# Patient Record
Sex: FEMALE | Race: BLACK OR AFRICAN AMERICAN | URBAN - METROPOLITAN AREA
[De-identification: names, ages, dates, MRNs, and addresses within clinical notes are randomized per-mention and may not be internally consistent; named-entity substitution may affect disease eponyms.]

---

## 2019-02-28 ENCOUNTER — OUTPATIENT (OUTPATIENT)
Dept: OUTPATIENT SERVICES | Facility: HOSPITAL | Age: 79
LOS: 1 days | Discharge: HOME | End: 2019-02-28

## 2019-02-28 DIAGNOSIS — R00.2 PALPITATIONS: ICD-10-CM

## 2019-02-28 DIAGNOSIS — E78.5 HYPERLIPIDEMIA, UNSPECIFIED: ICD-10-CM

## 2019-10-02 ENCOUNTER — OUTPATIENT (OUTPATIENT)
Dept: OUTPATIENT SERVICES | Facility: HOSPITAL | Age: 79
LOS: 1 days | Discharge: HOME | End: 2019-10-02

## 2019-10-02 DIAGNOSIS — E66.3 OVERWEIGHT: ICD-10-CM

## 2019-10-02 DIAGNOSIS — R94.31 ABNORMAL ELECTROCARDIOGRAM [ECG] [EKG]: ICD-10-CM

## 2019-10-02 DIAGNOSIS — E78.5 HYPERLIPIDEMIA, UNSPECIFIED: ICD-10-CM

## 2019-10-02 DIAGNOSIS — I05.9 RHEUMATIC MITRAL VALVE DISEASE, UNSPECIFIED: ICD-10-CM

## 2019-10-02 DIAGNOSIS — R00.2 PALPITATIONS: ICD-10-CM

## 2019-10-07 ENCOUNTER — OUTPATIENT (OUTPATIENT)
Dept: OUTPATIENT SERVICES | Facility: HOSPITAL | Age: 79
LOS: 1 days | Discharge: HOME | End: 2019-10-07

## 2021-05-12 ENCOUNTER — INPATIENT (INPATIENT)
Facility: HOSPITAL | Age: 81
LOS: 5 days | Discharge: NURSING, REHAB OR LONG-TERM CARE | End: 2021-05-18
Attending: INTERNAL MEDICINE | Admitting: INTERNAL MEDICINE
Payer: MEDICARE

## 2021-05-12 VITALS
SYSTOLIC BLOOD PRESSURE: 166 MMHG | RESPIRATION RATE: 18 BRPM | TEMPERATURE: 98 F | DIASTOLIC BLOOD PRESSURE: 83 MMHG | OXYGEN SATURATION: 98 % | HEART RATE: 69 BPM

## 2021-05-12 DIAGNOSIS — M25.552 PAIN IN LEFT HIP: ICD-10-CM

## 2021-05-12 DIAGNOSIS — H54.8 LEGAL BLINDNESS, AS DEFINED IN USA: ICD-10-CM

## 2021-05-12 DIAGNOSIS — S72.142A DISPLACED INTERTROCHANTERIC FRACTURE OF LEFT FEMUR, INITIAL ENCOUNTER FOR CLOSED FRACTURE: ICD-10-CM

## 2021-05-12 DIAGNOSIS — Z87.891 PERSONAL HISTORY OF NICOTINE DEPENDENCE: ICD-10-CM

## 2021-05-12 DIAGNOSIS — H40.9 UNSPECIFIED GLAUCOMA: ICD-10-CM

## 2021-05-12 DIAGNOSIS — R76.0 RAISED ANTIBODY TITER: ICD-10-CM

## 2021-05-12 DIAGNOSIS — Z91.040 LATEX ALLERGY STATUS: ICD-10-CM

## 2021-05-12 DIAGNOSIS — I35.0 NONRHEUMATIC AORTIC (VALVE) STENOSIS: ICD-10-CM
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
## 2021-05-12 DIAGNOSIS — R03.0 ELEVATED BLOOD-PRESSURE READING, WITHOUT DIAGNOSIS OF HYPERTENSION: ICD-10-CM
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
## 2021-05-12 LAB
ALBUMIN SERPL ELPH-MCNC: 4 G/DL — SIGNIFICANT CHANGE UP (ref 3.5–5.2)
ALP SERPL-CCNC: 89 U/L — SIGNIFICANT CHANGE UP (ref 30–115)
ALT FLD-CCNC: 18 U/L — SIGNIFICANT CHANGE UP (ref 0–41)
ANION GAP SERPL CALC-SCNC: 11 MMOL/L — SIGNIFICANT CHANGE UP (ref 7–14)
APTT BLD: 34.7 SEC — SIGNIFICANT CHANGE UP (ref 27–39.2)
AST SERPL-CCNC: 72 U/L — HIGH (ref 0–41)
BASE EXCESS BLDV CALC-SCNC: 3.2 MMOL/L — HIGH (ref -2–2)
BASOPHILS # BLD AUTO: 0.03 K/UL — SIGNIFICANT CHANGE UP (ref 0–0.2)
BASOPHILS NFR BLD AUTO: 0.4 % — SIGNIFICANT CHANGE UP (ref 0–1)
BILIRUB SERPL-MCNC: 0.7 MG/DL — SIGNIFICANT CHANGE UP (ref 0.2–1.2)
BLD GP AB SCN SERPL QL: SIGNIFICANT CHANGE UP
BUN SERPL-MCNC: 17 MG/DL — SIGNIFICANT CHANGE UP (ref 10–20)
CA-I SERPL-SCNC: 1.21 MMOL/L — SIGNIFICANT CHANGE UP (ref 1.12–1.3)
CALCIUM SERPL-MCNC: 9.1 MG/DL — SIGNIFICANT CHANGE UP (ref 8.5–10.1)
CHLORIDE SERPL-SCNC: 106 MMOL/L — SIGNIFICANT CHANGE UP (ref 98–110)
CO2 SERPL-SCNC: 22 MMOL/L — SIGNIFICANT CHANGE UP (ref 17–32)
CREAT SERPL-MCNC: 0.6 MG/DL — LOW (ref 0.7–1.5)
EOSINOPHIL # BLD AUTO: 0.09 K/UL — SIGNIFICANT CHANGE UP (ref 0–0.7)
EOSINOPHIL NFR BLD AUTO: 1.1 % — SIGNIFICANT CHANGE UP (ref 0–8)
GAS PNL BLDV: 141 MMOL/L — SIGNIFICANT CHANGE UP (ref 136–145)
GAS PNL BLDV: SIGNIFICANT CHANGE UP
GLUCOSE SERPL-MCNC: 110 MG/DL — HIGH (ref 70–99)
HCO3 BLDV-SCNC: 30 MMOL/L — HIGH (ref 22–29)
HCT VFR BLD CALC: 41 % — SIGNIFICANT CHANGE UP (ref 37–47)
HCT VFR BLDA CALC: 40.5 % — SIGNIFICANT CHANGE UP (ref 34–44)
HGB BLD CALC-MCNC: 13.2 G/DL — LOW (ref 14–18)
HGB BLD-MCNC: 13.5 G/DL — SIGNIFICANT CHANGE UP (ref 12–16)
IMM GRANULOCYTES NFR BLD AUTO: 0.4 % — HIGH (ref 0.1–0.3)
INR BLD: 1.01 RATIO — SIGNIFICANT CHANGE UP (ref 0.65–1.3)
LACTATE BLDV-MCNC: 1.2 MMOL/L — SIGNIFICANT CHANGE UP (ref 0.5–1.6)
LACTATE SERPL-SCNC: 1.4 MMOL/L — SIGNIFICANT CHANGE UP (ref 0.7–2)
LIDOCAIN IGE QN: 18 U/L — SIGNIFICANT CHANGE UP (ref 7–60)
LYMPHOCYTES # BLD AUTO: 0.77 K/UL — LOW (ref 1.2–3.4)
LYMPHOCYTES # BLD AUTO: 9.1 % — LOW (ref 20.5–51.1)
MCHC RBC-ENTMCNC: 31.2 PG — HIGH (ref 27–31)
MCHC RBC-ENTMCNC: 32.9 G/DL — SIGNIFICANT CHANGE UP (ref 32–37)
MCV RBC AUTO: 94.7 FL — SIGNIFICANT CHANGE UP (ref 81–99)
MONOCYTES # BLD AUTO: 0.67 K/UL — HIGH (ref 0.1–0.6)
MONOCYTES NFR BLD AUTO: 7.9 % — SIGNIFICANT CHANGE UP (ref 1.7–9.3)
NEUTROPHILS # BLD AUTO: 6.9 K/UL — HIGH (ref 1.4–6.5)
NEUTROPHILS NFR BLD AUTO: 81.1 % — HIGH (ref 42.2–75.2)
NRBC # BLD: 0 /100 WBCS — SIGNIFICANT CHANGE UP (ref 0–0)
PCO2 BLDV: 55 MMHG — HIGH (ref 41–51)
PH BLDV: 7.35 — SIGNIFICANT CHANGE UP (ref 7.26–7.43)
PLATELET # BLD AUTO: 147 K/UL — SIGNIFICANT CHANGE UP (ref 130–400)
PO2 BLDV: 26 MMHG — SIGNIFICANT CHANGE UP (ref 20–40)
POTASSIUM BLDV-SCNC: 3.8 MMOL/L — SIGNIFICANT CHANGE UP (ref 3.3–5.6)
POTASSIUM SERPL-MCNC: 6.1 MMOL/L — CRITICAL HIGH (ref 3.5–5)
POTASSIUM SERPL-SCNC: 6.1 MMOL/L — CRITICAL HIGH (ref 3.5–5)
PROT SERPL-MCNC: 6.7 G/DL — SIGNIFICANT CHANGE UP (ref 6–8)
PROTHROM AB SERPL-ACNC: 11.6 SEC — SIGNIFICANT CHANGE UP (ref 9.95–12.87)
RBC # BLD: 4.33 M/UL — SIGNIFICANT CHANGE UP (ref 4.2–5.4)
RBC # FLD: 12.8 % — SIGNIFICANT CHANGE UP (ref 11.5–14.5)
SAO2 % BLDV: 45 % — SIGNIFICANT CHANGE UP
SARS-COV-2 RNA SPEC QL NAA+PROBE: SIGNIFICANT CHANGE UP
SODIUM SERPL-SCNC: 139 MMOL/L — SIGNIFICANT CHANGE UP (ref 135–146)
WBC # BLD: 8.49 K/UL — SIGNIFICANT CHANGE UP (ref 4.8–10.8)
WBC # FLD AUTO: 8.49 K/UL — SIGNIFICANT CHANGE UP (ref 4.8–10.8)

## 2021-05-12 PROCEDURE — 73562 X-RAY EXAM OF KNEE 3: CPT | Mod: 26,LT

## 2021-05-12 PROCEDURE — 93010 ELECTROCARDIOGRAM REPORT: CPT | Mod: 76

## 2021-05-12 PROCEDURE — 99285 EMERGENCY DEPT VISIT HI MDM: CPT | Mod: CS

## 2021-05-12 PROCEDURE — 73502 X-RAY EXAM HIP UNI 2-3 VIEWS: CPT | Mod: 26,LT

## 2021-05-12 PROCEDURE — 99233 SBSQ HOSP IP/OBS HIGH 50: CPT

## 2021-05-12 PROCEDURE — 73551 X-RAY EXAM OF FEMUR 1: CPT | Mod: 26,LT

## 2021-05-12 PROCEDURE — 71045 X-RAY EXAM CHEST 1 VIEW: CPT | Mod: 26

## 2021-05-12 RX ORDER — SODIUM CHLORIDE 9 MG/ML
1000 INJECTION INTRAMUSCULAR; INTRAVENOUS; SUBCUTANEOUS
Refills: 0 | Status: DISCONTINUED | OUTPATIENT
Start: 2021-05-13 | End: 2021-05-14

## 2021-05-12 RX ORDER — MORPHINE SULFATE 50 MG/1
2 CAPSULE, EXTENDED RELEASE ORAL EVERY 4 HOURS
Refills: 0 | Status: DISCONTINUED | OUTPATIENT
Start: 2021-05-12 | End: 2021-05-14

## 2021-05-12 RX ORDER — ACETAMINOPHEN 500 MG
650 TABLET ORAL EVERY 4 HOURS
Refills: 0 | Status: DISCONTINUED | OUTPATIENT
Start: 2021-05-12 | End: 2021-05-14

## 2021-05-12 RX ORDER — TRAMADOL HYDROCHLORIDE 50 MG/1
50 TABLET ORAL EVERY 6 HOURS
Refills: 0 | Status: DISCONTINUED | OUTPATIENT
Start: 2021-05-12 | End: 2021-05-14

## 2021-05-12 RX ORDER — MORPHINE SULFATE 50 MG/1
4 CAPSULE, EXTENDED RELEASE ORAL ONCE
Refills: 0 | Status: DISCONTINUED | OUTPATIENT
Start: 2021-05-12 | End: 2021-05-12

## 2021-05-12 RX ORDER — ENOXAPARIN SODIUM 100 MG/ML
40 INJECTION SUBCUTANEOUS AT BEDTIME
Refills: 0 | Status: DISCONTINUED | OUTPATIENT
Start: 2021-05-12 | End: 2021-05-14

## 2021-05-12 RX ADMIN — MORPHINE SULFATE 4 MILLIGRAM(S): 50 CAPSULE, EXTENDED RELEASE ORAL at 08:43

## 2021-05-12 RX ADMIN — MORPHINE SULFATE 2 MILLIGRAM(S): 50 CAPSULE, EXTENDED RELEASE ORAL at 16:07

## 2021-05-12 RX ADMIN — ENOXAPARIN SODIUM 40 MILLIGRAM(S): 100 INJECTION SUBCUTANEOUS at 21:20

## 2021-05-12 RX ADMIN — MORPHINE SULFATE 4 MILLIGRAM(S): 50 CAPSULE, EXTENDED RELEASE ORAL at 09:44

## 2021-05-12 RX ADMIN — MORPHINE SULFATE 2 MILLIGRAM(S): 50 CAPSULE, EXTENDED RELEASE ORAL at 15:52

## 2021-05-12 NOTE — ED PROVIDER NOTE - OBJECTIVE STATEMENT
81 y/o female with a PMH of glaucoma b/l, and legally blind presents to the ED for evaluation of mechanical fall out of her bed around 6:30AM this morning. pt reports she was trying to reach for something on her dresser and rolled out of bed onto her left side. pt reports she was unable to get up on her own due to left hip pain. pt denies loc, use of blood thinners, headaches, dizziness, neck pain, back pain, abdominal pain, n/v/d/c, weakness, numbness, tingling, urinary symptoms.

## 2021-05-12 NOTE — H&P ADULT - ATTENDING COMMENTS
81 yo F with PMH of glaucoma b/l (legally blind) presents to the ED for after a mechanical fall out of her bed around 6:30AM this morning. Pt reports she was trying to reach for something on her dresser and rolled out of bed onto her left side. Subsequently had L hip pain and was unable to stand up on her own. Prior to this patient was able to ambulate comfortably w/o assistance. In ED, VS are afebrile, normocardic, BP elevated at 166/83, saturating well on RA. Labs unremarkable, covid negative. Imaging notable for L intertrochanteric fracture. Seen by Ortho, plan for ORIF tomorrow.     Physical Examination:       Assessment and Plan:    #Lt intertrochanteric fx   #Mechanical Fall   - Ortho following   - NWB LLE  - added to OR schedule for Left Hip ORIF for tomorrow  - NPO at midnight and IV fluids  - 2u pRBC on hold for OR, pre-op labs ordered   - pain control     #Perioperative Risk Stratification   - Risk factors include age  - pt legally blind, difficult to assess METS, however was able to ambulate comfortably prior to fall   - RCRI 0 , 3.9% risk   - EKG w/ NSR, no prior echo   - pt low risk for moderate risk procedure   - no further cardiac intervention required    # elevated BP - likely 2/2 pain, will monitor       Diet: Regular, NPO at midnight   DVT ppx: Lovenox 40 mg    #Progress Note Handoff:  Pending (specify):  ORIF tomorrow, then PT, maybe rehab   Family discussion: d/w patient at bedside   Disposition: Home___/SNF___/Other________/Unknown at this time__x______      Cassi Pinto, DO 81 yo F with PMH of glaucoma b/l , and L central retinal artery/vein occlusion ?(legally blind) presents to the ED for after a mechanical fall out of her bed around 6:30AM this morning. Pt reports she was trying to reach for something on her dresser and rolled out of bed onto her left side. Subsequently had L hip pain and was unable to stand up on her own. Prior to this patient was able to ambulate comfortably w/o assistance. In ED, VS are afebrile, normocardic, BP elevated at 166/83, saturating well on RA. Labs unremarkable, covid negative. Imaging notable for L intertrochanteric fracture. Seen by Ortho, plan for ORIF tomorrow.     Physical Examination:   GENERAL: NAD, AAO x 4, 80y F  HEAD:  Atraumatic, Normocephalic  EYES: EOMI, conjunctiva clear , blind bilateral eyes   NECK: Supple, No JVD  CHEST/LUNG: Clear to auscultation bilaterally; No wheeze; No crackles; No accessory muscles used  HEART: Regular rate and rhythm; grade 3 BRONSON throughout precordium   ABDOMEN: Soft, Nontender, Nondistended; Bowel sounds present; No guarding  EXTREMITIES:  2+ Peripheral Pulses, No cyanosis or edema, L hip ttp   NEUROLOGY: non-focal      Assessment and Plan:    #Lt intertrochanteric fx   #Mechanical Fall   - Ortho following   - NWB LLE  - added to OR schedule for Left Hip ORIF for tomorrow  - NPO at midnight and IV fluids  - 2u pRBC on hold for OR, pre-op labs ordered   - pain control     #Perioperative Risk Stratification   - Risk factors include age hx of stroke ( pt states she lost vision after retinal artery occlusion, Cardiologist documented retinal vein occlusion in chart )   - pt legally blind, however does about 40m on the rowing machine daily, and prior to this did 1hr on the exercise bike daily   - denies any chest pain/ dyspnea at rest or with exertion   - Grade 3 BRONSON noted, spoke w/ Cardiologist Dr. Luke - most recent Echocardiogram 11/2020 w moderate Aortic Stenosis ( however mean gradient on lower end 27 ) no documented chest pain, syncope etc in office   - EKG w/ NSR  - RCRI 1 , 6% risk of MACE   - pt low- moderate risk for moderate risk procedure   - recommend repeat Echocardiogram to ensure no significant progression of AS prior to Surgery    # elevated BP - likely 2/2 pain, will monitor       Diet: Regular, NPO at midnight   DVT ppx: Lovenox 40 mg    #Progress Note Handoff:  Pending (specify): Echocardiogram, ORIF tomorrow, then PT, maybe rehab   Family discussion: d/w patient and daughter at bedside   Disposition: Home___/SNF___/Other________/Unknown at this time__x______      Cassi Pinto, DO

## 2021-05-12 NOTE — H&P ADULT - NSHPPHYSICALEXAM_GEN_ALL_CORE
PHYSICAL EXAM:  GENERAL: NAD, AAO x 4, 80y F  HEAD:  Atraumatic, Normocephalic  EYES: EOMI, conjunctiva clear and sclera white  NECK: Supple, No JVD  CHEST/LUNG: Clear to auscultation bilaterally; No wheeze; No crackles; No accessory muscles used  HEART: Regular rate and rhythm; No murmurs;   ABDOMEN: Soft, Nontender, Nondistended; Bowel sounds present; No guarding  EXTREMITIES:  2+ Peripheral Pulses, No cyanosis or edema  NEUROLOGY: non-focal PHYSICAL EXAM:  GENERAL: NAD, AAO x 4, 80y F  HEAD:  Atraumatic, Normocephalic  EYES: EOMI, conjunctiva clear   NECK: Supple, No JVD  CHEST/LUNG: Clear to auscultation bilaterally; No wheeze; No crackles; No accessory muscles used  HEART: Regular rate and rhythm; No murmurs;   ABDOMEN: Soft, Nontender, Nondistended; Bowel sounds present; No guarding  EXTREMITIES:  2+ Peripheral Pulses, No cyanosis or edema  NEUROLOGY: non-focal

## 2021-05-12 NOTE — H&P ADULT - NSHPLABSRESULTS_GEN_ALL_CORE
LABS:                          13.5   8.49  )-----------( 147      ( 12 May 2021 08:40 )             41.0     05-12    139  |  106  |  17  ----------------------------<  110<H>  6.1<HH>   |  22  |  0.6<L>    Ca    9.1      12 May 2021 08:40    TPro  6.7  /  Alb  4.0  /  TBili  0.7  /  DBili  x   /  AST  72<H>  /  ALT  18  /  AlkPhos  89  05-12            PT/INR - ( 12 May 2021 08:40 )   PT: 11.60 sec;   INR: 1.01 ratio         PTT - ( 12 May 2021 08:40 )  PTT:34.7 sec  Lactate Trend  05-12 @ 08:40 Lactate:1.4     CARDIAC MARKERS ( 12 May 2021 08:40 )  x     / x     / 133 U/L / x     / x          CAPILLARY BLOOD GLUCOSE      POCT Blood Glucose.: 106 mg/dL (12 May 2021 08:33)        RADIOLOGY:    < from: Xray Hip 2-3 Views, Left (05.12.21 @ 09:45) >  Impression:  Left intertrochanteric fracture.  < end of copied text >          EKGS:    < from: 12 Lead ECG (05.12.21 @ 08:35) >    Diagnosis Line Normal sinus rhythm  Normal ECG    < end of copied text >

## 2021-05-12 NOTE — ED ADULT NURSE NOTE - NSFALLRSKOUTCOME_ED_ALL_ED
Was the patient seen in the last year in this department? Yes     Does patient have an active prescription for medications requested? No     Received Request Via: Pharmacy   Fall Risk

## 2021-05-12 NOTE — ED PROVIDER NOTE - PHYSICAL EXAMINATION
Physical Exam    Vital Signs: I have reviewed the initial vital signs.  Constitutional: well-nourished, appears stated age, no acute distress  Eyes: Conjunctiva pink, Sclera clear  Cardiovascular: S1 and S2, regular rate, regular rhythm, well-perfused extremities, radial and pedal pulses equal and 2+ b/l.   Respiratory: unlabored respiratory effort, clear to auscultation bilaterally no wheezing, rales and rhonchi. pt is speaking full sentences. no accessory muscle use.   Gastrointestinal: soft, non-tender, nondistended abdomen, no pulsatile mass, normal bowl sounds, no rebound, no guarding, no organomegaly.   Musculoskeletal: supple neck, no lower extremity edema, no calf tenderness, no midline tenderness, no palpable spinal step offs. (+) tenderness to the left hip, and left lower extremity externally rotated and shortened. pt has pain with flexion of the left knee, and active rom of left lower extremity.   Integumentary: warm, dry, no rash. no ecchymosis, abrasions, or lacerations. scar on the lower abdomen due to previous myomectomy in the past.   Neurologic: awake, alert, cranial nerves II-XII grossly intact, extremities’ motor and sensory functions grossly intact. finger to nose intact. negative pronator drift.   Psychiatric: appropriate mood, appropriate affect

## 2021-05-12 NOTE — ED PROVIDER NOTE - ATTENDING CONTRIBUTION TO CARE
80 yr old f w/ a pmh significant for glaucoma/legally blind, who presents s/p fall. Pt states that she was attempting to get out of the bed when she fell on her L side. Pt denies hitting her head and only hit the L hip. Pt denies any LOC, headaches, nausea, vomiting, sob, chest pain, or any other medical complaints.     VITAL SIGNS: I have reviewed nursing notes and confirm.  CONSTITUTIONAL: non-toxic, well appearing  SKIN: no rash, no petechiae.  EYES: EOMI, pink conjunctiva, anicteric  ENT: tongue midline, no exudates, MMM  NECK: Supple; no meningismus, no JVD  CARD: RRR, no murmurs, equal radial pulses bilaterally 2+  RESP: CTAB, no respiratory distress  ABD: Soft, non-tender, non-distended, no peritoneal signs, no HSM,  EXT: Normal ROM x4. No edema. No calves tenderness. ttp of the L hip  NEURO: Alert, oriented. CN2-12 intact, equal strength bilaterally.  PSYCH: Cooperative, appropriate.    a/p  80 yr old f that presents with L hip pain s/p fall   -labs  -imaging  -ekg  -ortho aware  -admit

## 2021-05-12 NOTE — CONSULT NOTE ADULT - ATTENDING COMMENTS
Pt seen and examined.  Agree with resident exam and plan.  NVI  xrays demonstrate L IT fracture  plan for ORIF L hip  NPO

## 2021-05-12 NOTE — H&P ADULT - NSHPOUTPATIENTPROVIDERS_GEN_ALL_CORE
Cardiologist - Dr. Murphy (pt has no known cardiovascular problems) Cardiologist - Dr. Arredondo (pt has no known cardiovascular problems)

## 2021-05-12 NOTE — ED PROVIDER NOTE - NS ED ROS FT
CONST: No fever, chills or bodyaches  EYES: No pain, redness, drainage or visual changes.  ENT: No ear pain or discharge, nasal discharge or congestion. No sore throat  CARD: No chest pain, palpitations  RESP: No SOB, cough, hemoptysis. No hx of asthma or COPD  GI: No abdominal pain, N/V/D  : No urinary symptoms  MS: No joint pain, back pain. (+) left hip pain  SKIN: No rashes  NEURO: No headache, dizziness, paresthesias or LOC

## 2021-05-12 NOTE — H&P ADULT - ASSESSMENT
81 yo F with PMH of glaucoma b/l (legally blind) presents to the ED for after a mechanical fall out of her bed around 6:30AM this morning. Found to have a left intertrochanteric fx.     # Lt intertrochanteric fx s/p mechanical fall  - per ortho recommendation:  - nonweightbearing to LLE  - added to OR schedule for Left Hip ORIF for tomorrow  - NPO at midnight and IV fluids  - order all pre-op labs/studies (CBC, CMP, Type and Screen x 2, PT/PTT/INR, EKG, CXR)  - 2u pRBC on hold for OR  - DVT ppx: give stat does of chem DVT ppx tonight, hold in AM before surgery), SCDs  - pain control PRN  - clearance/risk stratification by medicine attending    # Pseudohyperkalemia   - repeat BMP     # elevated BP  - likely secondary to pain  - if BP persistently elevated after procedure, will start amlodipine 5 mg qD     Diet: Regular  GI ppx: N/A  DVT ppx: Lovenox 40 mg qHS (hold in AM)  Activity: NWB of LLE  Code status: Full Code ( X ) / DNR (  ) / DNI (  )  Disposition: from home, requires ortho sx

## 2021-05-12 NOTE — ED ADULT TRIAGE NOTE - CHIEF COMPLAINT QUOTE
Patient arrives to ED after falling out of bed this morning, c/o left hip pain and is unable to adulate. Left hip shortened and externally rotated.  Denies LOC Head trauma and blood thinners

## 2021-05-12 NOTE — H&P ADULT - HISTORY OF PRESENT ILLNESS
79 yo F with PMH of glaucoma b/l (legally blind) presents to the ED for after a mechanical fall out of her bed around 6:30AM this morning. Pt reports she was trying to reach for something on her dresser and rolled out of bed onto her left side. P reports she was unable to get up on her own due to left hip pain. Prior to this event, pt is fairly healthy, can walk without assistant Pt denies trauma to head, LOC, use of blood thinners, headaches, dizziness, neck pain, back pain, abdominal pain, n/v/d/c, weakness, numbness, tingling, urinary symptoms.    ICU Vital Signs Last 24 Hrs  T(C): 36.8 (12 May 2021 08:28), Max: 36.8 (12 May 2021 08:28)  T(F): 98.2 (12 May 2021 08:28), Max: 98.2 (12 May 2021 08:28)  HR: 69 (12 May 2021 08:28) (69 - 69)  BP: 166/83 (12 May 2021 08:28) (166/83 - 166/83)  BP(mean): --  ABP: --  ABP(mean): --  RR: 18 (12 May 2021 08:28) (18 - 18)  SpO2: 98% (12 May 2021 08:28) (98% - 98%)    In ED, pt had Xray of hips showing Lt intertrochanter fx.  Seen by ortho and planned for sx tomorrow.

## 2021-05-12 NOTE — ED PROVIDER NOTE - PROGRESS NOTE DETAILS
FF: spoke with ortho will consult FF: spoke with trauma surgery; however ed no longer believes pt is candidate for trauma surgery evaluation due to isolated hip fx will admit to medicine.

## 2021-05-12 NOTE — H&P ADULT - NSHPREVIEWOFSYSTEMS_GEN_ALL_CORE

## 2021-05-12 NOTE — ED PROVIDER NOTE - CLINICAL SUMMARY MEDICAL DECISION MAKING FREE TEXT BOX
80 yr old f that presents s/p fall. labs, ekg, imaging obtained. pt noted to have hip fracture. evaluated by orthopedics. pt admitted to medicine for further evaluation.

## 2021-05-12 NOTE — ED ADULT NURSE NOTE - OBJECTIVE STATEMENT
patient c/o of fall s/p attempting to get out of bed landing on left side. no head injury no LOC no AC . + shortening to left leg with external rotation- + pedal pulses and cap refill. patient has no other complaints at this time

## 2021-05-12 NOTE — PROGRESS NOTE ADULT - SUBJECTIVE AND OBJECTIVE BOX
ORTHOPEDIC SURGERY PRE OP NOTE      Diagnosis: Left intertrochanteric femur fracture    Planned Procedure: Left hip open reduction internal fixation    Consent: TO BE OBTAINED BY ATTENDING                   Risks/benefits/alternatives were discussed with the patient/family and they wish to proceed with surgery.       ANTICIPATED DATE OF PROCEDURE :  5/13/21  SCHEDULED CASE OR ADD-ON CASE: Scheduled      Consent: To be obtained by attending    Clearances:   [  ] Medicine: Pending  [  ] Other: Pending    T(C): 36.8 (05-12-21 @ 08:28), Max: 36.8 (05-12-21 @ 08:28)  HR: 69 (05-12-21 @ 08:28) (69 - 69)  BP: 166/83 (05-12-21 @ 08:28) (166/83 - 166/83)  RR: 18 (05-12-21 @ 08:28) (18 - 18)  SpO2: 98% (05-12-21 @ 08:28) (98% - 98%)    Labs:                        13.5   8.49  )-----------( 147      ( 12 May 2021 08:40 )             41.0     05-12    139  |  106  |  17  ----------------------------<  110<H>  6.1<HH>   |  22  |  0.6<L>    Ca    9.1      12 May 2021 08:40    TPro  6.7  /  Alb  4.0  /  TBili  0.7  /  DBili  x   /  AST  72<H>  /  ALT  18  /  AlkPhos  89  05-12    PT/INR - ( 12 May 2021 08:40 )   PT: 11.60 sec;   INR: 1.01 ratio         PTT - ( 12 May 2021 08:40 )  PTT:34.7 sec  Type and Screen X 2:    COVID-19 PCR: NotDetec (12 May 2021 08:55)      [X]EKG:   [X]CXR:       DIET: NPO after midnight  IVF: per primary team      ANTICOAGULATION STATUS ( include name of anticoagulant) :  [ ] Continue PM dosing of anticoagulation                                     A/P: Patient is a 80y y/o Female Pending left femur open reduction internal fixation tomorrow    [ ]NPO and IVF @ midnight  [ ]pain control/analgesia prn per primary team   [ ]Incentive Spirometry   [ ]F/U Clearance  [ ]F/U Pending Labs  [ ]Notify Ortho with any questions- spectra [id number]    [ ]DISCUSSED WITH PRIMARY TEAM MEMBER (name of team member):   [ ]Date and Time DISCUSSED WITH PRIMARY TEAM MEMBER:  ORTHOPEDIC SURGERY PRE OP NOTE      Diagnosis: Left intertrochanteric femur fracture    Planned Procedure: Left hip open reduction internal fixation    Consent: TO BE OBTAINED BY ATTENDING                   Risks/benefits/alternatives were discussed with the patient/family and they wish to proceed with surgery.       ANTICIPATED DATE OF PROCEDURE :  5/13/21  SCHEDULED CASE OR ADD-ON CASE: Scheduled      Consent: To be obtained by attending    Clearances:   [  ] Medicine: Cleared if ECHO demonstrates no advancement of AS  [  ] Other: Pending    T(C): 36.8 (05-12-21 @ 08:28), Max: 36.8 (05-12-21 @ 08:28)  HR: 69 (05-12-21 @ 08:28) (69 - 69)  BP: 166/83 (05-12-21 @ 08:28) (166/83 - 166/83)  RR: 18 (05-12-21 @ 08:28) (18 - 18)  SpO2: 98% (05-12-21 @ 08:28) (98% - 98%)    Labs:                        13.5   8.49  )-----------( 147      ( 12 May 2021 08:40 )             41.0     05-12    139  |  106  |  17  ----------------------------<  110<H>  6.1<HH>   |  22  |  0.6<L>    Ca    9.1      12 May 2021 08:40    TPro  6.7  /  Alb  4.0  /  TBili  0.7  /  DBili  x   /  AST  72<H>  /  ALT  18  /  AlkPhos  89  05-12    PT/INR - ( 12 May 2021 08:40 )   PT: 11.60 sec;   INR: 1.01 ratio         PTT - ( 12 May 2021 08:40 )  PTT:34.7 sec  Type and Screen X 2:    COVID-19 PCR: NotDetec (12 May 2021 08:55)      [X] EKG:   [X] CXR:       DIET: NPO after midnight  IVF: per primary team      ANTICOAGULATION STATUS ( include name of anticoagulant) :  [ ] Continue PM dosing of anticoagulation                                     A/P: Patient is a 80y y/o Female Pending left femur open reduction internal fixation tomorrow    [ ]NPO and IVF @ midnight  [ ]pain control/analgesia prn per primary team   [ ]Incentive Spirometry   [ ]F/U Clearance  [ ]F/U Pending Labs  [ ]Notify Ortho with any questions- spectra [id number]    [ ]DISCUSSED WITH PRIMARY TEAM MEMBER (name of team member): Dr. Pinto  [ ]Date and Time DISCUSSED WITH PRIMARY TEAM MEMBER: 5/12/21, 3 pm ORTHOPEDIC SURGERY PRE OP NOTE      Diagnosis: Left intertrochanteric femur fracture    Planned Procedure: Left hip open reduction internal fixation    Consent: TO BE OBTAINED BY ATTENDING                   Risks/benefits/alternatives were discussed with the patient/family and they wish to proceed with surgery.       ANTICIPATED DATE OF PROCEDURE :  5/14  SCHEDULED CASE OR ADD-ON CASE: Scheduled for  @ 1500      Consent: To be obtained by attending    Clearances:   [X] Medicine: Cleared  [X] Other: Cardio: cleared     T(C): 36.8 (05-12-21 @ 08:28), Max: 36.8 (05-12-21 @ 08:28)  HR: 69 (05-12-21 @ 08:28) (69 - 69)  BP: 166/83 (05-12-21 @ 08:28) (166/83 - 166/83)  RR: 18 (05-12-21 @ 08:28) (18 - 18)  SpO2: 98% (05-12-21 @ 08:28) (98% - 98%)    Labs:                        13.5   8.49  )-----------( 147      ( 12 May 2021 08:40 )             41.0     05-12    139  |  106  |  17  ----------------------------<  110<H>  6.1<HH>   |  22  |  0.6<L>    Ca    9.1      12 May 2021 08:40    TPro  6.7  /  Alb  4.0  /  TBili  0.7  /  DBili  x   /  AST  72<H>  /  ALT  18  /  AlkPhos  89  05-12    PT/INR - ( 12 May 2021 08:40 )   PT: 11.60 sec;   INR: 1.01 ratio         PTT - ( 12 May 2021 08:40 )  PTT:34.7 sec  Type and Screen X 2:    COVID-19 PCR: NotDetec (12 May 2021 08:55)      [X] EKG  [X] CXR      DIET: NPO after midnight  IVF: per primary team      ANTICOAGULATION STATUS ( include name of anticoagulant) :  [ ] Continue PM dosing of anticoagulation    A/P: Patient is a 80y y/o Female Pending left hip open reduction internal fixation tomorrow    [ ]NPO and IVF @ midnight  [ ]pain control/analgesia prn per primary team   [ ]Incentive Spirometry   [ ]F/U Clearance  [ ]F/U Pending Labs  [ ]Notify Ortho with any questions- spectra [id number]    [ ]DISCUSSED WITH PRIMARY TEAM MEMBER (name of team member):   [ ]Date and Time DISCUSSED WITH PRIMARY TEAM MEMBER: 5/13/21 @ 6702 ORTHOPEDIC SURGERY PRE OP NOTE      Diagnosis: Left intertrochanteric femur fracture    Planned Procedure: Left hip open reduction internal fixation    Consent: TO BE OBTAINED BY ATTENDING                   Risks/benefits/alternatives were discussed with the patient/family and they wish to proceed with surgery.       ANTICIPATED DATE OF PROCEDURE :  5/14  SCHEDULED CASE OR ADD-ON CASE: Scheduled for  @ 1500      Consent: To be obtained by attending    Clearances:   [X] Medicine: Cleared  [X] Other: Cardio: cleared     T(C): 36.8 (05-12-21 @ 08:28), Max: 36.8 (05-12-21 @ 08:28)  HR: 69 (05-12-21 @ 08:28) (69 - 69)  BP: 166/83 (05-12-21 @ 08:28) (166/83 - 166/83)  RR: 18 (05-12-21 @ 08:28) (18 - 18)  SpO2: 98% (05-12-21 @ 08:28) (98% - 98%)    Labs:                        13.5   8.49  )-----------( 147      ( 12 May 2021 08:40 )             41.0     05-12    139  |  106  |  17  ----------------------------<  110<H>  6.1<HH>   |  22  |  0.6<L>    Ca    9.1      12 May 2021 08:40    TPro  6.7  /  Alb  4.0  /  TBili  0.7  /  DBili  x   /  AST  72<H>  /  ALT  18  /  AlkPhos  89  05-12    PT/INR - ( 12 May 2021 08:40 )   PT: 11.60 sec;   INR: 1.01 ratio         PTT - ( 12 May 2021 08:40 )  PTT:34.7 sec  Type and Screen X 2:    COVID-19 PCR: NotDetec (12 May 2021 08:55)      [X] EKG  [X] CXR      DIET: NPO after midnight  IVF: per primary team      ANTICOAGULATION STATUS ( include name of anticoagulant) :  [ ] Continue PM dosing of anticoagulation    A/P: Patient is a 80y y/o Female Pending left hip open reduction internal fixation tomorrow    [ ]NPO and IVF @ midnight  [ ]pain control/analgesia prn per primary team   [ ]Incentive Spirometry   [ ]F/U Clearance  [ ]F/U Pending Labs  [ ]Notify Ortho with any questions- spectra [id number]    [ ]DISCUSSED WITH PRIMARY TEAM MEMBER (name of team member):    [ ]Date and Time DISCUSSED WITH PRIMARY TEAM MEMBER: 5/13/21 @ 6070

## 2021-05-13 DIAGNOSIS — S72.002A FRACTURE OF UNSPECIFIED PART OF NECK OF LEFT FEMUR, INITIAL ENCOUNTER FOR CLOSED FRACTURE: ICD-10-CM

## 2021-05-13 LAB
ALBUMIN SERPL ELPH-MCNC: 3.8 G/DL — SIGNIFICANT CHANGE UP (ref 3.5–5.2)
ALP SERPL-CCNC: 84 U/L — SIGNIFICANT CHANGE UP (ref 30–115)
ALT FLD-CCNC: 11 U/L — SIGNIFICANT CHANGE UP (ref 0–41)
ANION GAP SERPL CALC-SCNC: 10 MMOL/L — SIGNIFICANT CHANGE UP (ref 7–14)
APTT BLD: 37.8 SEC — SIGNIFICANT CHANGE UP (ref 27–39.2)
AST SERPL-CCNC: 25 U/L — SIGNIFICANT CHANGE UP (ref 0–41)
BASOPHILS # BLD AUTO: 0.02 K/UL — SIGNIFICANT CHANGE UP (ref 0–0.2)
BASOPHILS NFR BLD AUTO: 0.2 % — SIGNIFICANT CHANGE UP (ref 0–1)
BILIRUB SERPL-MCNC: 1.1 MG/DL — SIGNIFICANT CHANGE UP (ref 0.2–1.2)
BLD GP AB SCN SERPL QL: SIGNIFICANT CHANGE UP
BUN SERPL-MCNC: 13 MG/DL — SIGNIFICANT CHANGE UP (ref 10–20)
CALCIUM SERPL-MCNC: 9 MG/DL — SIGNIFICANT CHANGE UP (ref 8.5–10.1)
CHLORIDE SERPL-SCNC: 108 MMOL/L — SIGNIFICANT CHANGE UP (ref 98–110)
CO2 SERPL-SCNC: 23 MMOL/L — SIGNIFICANT CHANGE UP (ref 17–32)
COVID-19 SPIKE DOMAIN AB INTERP: POSITIVE
COVID-19 SPIKE DOMAIN ANTIBODY RESULT: >250 U/ML — HIGH
CREAT SERPL-MCNC: 0.5 MG/DL — LOW (ref 0.7–1.5)
EOSINOPHIL # BLD AUTO: 0.02 K/UL — SIGNIFICANT CHANGE UP (ref 0–0.7)
EOSINOPHIL NFR BLD AUTO: 0.2 % — SIGNIFICANT CHANGE UP (ref 0–8)
GLUCOSE SERPL-MCNC: 95 MG/DL — SIGNIFICANT CHANGE UP (ref 70–99)
HCT VFR BLD CALC: 37.4 % — SIGNIFICANT CHANGE UP (ref 37–47)
HGB BLD-MCNC: 12.2 G/DL — SIGNIFICANT CHANGE UP (ref 12–16)
IMM GRANULOCYTES NFR BLD AUTO: 0.3 % — SIGNIFICANT CHANGE UP (ref 0.1–0.3)
INR BLD: 1.11 RATIO — SIGNIFICANT CHANGE UP (ref 0.65–1.3)
LACTATE SERPL-SCNC: 1.3 MMOL/L — SIGNIFICANT CHANGE UP (ref 0.7–2)
LYMPHOCYTES # BLD AUTO: 2.28 K/UL — SIGNIFICANT CHANGE UP (ref 1.2–3.4)
LYMPHOCYTES # BLD AUTO: 25.4 % — SIGNIFICANT CHANGE UP (ref 20.5–51.1)
MAGNESIUM SERPL-MCNC: 2.1 MG/DL — SIGNIFICANT CHANGE UP (ref 1.8–2.4)
MCHC RBC-ENTMCNC: 30.3 PG — SIGNIFICANT CHANGE UP (ref 27–31)
MCHC RBC-ENTMCNC: 32.6 G/DL — SIGNIFICANT CHANGE UP (ref 32–37)
MCV RBC AUTO: 92.8 FL — SIGNIFICANT CHANGE UP (ref 81–99)
MONOCYTES # BLD AUTO: 1.28 K/UL — HIGH (ref 0.1–0.6)
MONOCYTES NFR BLD AUTO: 14.3 % — HIGH (ref 1.7–9.3)
NEUTROPHILS # BLD AUTO: 5.35 K/UL — SIGNIFICANT CHANGE UP (ref 1.4–6.5)
NEUTROPHILS NFR BLD AUTO: 59.6 % — SIGNIFICANT CHANGE UP (ref 42.2–75.2)
NRBC # BLD: 0 /100 WBCS — SIGNIFICANT CHANGE UP (ref 0–0)
PHOSPHATE SERPL-MCNC: 2.9 MG/DL — SIGNIFICANT CHANGE UP (ref 2.1–4.9)
PLATELET # BLD AUTO: 122 K/UL — LOW (ref 130–400)
POTASSIUM SERPL-MCNC: 3.9 MMOL/L — SIGNIFICANT CHANGE UP (ref 3.5–5)
POTASSIUM SERPL-SCNC: 3.9 MMOL/L — SIGNIFICANT CHANGE UP (ref 3.5–5)
PROT SERPL-MCNC: 5.9 G/DL — LOW (ref 6–8)
PROTHROM AB SERPL-ACNC: 12.8 SEC — SIGNIFICANT CHANGE UP (ref 9.95–12.87)
RBC # BLD: 4.03 M/UL — LOW (ref 4.2–5.4)
RBC # FLD: 12.9 % — SIGNIFICANT CHANGE UP (ref 11.5–14.5)
SARS-COV-2 IGG+IGM SERPL QL IA: >250 U/ML — HIGH
SARS-COV-2 IGG+IGM SERPL QL IA: POSITIVE
SODIUM SERPL-SCNC: 141 MMOL/L — SIGNIFICANT CHANGE UP (ref 135–146)
WBC # BLD: 8.98 K/UL — SIGNIFICANT CHANGE UP (ref 4.8–10.8)
WBC # FLD AUTO: 8.98 K/UL — SIGNIFICANT CHANGE UP (ref 4.8–10.8)

## 2021-05-13 PROCEDURE — 93306 TTE W/DOPPLER COMPLETE: CPT | Mod: 26

## 2021-05-13 PROCEDURE — 99233 SBSQ HOSP IP/OBS HIGH 50: CPT

## 2021-05-13 PROCEDURE — 71045 X-RAY EXAM CHEST 1 VIEW: CPT | Mod: 26

## 2021-05-13 RX ADMIN — MORPHINE SULFATE 2 MILLIGRAM(S): 50 CAPSULE, EXTENDED RELEASE ORAL at 16:00

## 2021-05-13 RX ADMIN — MORPHINE SULFATE 2 MILLIGRAM(S): 50 CAPSULE, EXTENDED RELEASE ORAL at 19:58

## 2021-05-13 RX ADMIN — MORPHINE SULFATE 2 MILLIGRAM(S): 50 CAPSULE, EXTENDED RELEASE ORAL at 11:37

## 2021-05-13 RX ADMIN — MORPHINE SULFATE 2 MILLIGRAM(S): 50 CAPSULE, EXTENDED RELEASE ORAL at 11:04

## 2021-05-13 RX ADMIN — SODIUM CHLORIDE 50 MILLILITER(S): 9 INJECTION INTRAMUSCULAR; INTRAVENOUS; SUBCUTANEOUS at 00:15

## 2021-05-13 RX ADMIN — MORPHINE SULFATE 2 MILLIGRAM(S): 50 CAPSULE, EXTENDED RELEASE ORAL at 06:55

## 2021-05-13 RX ADMIN — ENOXAPARIN SODIUM 40 MILLIGRAM(S): 100 INJECTION SUBCUTANEOUS at 21:10

## 2021-05-13 NOTE — PROGRESS NOTE ADULT - SUBJECTIVE AND OBJECTIVE BOX
LENGTH OF HOSPITAL STAY: 1d      CHIEF COMPLAINT:   Patient is a 80y old  Female who presents with a chief complaint of s/p Mechanical fall (12 May 2021 12:42)      OVER Past 24hrs:  The patient was seen and examined at bedside there were no events.      HISTORY OF PRESENTING ILLNESS:    HPI:  81 yo F with PMH of glaucoma b/l (legally blind) presents to the ED for after a mechanical fall out of her bed around 6:30AM this morning. Pt reports she was trying to reach for something on her dresser and rolled out of bed onto her left side. P reports she was unable to get up on her own due to left hip pain. Prior to this event, pt is fairly healthy, can walk without assistant Pt denies trauma to head, LOC, use of blood thinners, headaches, dizziness, neck pain, back pain, abdominal pain, n/v/d/c, weakness, numbness, tingling, urinary symptoms.    ICU Vital Signs Last 24 Hrs  T(C): 36.8 (12 May 2021 08:28), Max: 36.8 (12 May 2021 08:28)  T(F): 98.2 (12 May 2021 08:28), Max: 98.2 (12 May 2021 08:28)  HR: 69 (12 May 2021 08:28) (69 - 69)  BP: 166/83 (12 May 2021 08:28) (166/83 - 166/83)  BP(mean): --  ABP: --  ABP(mean): --  RR: 18 (12 May 2021 08:28) (18 - 18)  SpO2: 98% (12 May 2021 08:28) (98% - 98%)    In ED, pt had Xray of hips showing Lt intertrochanter fx.  Seen by ortho and planned for sx tomorrow.  (12 May 2021 10:31)    PAST MEDICAL & SURGICAL HISTORY  PAST MEDICAL & SURGICAL HISTORY:        REVIEW OF SYSTEMS  CONSTITUTIONAL: No weakness, fevers or chills, no weight loss   EYES/ENT: No visual changes;  No vertigo or throat pain   NECK: No pain or stiffness  RESPIRATORY: No cough, wheezing, hemoptysis; No shortness of breath  CARDIOVASCULAR: No chest pain or palpitations  GASTROINTESTINAL: No abdominal or epigastric pain. No nausea, vomiting, or hematemesis; No diarrhea or constipation. No melena or hematochezia.  GENITOURINARY: No dysuria, frequency or hematuria  NEUROLOGICAL: No numbness or weakness  All other review of systems is negative unless indicated above.    ALLERGIES:  No Known Allergies    MEDICATIONS:  STANDING MEDICATIONS  enoxaparin Injectable 40 milliGRAM(s) SubCutaneous at bedtime  sodium chloride 0.9%. 1000 milliLiter(s) IV Continuous <Continuous>    PRN MEDICATIONS  acetaminophen   Tablet .. 650 milliGRAM(s) Oral every 4 hours PRN  morphine  - Injectable 2 milliGRAM(s) IV Push every 4 hours PRN  traMADol 50 milliGRAM(s) Oral every 6 hours PRN    VITALS:   T(F): 97.3  HR: 76  BP: 160/83  RR: 18  SpO2: --    PHYSICAL EXAM:  General: No acute distress.  Alert, oriented, interactive, nonfocal.    HEENT: Pupils equal, reactive to light symmetrically.    PULM: Clear to auscultation bilaterally, no significant sputum production.    CVS: Regular rate and rhythm, no murmurs, rubs, or gallops.    GI: Soft, nondistended, nontender, no masses.    MSK: LLE dec ROM    SKIN: Warm and well perfused, no rashes noted.      LABS:                        12.2   8.98  )-----------( 122      ( 13 May 2021 06:15 )             37.4     05-13    141  |  108  |  13  ----------------------------<  95  3.9   |  23  |  0.5<L>    Ca    9.0      13 May 2021 06:15  Phos  2.9     05-13  Mg     2.1     05-13    TPro  5.9<L>  /  Alb  3.8  /  TBili  1.1  /  DBili  x   /  AST  25  /  ALT  11  /  AlkPhos  84  05-13    PT/INR - ( 13 May 2021 06:15 )   PT: 12.80 sec;   INR: 1.11 ratio         PTT - ( 13 May 2021 06:15 )  PTT:37.8 sec      Lactate, Blood: 1.3 mmol/L (05-13-21 @ 06:15)      CARDIAC MARKERS ( 12 May 2021 08:40 )  x     / x     / 133 U/L / x     / x          RADIOLOGY:

## 2021-05-13 NOTE — PRE-ANESTHESIA EVALUATION ADULT - NSANTHPMHFT_GEN_ALL_CORE
80y old  F with a chief complaint of s/p Mechanical fall  PMHx: b/l glaucoma (legally blind)  denies any other medical problems, METS>4, no recent chest pain or palpitations, no SOB
S/P mechanical fall - left hip Fx  Severe Aortic stenosis  Glaucoma- Legally blind  Internal medicine - cleared for Sx; 2 units of PRBCs on hold for OR  Cardiology Consult - optimized from a cardiac standpoint; pt requires 24 hrs cardiac monitoring post-op

## 2021-05-13 NOTE — PROGRESS NOTE ADULT - ATTENDING COMMENTS
Pt c/o pain in left hip, no other complaints. Pt states prior to fall she was very functional, she exercises about 40 min a day, no chest pain, no SOB, no Hx of syncope.      ROS:  CONSTITUTIONAL: No weakness, fevers or chills  EYES/ENT: Legally blind, No vertigo or throat pain   NECK: No pain or stiffness  RESPIRATORY: No cough, wheezing, hemoptysis; No shortness of breath  CARDIOVASCULAR: No chest pain or palpitations  GASTROINTESTINAL: No abdominal or epigastric pain. No nausea, vomiting, or hematemesis; No diarrhea or constipation. No melena or hematochezia.  GENITOURINARY: No dysuria, frequency or hematuria  NEUROLOGICAL: No numbness or weakness  SKIN: No itching, rashes       Physical exam:  in mild distress due to pain  HEENT: PERRL, moist mucous membranes   NECK: supple, no JVD  RESP: CTA b/l, no crackles, rhonchi  CVS: S1S2, RRR, loud systolic murmur   GI: abdomen soft NT, ND  Extremities: Left leg externally rotated, no legs edema, no calf tenderness  NEURO: AOx3, no focal deficit  H/L: no enlarged LN noted     labs and images reviewed.  ECHO results noted: EF 70%. Peak transaortic gradient equals 73.1 mmHg, mean transaortic gradient equals 41.4 mmHg, the calculated aortic valve area equals 0.94 cm² by the continuity equation consistent with severe aortic stenosis.    A/P: # Mechanical fall with left hip fracture.  - pt is moderate to high risk for moderate risk procedure. Given pt is not symptomatic and hp fracture repair is time sensitive procedure will clear pt for surgery. Avoid fluid overload perioperatively.  - pain control with Morphine 2 mg Q 4 hrs PRN for now.    # BP on high side, most likely due to uncontrolled BP. Not on meds at home. Monitor for now.     DVT ppx Lovenox  Discussed with ortho team.

## 2021-05-13 NOTE — CONSULT NOTE ADULT - PROBLEM SELECTOR RECOMMENDATION 9
pt Is preop for hip fx surgery.  pt is able to do > 4 mets and has no active angina nor chf sxs.  pt's revised cardiac risk index is 1 point and class2 with a 6%30 day risk of death, mi or cardiac arrest.  pt with severe aortic stenosis which increases the pt's preop risk to severe.  pt is euvolemic and optimized from a cardiac standpoint.  pt requires 24 hour cardiac monitoring post op. pt to receive lasix for post op hypoxia with close post op fluid management.

## 2021-05-13 NOTE — ASU PREOP CHECKLIST - AS TEMP SITE
If you have any questions regarding your visit, Please contact your care team.    [employer]: [phone]      [facility] CLINIC HOURS TELEPHONE NUMBER   Elle Obregon DO.    CARL Perez -    CARLOS Lockhart       Monday, Wednesday, Thursday and Friday, [place]  8:30a.m-5:00 p.m   [facility]  [address] [place] [address]  [phone] ask for [facility]    Imaging [phone]       Urgent Care locations:    [facility] Saturday and Sunday   9 am - 5 pm    Monday-Friday   12 pm - 8 pm  Saturday and Sunday   9 am - 5 pm   [phone]     [facility] Labor and Delivery:  [phone]    If you need a medication refill, please contact your pharmacy. Please allow 3 business days for your refill to be completed.  As always, Thank you for trusting us with your healthcare needs!         oral

## 2021-05-14 DIAGNOSIS — S72.009A FRACTURE OF UNSPECIFIED PART OF NECK OF UNSPECIFIED FEMUR, INITIAL ENCOUNTER FOR CLOSED FRACTURE: ICD-10-CM

## 2021-05-14 LAB
ALBUMIN SERPL ELPH-MCNC: 3.5 G/DL — SIGNIFICANT CHANGE UP (ref 3.5–5.2)
ALP SERPL-CCNC: 72 U/L — SIGNIFICANT CHANGE UP (ref 30–115)
ALT FLD-CCNC: 9 U/L — SIGNIFICANT CHANGE UP (ref 0–41)
ANION GAP SERPL CALC-SCNC: 9 MMOL/L — SIGNIFICANT CHANGE UP (ref 7–14)
AST SERPL-CCNC: 24 U/L — SIGNIFICANT CHANGE UP (ref 0–41)
BASOPHILS # BLD AUTO: 0.04 K/UL — SIGNIFICANT CHANGE UP (ref 0–0.2)
BASOPHILS NFR BLD AUTO: 0.4 % — SIGNIFICANT CHANGE UP (ref 0–1)
BILIRUB SERPL-MCNC: 1 MG/DL — SIGNIFICANT CHANGE UP (ref 0.2–1.2)
BUN SERPL-MCNC: 10 MG/DL — SIGNIFICANT CHANGE UP (ref 10–20)
CALCIUM SERPL-MCNC: 8.7 MG/DL — SIGNIFICANT CHANGE UP (ref 8.5–10.1)
CHLORIDE SERPL-SCNC: 108 MMOL/L — SIGNIFICANT CHANGE UP (ref 98–110)
CO2 SERPL-SCNC: 25 MMOL/L — SIGNIFICANT CHANGE UP (ref 17–32)
CREAT SERPL-MCNC: 0.5 MG/DL — LOW (ref 0.7–1.5)
EOSINOPHIL # BLD AUTO: 0.07 K/UL — SIGNIFICANT CHANGE UP (ref 0–0.7)
EOSINOPHIL NFR BLD AUTO: 0.8 % — SIGNIFICANT CHANGE UP (ref 0–8)
GLUCOSE SERPL-MCNC: 94 MG/DL — SIGNIFICANT CHANGE UP (ref 70–99)
HCT VFR BLD CALC: 34.8 % — LOW (ref 37–47)
HGB BLD-MCNC: 11.4 G/DL — LOW (ref 12–16)
IMM GRANULOCYTES NFR BLD AUTO: 0.4 % — HIGH (ref 0.1–0.3)
LYMPHOCYTES # BLD AUTO: 2.13 K/UL — SIGNIFICANT CHANGE UP (ref 1.2–3.4)
LYMPHOCYTES # BLD AUTO: 23 % — SIGNIFICANT CHANGE UP (ref 20.5–51.1)
MAGNESIUM SERPL-MCNC: 1.9 MG/DL — SIGNIFICANT CHANGE UP (ref 1.8–2.4)
MCHC RBC-ENTMCNC: 30.9 PG — SIGNIFICANT CHANGE UP (ref 27–31)
MCHC RBC-ENTMCNC: 32.8 G/DL — SIGNIFICANT CHANGE UP (ref 32–37)
MCV RBC AUTO: 94.3 FL — SIGNIFICANT CHANGE UP (ref 81–99)
MONOCYTES # BLD AUTO: 1.31 K/UL — HIGH (ref 0.1–0.6)
MONOCYTES NFR BLD AUTO: 14.1 % — HIGH (ref 1.7–9.3)
NEUTROPHILS # BLD AUTO: 5.69 K/UL — SIGNIFICANT CHANGE UP (ref 1.4–6.5)
NEUTROPHILS NFR BLD AUTO: 61.3 % — SIGNIFICANT CHANGE UP (ref 42.2–75.2)
NRBC # BLD: 0 /100 WBCS — SIGNIFICANT CHANGE UP (ref 0–0)
PHOSPHATE SERPL-MCNC: 2.7 MG/DL — SIGNIFICANT CHANGE UP (ref 2.1–4.9)
PLATELET # BLD AUTO: 106 K/UL — LOW (ref 130–400)
POTASSIUM SERPL-MCNC: 3.6 MMOL/L — SIGNIFICANT CHANGE UP (ref 3.5–5)
POTASSIUM SERPL-SCNC: 3.6 MMOL/L — SIGNIFICANT CHANGE UP (ref 3.5–5)
PROT SERPL-MCNC: 5.5 G/DL — LOW (ref 6–8)
RBC # BLD: 3.69 M/UL — LOW (ref 4.2–5.4)
RBC # FLD: 12.8 % — SIGNIFICANT CHANGE UP (ref 11.5–14.5)
SODIUM SERPL-SCNC: 142 MMOL/L — SIGNIFICANT CHANGE UP (ref 135–146)
WBC # BLD: 9.28 K/UL — SIGNIFICANT CHANGE UP (ref 4.8–10.8)
WBC # FLD AUTO: 9.28 K/UL — SIGNIFICANT CHANGE UP (ref 4.8–10.8)

## 2021-05-14 PROCEDURE — 99232 SBSQ HOSP IP/OBS MODERATE 35: CPT

## 2021-05-14 RX ORDER — OXYCODONE HYDROCHLORIDE 5 MG/1
10 TABLET ORAL EVERY 6 HOURS
Refills: 0 | Status: DISCONTINUED | OUTPATIENT
Start: 2021-05-14 | End: 2021-05-18

## 2021-05-14 RX ORDER — CEFAZOLIN SODIUM 1 G
2000 VIAL (EA) INJECTION EVERY 8 HOURS
Refills: 0 | Status: COMPLETED | OUTPATIENT
Start: 2021-05-14 | End: 2021-05-15

## 2021-05-14 RX ORDER — SENNA PLUS 8.6 MG/1
2 TABLET ORAL AT BEDTIME
Refills: 0 | Status: DISCONTINUED | OUTPATIENT
Start: 2021-05-14 | End: 2021-05-18

## 2021-05-14 RX ORDER — SODIUM CHLORIDE 9 MG/ML
1000 INJECTION, SOLUTION INTRAVENOUS
Refills: 0 | Status: DISCONTINUED | OUTPATIENT
Start: 2021-05-14 | End: 2021-05-14

## 2021-05-14 RX ORDER — OXYCODONE HYDROCHLORIDE 5 MG/1
5 TABLET ORAL EVERY 4 HOURS
Refills: 0 | Status: DISCONTINUED | OUTPATIENT
Start: 2021-05-14 | End: 2021-05-18

## 2021-05-14 RX ORDER — ONDANSETRON 8 MG/1
4 TABLET, FILM COATED ORAL ONCE
Refills: 0 | Status: DISCONTINUED | OUTPATIENT
Start: 2021-05-14 | End: 2021-05-14

## 2021-05-14 RX ORDER — HYDROMORPHONE HYDROCHLORIDE 2 MG/ML
0.5 INJECTION INTRAMUSCULAR; INTRAVENOUS; SUBCUTANEOUS
Refills: 0 | Status: DISCONTINUED | OUTPATIENT
Start: 2021-05-14 | End: 2021-05-14

## 2021-05-14 RX ORDER — HYDROMORPHONE HYDROCHLORIDE 2 MG/ML
1 INJECTION INTRAMUSCULAR; INTRAVENOUS; SUBCUTANEOUS
Refills: 0 | Status: DISCONTINUED | OUTPATIENT
Start: 2021-05-14 | End: 2021-05-14

## 2021-05-14 RX ORDER — ENOXAPARIN SODIUM 100 MG/ML
40 INJECTION SUBCUTANEOUS AT BEDTIME
Refills: 0 | Status: DISCONTINUED | OUTPATIENT
Start: 2021-05-14 | End: 2021-05-18

## 2021-05-14 RX ORDER — POLYETHYLENE GLYCOL 3350 17 G/17G
17 POWDER, FOR SOLUTION ORAL AT BEDTIME
Refills: 0 | Status: DISCONTINUED | OUTPATIENT
Start: 2021-05-14 | End: 2021-05-18

## 2021-05-14 RX ADMIN — SODIUM CHLORIDE 50 MILLILITER(S): 9 INJECTION INTRAMUSCULAR; INTRAVENOUS; SUBCUTANEOUS at 05:54

## 2021-05-14 RX ADMIN — ENOXAPARIN SODIUM 40 MILLIGRAM(S): 100 INJECTION SUBCUTANEOUS at 21:47

## 2021-05-14 RX ADMIN — HYDROMORPHONE HYDROCHLORIDE 1 MILLIGRAM(S): 2 INJECTION INTRAMUSCULAR; INTRAVENOUS; SUBCUTANEOUS at 17:20

## 2021-05-14 RX ADMIN — MORPHINE SULFATE 2 MILLIGRAM(S): 50 CAPSULE, EXTENDED RELEASE ORAL at 00:38

## 2021-05-14 RX ADMIN — SENNA PLUS 2 TABLET(S): 8.6 TABLET ORAL at 21:46

## 2021-05-14 RX ADMIN — OXYCODONE HYDROCHLORIDE 5 MILLIGRAM(S): 5 TABLET ORAL at 21:46

## 2021-05-14 RX ADMIN — POLYETHYLENE GLYCOL 3350 17 GRAM(S): 17 POWDER, FOR SOLUTION ORAL at 21:47

## 2021-05-14 RX ADMIN — MORPHINE SULFATE 2 MILLIGRAM(S): 50 CAPSULE, EXTENDED RELEASE ORAL at 05:53

## 2021-05-14 RX ADMIN — HYDROMORPHONE HYDROCHLORIDE 1 MILLIGRAM(S): 2 INJECTION INTRAMUSCULAR; INTRAVENOUS; SUBCUTANEOUS at 17:05

## 2021-05-14 RX ADMIN — Medication 100 MILLIGRAM(S): at 21:47

## 2021-05-14 NOTE — BRIEF OPERATIVE NOTE - NSICDXBRIEFPROCEDURE_GEN_ALL_CORE_FT
DOS: 4/30/2021.  Preprocedural phone interview and instructions completed.  Patient verbalizes correct arrival time and location, NPO status and medications ([place] thyroid, atenolol) with only sips of water as per anesthesia protocol.    Please assist patient, on DOS, with listing a \"patient representative\" in her EHR.     Covid-19 swab on 4/28/2021: negative.  BMP & CBC on DOS.     Patient was informed of current policy of only one adult allowed DOS for outpatient procedures due to coronavirus precautions. Patients and caregivers / drivers will be screened upon entry to hospital.  Patient verbalized understanding of the policy and will wear a mask. Patient also advised that  parking is not available at this time.   PROCEDURES:  Insertion, Gamma nail, hip 14-May-2021 16:15:12  Meng Hirsch

## 2021-05-14 NOTE — PRE-ANESTHESIA EVALUATION ADULT - LAST ECHOCARDIOGRAM
NSR, HR 72
NSR, HR 72; ECHO - EF 70%; Moderate LVH; Severe Aortic stenosis
NSR, HR 72; ECHO - EF 70%; Moderate LVH; Severe Aortic stenosis

## 2021-05-14 NOTE — PRE-ANESTHESIA EVALUATION ADULT - NSANTHOSAYNRD_GEN_A_CORE
No. KEANU screening performed.  STOP BANG Legend: 0-2 = LOW Risk; 3-4 = INTERMEDIATE Risk; 5-8 = HIGH Risk

## 2021-05-14 NOTE — PROGRESS NOTE ADULT - SUBJECTIVE AND OBJECTIVE BOX
Orthopedic Post-Operative Check    Procedure: Left femur cephallomedullary nailing     Patient seen and examined postoperatively.  No acute issues.  Pain well controlled, tolerating PO intake.  Will be transferred to telemetry floor once cleared by anesthesia      T(C): 36.4 (05-14-21 @ 16:47), Max: 37.6 (05-14-21 @ 00:48)  HR: 83 (05-14-21 @ 18:00) (75 - 91)  BP: 148/72 (05-14-21 @ 18:00) (133/84 - 159/83)  RR: 22 (05-14-21 @ 18:00) (14 - 26)  SpO2: 98% (05-14-21 @ 18:00) (94% - 99%)    No apparent distress  Nonlabored breathing    LLE  Dressing C/D/I  Motor intact EHL/FHL/TA/Gastroc  SILT s/s/sp/dp/t distribution  foot/toes wwp      05-14-21 @ 06:34  H/H: 11.4/34.8  WBC: 9.28      05-14-21 @ 06:34  Na 142   K 3.6    Cl 108  HCO3 25    BUN 10  Cr 0.5  Gluc 94      A/P: 80yFemale s/p procedure as above. Transfer to telemetry floor for 24 hour montioring    - Weightbearing as tolerated LLE  - Abx for 24 hours  - DVT prophylaxis: SCDs, LVX  - AM labs  - Monitor dressing  - PT/OT/Rehab  - Dispo planning

## 2021-05-14 NOTE — PROGRESS NOTE ADULT - SUBJECTIVE AND OBJECTIVE BOX
LENGTH OF HOSPITAL STAY: 2d      CHIEF COMPLAINT:   Patient is a 80y old  Female who presents with a chief complaint of s/p Mechanical fall (13 May 2021 12:45)      OVER Past 24hrs:  The patient was seen and examined at bedside there were no overnight events. Left ORIF today.    HISTORY OF PRESENTING ILLNESS:    HPI:  79 yo F with PMH of glaucoma b/l (legally blind) presents to the ED for after a mechanical fall out of her bed around 6:30AM this morning. Pt reports she was trying to reach for something on her dresser and rolled out of bed onto her left side. P reports she was unable to get up on her own due to left hip pain. Prior to this event, pt is fairly healthy, can walk without assistant Pt denies trauma to head, LOC, use of blood thinners, headaches, dizziness, neck pain, back pain, abdominal pain, n/v/d/c, weakness, numbness, tingling, urinary symptoms.    ICU Vital Signs Last 24 Hrs  T(C): 36.8 (12 May 2021 08:28), Max: 36.8 (12 May 2021 08:28)  T(F): 98.2 (12 May 2021 08:28), Max: 98.2 (12 May 2021 08:28)  HR: 69 (12 May 2021 08:28) (69 - 69)  BP: 166/83 (12 May 2021 08:28) (166/83 - 166/83)  BP(mean): --  ABP: --  ABP(mean): --  RR: 18 (12 May 2021 08:28) (18 - 18)  SpO2: 98% (12 May 2021 08:28) (98% - 98%)    In ED, pt had Xray of hips showing Lt intertrochanter fx.  Seen by ortho and planned for sx tomorrow.  (12 May 2021 10:31)    PAST MEDICAL & SURGICAL HISTORY  PAST MEDICAL & SURGICAL HISTORY:        REVIEW OF SYSTEMS  CONSTITUTIONAL: No weakness, fevers or chills, no weight loss   EYES/ENT: No visual changes;  No vertigo or throat pain   NECK: No pain or stiffness  RESPIRATORY: No cough, wheezing, hemoptysis; No shortness of breath  CARDIOVASCULAR: No chest pain or palpitations  GASTROINTESTINAL: No abdominal or epigastric pain. No nausea, vomiting, or hematemesis; No diarrhea or constipation. No melena or hematochezia.  GENITOURINARY: No dysuria, frequency or hematuria  NEUROLOGICAL: No numbness or weakness  All other review of systems is negative unless indicated above.    ALLERGIES:  latex (Pruritus)  Nickel based metals (Other)  No Known Drug Allergies    MEDICATIONS:  STANDING MEDICATIONS  enoxaparin Injectable 40 milliGRAM(s) SubCutaneous at bedtime  sodium chloride 0.9%. 1000 milliLiter(s) IV Continuous <Continuous>    PRN MEDICATIONS  acetaminophen   Tablet .. 650 milliGRAM(s) Oral every 4 hours PRN  morphine  - Injectable 2 milliGRAM(s) IV Push every 4 hours PRN  traMADol 50 milliGRAM(s) Oral every 6 hours PRN    VITALS:   T(F): 99  HR: 91  BP: 159/83  RR: 14  SpO2: 95%    PHYSICAL EXAM:  General: No acute distress.  Alert, oriented, interactive, nonfocal.    HEENT: Pupils equal, reactive to light symmetrically.    PULM: Clear to auscultation bilaterally, no significant sputum production.    CVS: Regular rate and rhythm, no murmurs, rubs, or gallops.    GI: Soft, nondistended, nontender, no masses.    MSK: No edema, nontender. Dec ROM LLE    SKIN: Warm and well perfused, no rashes noted.      LABS:                        11.4   9.28  )-----------( 106      ( 14 May 2021 06:34 )             34.8     05-14    142  |  108  |  10  ----------------------------<  94  3.6   |  25  |  0.5<L>    Ca    8.7      14 May 2021 06:34  Phos  2.7     05-14  Mg     1.9     05-14    TPro  5.5<L>  /  Alb  3.5  /  TBili  1.0  /  DBili  x   /  AST  24  /  ALT  9   /  AlkPhos  72  05-14    PT/INR - ( 13 May 2021 06:15 )   PT: 12.80 sec;   INR: 1.11 ratio         PTT - ( 13 May 2021 06:15 )  PTT:37.8 sec              RADIOLOGY:

## 2021-05-15 DIAGNOSIS — I35.0 NONRHEUMATIC AORTIC (VALVE) STENOSIS: ICD-10-CM

## 2021-05-15 LAB
ALBUMIN SERPL ELPH-MCNC: 3.9 G/DL — SIGNIFICANT CHANGE UP (ref 3.5–5.2)
ALP SERPL-CCNC: 75 U/L — SIGNIFICANT CHANGE UP (ref 30–115)
ALT FLD-CCNC: 12 U/L — SIGNIFICANT CHANGE UP (ref 0–41)
ANION GAP SERPL CALC-SCNC: 15 MMOL/L — HIGH (ref 7–14)
AST SERPL-CCNC: 30 U/L — SIGNIFICANT CHANGE UP (ref 0–41)
BASOPHILS # BLD AUTO: 0.02 K/UL — SIGNIFICANT CHANGE UP (ref 0–0.2)
BASOPHILS NFR BLD AUTO: 0.2 % — SIGNIFICANT CHANGE UP (ref 0–1)
BILIRUB SERPL-MCNC: 0.8 MG/DL — SIGNIFICANT CHANGE UP (ref 0.2–1.2)
BUN SERPL-MCNC: 10 MG/DL — SIGNIFICANT CHANGE UP (ref 10–20)
CALCIUM SERPL-MCNC: 9.3 MG/DL — SIGNIFICANT CHANGE UP (ref 8.5–10.1)
CHLORIDE SERPL-SCNC: 105 MMOL/L — SIGNIFICANT CHANGE UP (ref 98–110)
CO2 SERPL-SCNC: 22 MMOL/L — SIGNIFICANT CHANGE UP (ref 17–32)
CREAT SERPL-MCNC: 0.6 MG/DL — LOW (ref 0.7–1.5)
EOSINOPHIL # BLD AUTO: 0 K/UL — SIGNIFICANT CHANGE UP (ref 0–0.7)
EOSINOPHIL NFR BLD AUTO: 0 % — SIGNIFICANT CHANGE UP (ref 0–8)
GLUCOSE SERPL-MCNC: 111 MG/DL — HIGH (ref 70–99)
HCT VFR BLD CALC: 34.4 % — LOW (ref 37–47)
HGB BLD-MCNC: 11.4 G/DL — LOW (ref 12–16)
IMM GRANULOCYTES NFR BLD AUTO: 0.4 % — HIGH (ref 0.1–0.3)
LYMPHOCYTES # BLD AUTO: 1.21 K/UL — SIGNIFICANT CHANGE UP (ref 1.2–3.4)
LYMPHOCYTES # BLD AUTO: 9.6 % — LOW (ref 20.5–51.1)
MAGNESIUM SERPL-MCNC: 2 MG/DL — SIGNIFICANT CHANGE UP (ref 1.8–2.4)
MCHC RBC-ENTMCNC: 30.6 PG — SIGNIFICANT CHANGE UP (ref 27–31)
MCHC RBC-ENTMCNC: 33.1 G/DL — SIGNIFICANT CHANGE UP (ref 32–37)
MCV RBC AUTO: 92.2 FL — SIGNIFICANT CHANGE UP (ref 81–99)
MONOCYTES # BLD AUTO: 1.83 K/UL — HIGH (ref 0.1–0.6)
MONOCYTES NFR BLD AUTO: 14.5 % — HIGH (ref 1.7–9.3)
NEUTROPHILS # BLD AUTO: 9.47 K/UL — HIGH (ref 1.4–6.5)
NEUTROPHILS NFR BLD AUTO: 75.3 % — HIGH (ref 42.2–75.2)
NRBC # BLD: 0 /100 WBCS — SIGNIFICANT CHANGE UP (ref 0–0)
PHOSPHATE SERPL-MCNC: 3.3 MG/DL — SIGNIFICANT CHANGE UP (ref 2.1–4.9)
PLATELET # BLD AUTO: 124 K/UL — LOW (ref 130–400)
POTASSIUM SERPL-MCNC: 3.9 MMOL/L — SIGNIFICANT CHANGE UP (ref 3.5–5)
POTASSIUM SERPL-SCNC: 3.9 MMOL/L — SIGNIFICANT CHANGE UP (ref 3.5–5)
PROT SERPL-MCNC: 6.1 G/DL — SIGNIFICANT CHANGE UP (ref 6–8)
RBC # BLD: 3.73 M/UL — LOW (ref 4.2–5.4)
RBC # FLD: 12.5 % — SIGNIFICANT CHANGE UP (ref 11.5–14.5)
SODIUM SERPL-SCNC: 142 MMOL/L — SIGNIFICANT CHANGE UP (ref 135–146)
WBC # BLD: 12.58 K/UL — HIGH (ref 4.8–10.8)
WBC # FLD AUTO: 12.58 K/UL — HIGH (ref 4.8–10.8)

## 2021-05-15 PROCEDURE — 93010 ELECTROCARDIOGRAM REPORT: CPT

## 2021-05-15 PROCEDURE — 99233 SBSQ HOSP IP/OBS HIGH 50: CPT

## 2021-05-15 RX ORDER — FUROSEMIDE 40 MG
40 TABLET ORAL ONCE
Refills: 0 | Status: COMPLETED | OUTPATIENT
Start: 2021-05-15 | End: 2021-05-15

## 2021-05-15 RX ADMIN — Medication 40 MILLIGRAM(S): at 10:29

## 2021-05-15 RX ADMIN — ENOXAPARIN SODIUM 40 MILLIGRAM(S): 100 INJECTION SUBCUTANEOUS at 22:22

## 2021-05-15 RX ADMIN — OXYCODONE HYDROCHLORIDE 10 MILLIGRAM(S): 5 TABLET ORAL at 20:30

## 2021-05-15 RX ADMIN — OXYCODONE HYDROCHLORIDE 10 MILLIGRAM(S): 5 TABLET ORAL at 20:02

## 2021-05-15 RX ADMIN — OXYCODONE HYDROCHLORIDE 5 MILLIGRAM(S): 5 TABLET ORAL at 07:50

## 2021-05-15 RX ADMIN — OXYCODONE HYDROCHLORIDE 5 MILLIGRAM(S): 5 TABLET ORAL at 08:52

## 2021-05-15 RX ADMIN — Medication 100 MILLIGRAM(S): at 05:53

## 2021-05-15 RX ADMIN — Medication 100 MILLIGRAM(S): at 14:26

## 2021-05-15 NOTE — PROGRESS NOTE ADULT - SUBJECTIVE AND OBJECTIVE BOX
Orthopaedics Progress Note    MANDO DELA CRUZ  [id number]    Patient is a 80y year old Female s/p left CMN 4/14  POD#1  Patient seen and examined bedside  Pain well controlled  No other complaints    ceFAZolin   IVPB 2000 milliGRAM(s) IV Intermittent every 8 hours  enoxaparin Injectable 40 milliGRAM(s) SubCutaneous at bedtime  oxyCODONE    IR 5 milliGRAM(s) Oral every 4 hours PRN  oxyCODONE    IR 10 milliGRAM(s) Oral every 6 hours PRN  polyethylene glycol 3350 17 Gram(s) Oral at bedtime  senna 2 Tablet(s) Oral at bedtime      T(C): 36.7 (05-15-21 @ 05:19), Max: 37.2 (05-14-21 @ 13:06)  HR: 80 (05-15-21 @ 05:19) (80 - 91)  BP: 145/72 (05-15-21 @ 05:19) (133/84 - 159/83)  RR: 20 (05-14-21 @ 21:25) (14 - 26)  SpO2: 97% (05-14-21 @ 18:55) (94% - 99%)    Physical Exam  NAD  Breathing comfortably on RA  Resting comfortably    ***UE  Dressing c/d/i  Motor: AIN/PIN/Ulnar intact  Sensory: Ax/M/R/U intact  Vasc: hand WWP, 2+ radial pulse    LLE  Dressing c/d/i  Motor: TA/EHL/Gastroc intact  Sensory: SP/DP/Augustin/Sa intact  Vasc: foot WWP, 2+ DP pulse    Labs                        11.4   9.28  )-----------( 106      ( 14 May 2021 06:34 )             34.8     05-14    142  |  108  |  10  ----------------------------<  94  3.6   |  25  |  0.5<L>    Ca    8.7      14 May 2021 06:34  Phos  2.7     05-14  Mg     1.9     05-14    TPro  5.5<L>  /  Alb  3.5  /  TBili  1.0  /  DBili  x   /  AST  24  /  ALT  9   /  AlkPhos  72  05-14    LIVER FUNCTIONS - ( 14 May 2021 06:34 )  Alb: 3.5 g/dL / Pro: 5.5 g/dL / ALK PHOS: 72 U/L / ALT: 9 U/L / AST: 24 U/L / GGT: x             A/P: Patient is a 80y year old Female as above    WBAT LLE  DVT ppx: SCD, LVX  Abx: ancef 24 hrs post op  Pain control  Rest of care per primary team  Trend labs  Dispo: Pending PT recommendations

## 2021-05-15 NOTE — PHYSICAL THERAPY INITIAL EVALUATION ADULT - GENERAL OBSERVATIONS, REHAB EVAL
10:49-11:40 51 min  pt rec in bed in NAD, pt agreeable to PT, pt c/o 5/10 pain at rest, 10/10 pain with movement, pt was already medicated as per RN

## 2021-05-15 NOTE — PROGRESS NOTE ADULT - SUBJECTIVE AND OBJECTIVE BOX
MANDO DELA CRUZ  80y  Female      Patient is a 80y old  Female who presents with a chief complaint of s/p Mechanical fall.       INTERVAL HPI/OVERNIGHT EVENTS: The patient was seen at bedside working with PT.       ******************************* REVIEW OF SYSTEMS:**********************************************    All other review of systems negative    *********************** VITALS ******************************************    T(F): 98 (05-15-21 @ 05:19)  HR: 80 (05-15-21 @ 10:22) (80 - 91)  BP: 129/66 (05-15-21 @ 10:22) (129/66 - 159/83)  RR: 20 (05-14-21 @ 21:25) (14 - 26)  SpO2: 97% (05-14-21 @ 18:55) (94% - 99%)    05-14-21 @ 07:01  -  05-15-21 @ 07:00  --------------------------------------------------------  IN: 290 mL / OUT: 200 mL / NET: 90 mL            05-14-21 @ 07:01  -  05-15-21 @ 07:00  --------------------------------------------------------  IN: 290 mL / OUT: 200 mL / NET: 90 mL        ******************************** PHYSICAL EXAM:**************************************************  GENERAL: NAD    PSYCH: no agitation, baseline mentation  HEENT:     NERVOUS SYSTEM:  Alert & Oriented X3,   PULMONARY: MAIKOL, CTA    CARDIOVASCULAR: S1S2 RRR    GI: Soft, NT, ND; BS present.    EXTREMITIES:  2+ Peripheral Pulses, No clubbing, cyanosis, or edema    LYMPH: No lymphadenopathy noted    SKIN: No rashes or lesions      **************************** LABS *******************************************************                          11.4   12.58 )-----------( 124      ( 15 May 2021 07:55 )             34.4     05-15    142  |  105  |  10  ----------------------------<  111<H>  3.9   |  22  |  0.6<L>    Ca    9.3      15 May 2021 07:55  Phos  3.3     05-15  Mg     2.0     05-15    TPro  6.1  /  Alb  3.9  /  TBili  0.8  /  DBili  x   /  AST  30  /  ALT  12  /  AlkPhos  75  05-15          Lactate Trend  05-13 @ 06:15 Lactate:1.3   05-12 @ 08:40 Lactate:1.4         CAPILLARY BLOOD GLUCOSE              **************************Active Medications *******************************************  latex (Pruritus)  Nickel based metals (Other)  No Known Drug Allergies      ceFAZolin   IVPB 2000 milliGRAM(s) IV Intermittent every 8 hours  enoxaparin Injectable 40 milliGRAM(s) SubCutaneous at bedtime  oxyCODONE    IR 5 milliGRAM(s) Oral every 4 hours PRN  oxyCODONE    IR 10 milliGRAM(s) Oral every 6 hours PRN  polyethylene glycol 3350 17 Gram(s) Oral at bedtime  senna 2 Tablet(s) Oral at bedtime      ***************************************************  RADIOLOGY & ADDITIONAL TESTS:    Imaging Personally Reviewed:  [ ] YES  [ ] NO    HEALTH ISSUES - PROBLEM Dx:  Hip fracture, left  Hip fracture, left    Hip fracture

## 2021-05-15 NOTE — PROGRESS NOTE ADULT - SUBJECTIVE AND OBJECTIVE BOX
Patient is a 80y old  Female who presents with a chief complaint of s/p Mechanical fall (15 May 2021 08:05)      HPI:  81 yo F with PMH of glaucoma b/l (legally blind) presents to the ED for after a mechanical fall out of her bed around 6:30AM this morning. Pt reports she was trying to reach for something on her dresser and rolled out of bed onto her left side. P reports she was unable to get up on her own due to left hip pain. Prior to this event, pt is fairly healthy, can walk without assistant Pt denies trauma to head, LOC, use of blood thinners, headaches, dizziness, neck pain, back pain, abdominal pain, n/v/d/c, weakness, numbness, tingling, urinary symptoms.    ICU Vital Signs Last 24 Hrs  T(C): 36.8 (12 May 2021 08:28), Max: 36.8 (12 May 2021 08:28)  T(F): 98.2 (12 May 2021 08:28), Max: 98.2 (12 May 2021 08:28)  HR: 69 (12 May 2021 08:28) (69 - 69)  BP: 166/83 (12 May 2021 08:28) (166/83 - 166/83)  BP(mean): --  ABP: --  ABP(mean): --  RR: 18 (12 May 2021 08:28) (18 - 18)  SpO2: 98% (12 May 2021 08:28) (98% - 98%)    In ED, pt had Xray of hips showing Lt intertrochanter fx.  Seen by ortho and planned for sx tomorrow.  (12 May 2021 10:31)      Allergies:  latex (Pruritus)  Nickel based metals (Other)  No Known Drug Allergies        Hospital Medications:  ceFAZolin   IVPB 2000 milliGRAM(s) IV Intermittent every 8 hours  enoxaparin Injectable 40 milliGRAM(s) SubCutaneous at bedtime  oxyCODONE    IR 5 milliGRAM(s) Oral every 4 hours PRN  oxyCODONE    IR 10 milliGRAM(s) Oral every 6 hours PRN  polyethylene glycol 3350 17 Gram(s) Oral at bedtime  senna 2 Tablet(s) Oral at bedtime      Vital Signs Last 24 Hrs  T(C): 36.7 (15 May 2021 05:19), Max: 37.2 (14 May 2021 13:06)  T(F): 98 (15 May 2021 05:19), Max: 99 (14 May 2021 13:06)  HR: 80 (15 May 2021 05:19) (80 - 91)  BP: 145/72 (15 May 2021 05:19) (133/84 - 159/83)  BP(mean): 103 (14 May 2021 18:00) (97 - 109)  RR: 20 (14 May 2021 21:25) (14 - 26)  SpO2: 97% (14 May 2021 18:55) (94% - 99%)    I&O's Summary    14 May 2021 07:01  -  15 May 2021 07:00  --------------------------------------------------------  IN: 290 mL / OUT: 200 mL / NET: 90 mL        LABS:                        11.4   12.58 )-----------( 124      ( 15 May 2021 07:55 )             34.4       05-14    142  |  108  |  10  ----------------------------<  94  3.6   |  25  |  0.5<L>    Ca    8.7      14 May 2021 06:34  Phos  2.7     05-14  Mg     1.9     05-14    TPro  5.5<L>  /  Alb  3.5  /  TBili  1.0  /  DBili  x   /  AST  24  /  ALT  9   /  AlkPhos  72  05-14            PHYSICAL EXAM:  GENERAL: NAD, lying in bed comfortably, blind  HEAD:  Atraumatic, Normocephalic  NECK: Supple, No JVD  CHEST/LUNG: Clear to auscultation bilaterally;  HEART: Regular rate and rhythm  ABDOMEN: Soft, Nontender, Nondistended  EXTREMITIES: LLE extended  NERVOUS SYSTEM:  Alert & Oriented X3, speech clear. No deficits

## 2021-05-15 NOTE — PROGRESS NOTE ADULT - PROBLEM SELECTOR PLAN 1
pt tolerated procedure well with no co, nor sob, no arrhythmias and unchanged ekg  may d/c telemetry

## 2021-05-16 LAB
ALBUMIN SERPL ELPH-MCNC: 3.5 G/DL — SIGNIFICANT CHANGE UP (ref 3.5–5.2)
ALP SERPL-CCNC: 68 U/L — SIGNIFICANT CHANGE UP (ref 30–115)
ALT FLD-CCNC: 8 U/L — SIGNIFICANT CHANGE UP (ref 0–41)
ANION GAP SERPL CALC-SCNC: 8 MMOL/L — SIGNIFICANT CHANGE UP (ref 7–14)
AST SERPL-CCNC: 27 U/L — SIGNIFICANT CHANGE UP (ref 0–41)
BASOPHILS # BLD AUTO: 0.03 K/UL — SIGNIFICANT CHANGE UP (ref 0–0.2)
BASOPHILS NFR BLD AUTO: 0.3 % — SIGNIFICANT CHANGE UP (ref 0–1)
BILIRUB SERPL-MCNC: 1.1 MG/DL — SIGNIFICANT CHANGE UP (ref 0.2–1.2)
BUN SERPL-MCNC: 14 MG/DL — SIGNIFICANT CHANGE UP (ref 10–20)
CALCIUM SERPL-MCNC: 8.8 MG/DL — SIGNIFICANT CHANGE UP (ref 8.5–10.1)
CHLORIDE SERPL-SCNC: 103 MMOL/L — SIGNIFICANT CHANGE UP (ref 98–110)
CO2 SERPL-SCNC: 27 MMOL/L — SIGNIFICANT CHANGE UP (ref 17–32)
CREAT SERPL-MCNC: 0.6 MG/DL — LOW (ref 0.7–1.5)
EOSINOPHIL # BLD AUTO: 0.05 K/UL — SIGNIFICANT CHANGE UP (ref 0–0.7)
EOSINOPHIL NFR BLD AUTO: 0.4 % — SIGNIFICANT CHANGE UP (ref 0–8)
GLUCOSE SERPL-MCNC: 100 MG/DL — HIGH (ref 70–99)
HCT VFR BLD CALC: 31.9 % — LOW (ref 37–47)
HGB BLD-MCNC: 10.4 G/DL — LOW (ref 12–16)
IMM GRANULOCYTES NFR BLD AUTO: 0.4 % — HIGH (ref 0.1–0.3)
LYMPHOCYTES # BLD AUTO: 2.58 K/UL — SIGNIFICANT CHANGE UP (ref 1.2–3.4)
LYMPHOCYTES # BLD AUTO: 22.5 % — SIGNIFICANT CHANGE UP (ref 20.5–51.1)
MAGNESIUM SERPL-MCNC: 2 MG/DL — SIGNIFICANT CHANGE UP (ref 1.8–2.4)
MCHC RBC-ENTMCNC: 30.2 PG — SIGNIFICANT CHANGE UP (ref 27–31)
MCHC RBC-ENTMCNC: 32.6 G/DL — SIGNIFICANT CHANGE UP (ref 32–37)
MCV RBC AUTO: 92.7 FL — SIGNIFICANT CHANGE UP (ref 81–99)
MONOCYTES # BLD AUTO: 1.61 K/UL — HIGH (ref 0.1–0.6)
MONOCYTES NFR BLD AUTO: 14.1 % — HIGH (ref 1.7–9.3)
NEUTROPHILS # BLD AUTO: 7.13 K/UL — HIGH (ref 1.4–6.5)
NEUTROPHILS NFR BLD AUTO: 62.3 % — SIGNIFICANT CHANGE UP (ref 42.2–75.2)
NRBC # BLD: 0 /100 WBCS — SIGNIFICANT CHANGE UP (ref 0–0)
PHOSPHATE SERPL-MCNC: 3.2 MG/DL — SIGNIFICANT CHANGE UP (ref 2.1–4.9)
PLATELET # BLD AUTO: 136 K/UL — SIGNIFICANT CHANGE UP (ref 130–400)
POTASSIUM SERPL-MCNC: 4.3 MMOL/L — SIGNIFICANT CHANGE UP (ref 3.5–5)
POTASSIUM SERPL-SCNC: 4.3 MMOL/L — SIGNIFICANT CHANGE UP (ref 3.5–5)
PROT SERPL-MCNC: 5.4 G/DL — LOW (ref 6–8)
RBC # BLD: 3.44 M/UL — LOW (ref 4.2–5.4)
RBC # FLD: 12.9 % — SIGNIFICANT CHANGE UP (ref 11.5–14.5)
SODIUM SERPL-SCNC: 138 MMOL/L — SIGNIFICANT CHANGE UP (ref 135–146)
WBC # BLD: 11.45 K/UL — HIGH (ref 4.8–10.8)
WBC # FLD AUTO: 11.45 K/UL — HIGH (ref 4.8–10.8)

## 2021-05-16 PROCEDURE — 99233 SBSQ HOSP IP/OBS HIGH 50: CPT

## 2021-05-16 RX ADMIN — SENNA PLUS 2 TABLET(S): 8.6 TABLET ORAL at 22:16

## 2021-05-16 RX ADMIN — POLYETHYLENE GLYCOL 3350 17 GRAM(S): 17 POWDER, FOR SOLUTION ORAL at 22:15

## 2021-05-16 RX ADMIN — OXYCODONE HYDROCHLORIDE 10 MILLIGRAM(S): 5 TABLET ORAL at 22:21

## 2021-05-16 RX ADMIN — OXYCODONE HYDROCHLORIDE 10 MILLIGRAM(S): 5 TABLET ORAL at 12:30

## 2021-05-16 RX ADMIN — ENOXAPARIN SODIUM 40 MILLIGRAM(S): 100 INJECTION SUBCUTANEOUS at 22:15

## 2021-05-16 NOTE — PROGRESS NOTE ADULT - SUBJECTIVE AND OBJECTIVE BOX
MANDO DELA CRUZ  80y  Female      Patient is a 80y old  Female who presents with a chief complaint of s/p Mechanical fall.      INTERVAL HPI/OVERNIGHT EVENTS:      ******************************* REVIEW OF SYSTEMS:**********************************************    All other review of systems negative    *********************** VITALS ******************************************    T(F): 98.9 (05-15-21 @ 20:53)  HR: 91 (05-15-21 @ 20:53) (80 - 91)  BP: 120/70 (05-15-21 @ 20:53) (120/70 - 123/68)  RR: 20 (05-15-21 @ 13:55) (20 - 20)  SpO2: 98% (05-15-21 @ 20:43) (96% - 98%)    05-15-21 @ 07:01  -  05-16-21 @ 07:00  --------------------------------------------------------  IN: 0 mL / OUT: 800 mL / NET: -800 mL            05-15-21 @ 07:01  -  05-16-21 @ 07:00  --------------------------------------------------------  IN: 0 mL / OUT: 800 mL / NET: -800 mL        ******************************** PHYSICAL EXAM:**************************************************  GENERAL: NAD    PSYCH: no agitation, baseline mentation  HEENT:     NERVOUS SYSTEM:  Alert & Oriented X3, MS  5/5 B/L  UE and LE ; Sensory intact    PULMONARY: MAIKOL, CTA    CARDIOVASCULAR: S1S2 RRR    GI: Soft, NT, ND; BS present.    EXTREMITIES:  2+ Peripheral Pulses, No clubbing, cyanosis, or edema    LYMPH: No lymphadenopathy noted    SKIN: No rashes or lesions      **************************** LABS *******************************************************                          10.4   11.45 )-----------( 136      ( 16 May 2021 06:12 )             31.9     05-16    138  |  103  |  14  ----------------------------<  100<H>  4.3   |  27  |  0.6<L>    Ca    8.8      16 May 2021 06:12  Phos  3.2     05-16  Mg     2.0     05-16    TPro  5.4<L>  /  Alb  3.5  /  TBili  1.1  /  DBili  x   /  AST  27  /  ALT  8   /  AlkPhos  68  05-16          Lactate Trend  05-13 @ 06:15 Lactate:1.3   05-12 @ 08:40 Lactate:1.4         CAPILLARY BLOOD GLUCOSE              **************************Active Medications *******************************************  latex (Pruritus)  Nickel based metals (Other)  No Known Drug Allergies      enoxaparin Injectable 40 milliGRAM(s) SubCutaneous at bedtime  oxyCODONE    IR 5 milliGRAM(s) Oral every 4 hours PRN  oxyCODONE    IR 10 milliGRAM(s) Oral every 6 hours PRN  polyethylene glycol 3350 17 Gram(s) Oral at bedtime  senna 2 Tablet(s) Oral at bedtime      ***************************************************  RADIOLOGY & ADDITIONAL TESTS:    Imaging Personally Reviewed:  [ ] YES  [ ] NO    HEALTH ISSUES - PROBLEM Dx:  Hip fracture, left  Hip fracture, left    Hip fracture    Aortic stenosis  Aortic stenosis

## 2021-05-16 NOTE — PROGRESS NOTE ADULT - SUBJECTIVE AND OBJECTIVE BOX
MANDO DELA CRUZ 80y Female  MRN#: [id number]   Hospital Day: 4d    SUBJECTIVE  Patient is a 80y old Female who presents with a chief complaint of s/p Mechanical fall (16 May 2021 08:07)  Currently admitted to medicine with the primary diagnosis of Hip fracture, left      INTERVAL HPI AND OVERNIGHT EVENTS:  Patient was examined and seen at bedside. This morning she is resting comfortably in bed and reports no issues or overnight events.    REVIEW OF SYMPTOMS:  CONSTITUTIONAL: No weakness, fevers or chills; No headaches  EYES: No visual changes, eye pain, or discharge  ENT: No vertigo; No ear pain or change in hearing; No sore throat or difficulty swallowing  NECK: No pain or stiffness  RESPIRATORY: No cough, wheezing, or hemoptysis; No shortness of breath  CARDIOVASCULAR: No chest pain or palpitations  GASTROINTESTINAL: No abdominal or epigastric pain; No nausea, vomiting, or hematemesis; No diarrhea or constipation; No melena or hematochezia  GENITOURINARY: No dysuria, frequency or hematuria  MUSCULOSKELETAL: No joint pain, no muscle pain, no weakness  NEUROLOGICAL: No numbness or weakness  SKIN: No itching or rashes    OBJECTIVE  PAST MEDICAL & SURGICAL HISTORY    ALLERGIES:  latex (Pruritus)  Nickel based metals (Other)  No Known Drug Allergies    MEDICATIONS:  STANDING MEDICATIONS  enoxaparin Injectable 40 milliGRAM(s) SubCutaneous at bedtime  polyethylene glycol 3350 17 Gram(s) Oral at bedtime  senna 2 Tablet(s) Oral at bedtime    PRN MEDICATIONS  oxyCODONE    IR 5 milliGRAM(s) Oral every 4 hours PRN  oxyCODONE    IR 10 milliGRAM(s) Oral every 6 hours PRN      VITAL SIGNS: Last 24 Hours  T(C): 37.2 (15 May 2021 20:53), Max: 37.2 (15 May 2021 13:55)  T(F): 98.9 (15 May 2021 20:53), Max: 98.9 (15 May 2021 13:55)  HR: 91 (15 May 2021 20:53) (80 - 91)  BP: 120/70 (15 May 2021 20:53) (120/70 - 129/66)  BP(mean): --  RR: 20 (15 May 2021 13:55) (20 - 20)  SpO2: 98% (15 May 2021 20:43) (96% - 98%)    LABS:                        10.4   11.45 )-----------( 136      ( 16 May 2021 06:12 )             31.9     05-16    138  |  103  |  14  ----------------------------<  100<H>  4.3   |  27  |  0.6<L>    Ca    8.8      16 May 2021 06:12  Phos  3.2     05-16  Mg     2.0     05-16    TPro  5.4<L>  /  Alb  3.5  /  TBili  1.1  /  DBili  x   /  AST  27  /  ALT  8   /  AlkPhos  68  05-16                  RADIOLOGY:      PHYSICAL EXAM:  CONSTITUTIONAL: No acute distress, well-developed, well-groomed, AAOx3  HEAD: Atraumatic, normocephalic  EYES: EOM intact, PERRLA, conjunctiva and sclera clear  ENT: Supple, no masses, no thyromegaly, no bruits, no JVD; moist mucous membranes  PULMONARY: Clear to auscultation bilaterally; no wheezes, rales, or rhonchi  CARDIOVASCULAR: Regular rate and rhythm; no murmurs, rubs, or gallops  GASTROINTESTINAL: Soft, non-tender, non-distended; bowel sounds present  MUSCULOSKELETAL: 2+ peripheral pulses; no clubbing, no cyanosis, no edema  NEUROLOGY: non-focal  SKIN: No rashes or lesions; warm and dry

## 2021-05-16 NOTE — PROGRESS NOTE ADULT - SUBJECTIVE AND OBJECTIVE BOX
Orthopaedics Progress Note    MANDO DELA CRUZ  [id number]    Patient is a 80y year old Female s/p left CMN 4/14  POD#2  Patient seen and examined bedside  Was OOBTC yesterday  Pain well controlled  No other complaints    ceFAZolin   IVPB 2000 milliGRAM(s) IV Intermittent every 8 hours  enoxaparin Injectable 40 milliGRAM(s) SubCutaneous at bedtime  oxyCODONE    IR 5 milliGRAM(s) Oral every 4 hours PRN  oxyCODONE    IR 10 milliGRAM(s) Oral every 6 hours PRN  polyethylene glycol 3350 17 Gram(s) Oral at bedtime  senna 2 Tablet(s) Oral at bedtime      Vital Signs Last 24 Hrs  T(C): 37.2 (15 May 2021 20:53), Max: 37.2 (15 May 2021 13:55)  T(F): 98.9 (15 May 2021 20:53), Max: 98.9 (15 May 2021 13:55)  HR: 91 (15 May 2021 20:53) (80 - 91)  BP: 120/70 (15 May 2021 20:53) (120/70 - 129/66)  BP(mean): --  RR: 20 (15 May 2021 13:55) (20 - 20)  SpO2: 98% (15 May 2021 20:43) (96% - 98%)    Physical Exam  NAD  Breathing comfortably on RA  Resting comfortably    LLE  Dressing c/d/i  Motor: TA/EHL/Gastroc intact  Sensory: SP/DP/Augustin/Sa intact  Vasc: foot WWP, 2+ DP pulse    Labs                                   10.4   11.45 )-----------( 136      ( 16 May 2021 06:12 )               31.9   LIVER FUNCTIONS - ( 16 May 2021 06:12 )  Alb: 3.5 g/dL / Pro: 5.4 g/dL / ALK PHOS: 68 U/L / ALT: 8 U/L / AST: 27 U/L / GGT: x           05-16    138  |  103  |  14  ----------------------------<  100<H>  4.3   |  27  |  0.6<L>    Ca    8.8      16 May 2021 06:12  Phos  3.2     05-16  Mg     2.0     05-16    TPro  5.4<L>  /  Alb  3.5  /  TBili  1.1  /  DBili  x   /  AST  27  /  ALT  8   /  AlkPhos  68  05-16      A/P: Patient is a 80y year old Female  s/p left CMN 4/14    WBAT LLE  DVT ppx: SCD, LVX  Pain control  Rest of care per primary team  Trend labs  Dispo: Pending PT recommendations

## 2021-05-17 LAB
ALBUMIN SERPL ELPH-MCNC: 3.3 G/DL — LOW (ref 3.5–5.2)
ALP SERPL-CCNC: 64 U/L — SIGNIFICANT CHANGE UP (ref 30–115)
ALT FLD-CCNC: 9 U/L — SIGNIFICANT CHANGE UP (ref 0–41)
ANION GAP SERPL CALC-SCNC: 13 MMOL/L — SIGNIFICANT CHANGE UP (ref 7–14)
AST SERPL-CCNC: 23 U/L — SIGNIFICANT CHANGE UP (ref 0–41)
BASOPHILS # BLD AUTO: 0.05 K/UL — SIGNIFICANT CHANGE UP (ref 0–0.2)
BASOPHILS NFR BLD AUTO: 0.5 % — SIGNIFICANT CHANGE UP (ref 0–1)
BILIRUB SERPL-MCNC: 1.3 MG/DL — HIGH (ref 0.2–1.2)
BUN SERPL-MCNC: 15 MG/DL — SIGNIFICANT CHANGE UP (ref 10–20)
CALCIUM SERPL-MCNC: 9.1 MG/DL — SIGNIFICANT CHANGE UP (ref 8.5–10.1)
CHLORIDE SERPL-SCNC: 103 MMOL/L — SIGNIFICANT CHANGE UP (ref 98–110)
CO2 SERPL-SCNC: 26 MMOL/L — SIGNIFICANT CHANGE UP (ref 17–32)
CREAT SERPL-MCNC: 0.5 MG/DL — LOW (ref 0.7–1.5)
EOSINOPHIL # BLD AUTO: 0.23 K/UL — SIGNIFICANT CHANGE UP (ref 0–0.7)
EOSINOPHIL NFR BLD AUTO: 2.3 % — SIGNIFICANT CHANGE UP (ref 0–8)
GLUCOSE SERPL-MCNC: 93 MG/DL — SIGNIFICANT CHANGE UP (ref 70–99)
HCT VFR BLD CALC: 31.1 % — LOW (ref 37–47)
HGB BLD-MCNC: 10.1 G/DL — LOW (ref 12–16)
IMM GRANULOCYTES NFR BLD AUTO: 0.4 % — HIGH (ref 0.1–0.3)
LYMPHOCYTES # BLD AUTO: 2.59 K/UL — SIGNIFICANT CHANGE UP (ref 1.2–3.4)
LYMPHOCYTES # BLD AUTO: 25.5 % — SIGNIFICANT CHANGE UP (ref 20.5–51.1)
MAGNESIUM SERPL-MCNC: 2 MG/DL — SIGNIFICANT CHANGE UP (ref 1.8–2.4)
MCHC RBC-ENTMCNC: 30.6 PG — SIGNIFICANT CHANGE UP (ref 27–31)
MCHC RBC-ENTMCNC: 32.5 G/DL — SIGNIFICANT CHANGE UP (ref 32–37)
MCV RBC AUTO: 94.2 FL — SIGNIFICANT CHANGE UP (ref 81–99)
MONOCYTES # BLD AUTO: 1.45 K/UL — HIGH (ref 0.1–0.6)
MONOCYTES NFR BLD AUTO: 14.3 % — HIGH (ref 1.7–9.3)
NEUTROPHILS # BLD AUTO: 5.78 K/UL — SIGNIFICANT CHANGE UP (ref 1.4–6.5)
NEUTROPHILS NFR BLD AUTO: 57 % — SIGNIFICANT CHANGE UP (ref 42.2–75.2)
NRBC # BLD: 0 /100 WBCS — SIGNIFICANT CHANGE UP (ref 0–0)
PHOSPHATE SERPL-MCNC: 3.6 MG/DL — SIGNIFICANT CHANGE UP (ref 2.1–4.9)
PLATELET # BLD AUTO: 151 K/UL — SIGNIFICANT CHANGE UP (ref 130–400)
POTASSIUM SERPL-MCNC: 4.3 MMOL/L — SIGNIFICANT CHANGE UP (ref 3.5–5)
POTASSIUM SERPL-SCNC: 4.3 MMOL/L — SIGNIFICANT CHANGE UP (ref 3.5–5)
PROT SERPL-MCNC: 5.4 G/DL — LOW (ref 6–8)
RBC # BLD: 3.3 M/UL — LOW (ref 4.2–5.4)
RBC # FLD: 13 % — SIGNIFICANT CHANGE UP (ref 11.5–14.5)
SARS-COV-2 RNA SPEC QL NAA+PROBE: SIGNIFICANT CHANGE UP
SODIUM SERPL-SCNC: 142 MMOL/L — SIGNIFICANT CHANGE UP (ref 135–146)
WBC # BLD: 10.14 K/UL — SIGNIFICANT CHANGE UP (ref 4.8–10.8)
WBC # FLD AUTO: 10.14 K/UL — SIGNIFICANT CHANGE UP (ref 4.8–10.8)

## 2021-05-17 PROCEDURE — 99233 SBSQ HOSP IP/OBS HIGH 50: CPT

## 2021-05-17 RX ADMIN — OXYCODONE HYDROCHLORIDE 10 MILLIGRAM(S): 5 TABLET ORAL at 09:53

## 2021-05-17 RX ADMIN — ENOXAPARIN SODIUM 40 MILLIGRAM(S): 100 INJECTION SUBCUTANEOUS at 21:09

## 2021-05-17 RX ADMIN — SENNA PLUS 2 TABLET(S): 8.6 TABLET ORAL at 21:09

## 2021-05-17 RX ADMIN — POLYETHYLENE GLYCOL 3350 17 GRAM(S): 17 POWDER, FOR SOLUTION ORAL at 21:09

## 2021-05-17 NOTE — CONSULT NOTE ADULT - SUBJECTIVE AND OBJECTIVE BOX
Patient is a 80y old  Female who presents with a chief complaint of s/p Mechanical fall (13 May 2021 10:59)      HPI:  81 yo F with PMH of glaucoma b/l (legally blind)retinal vein occlusion, severe aortic stenosis presents to the ED for after a mechanical fall out of her bed around 6:30AM this morning. Pt reports she was trying to reach for something on her dresser and rolled out of bed onto her left side. P reports she was unable to get up on her own due to left hip pain. Prior to this event, pt is fairly healthy, can walk without assistant.  pt is using the rower .   Pt denies trauma to head, LOC, use of blood thinners, headaches, dizziness, neck pain, back pain, abdominal pain, n/v/d/c, weakness, numbness, tingling, urinary symptoms.    ICU Vital Signs Last 24 Hrs  T(C): 36.8 (12 May 2021 08:28), Max: 36.8 (12 May 2021 08:28)  T(F): 98.2 (12 May 2021 08:28), Max: 98.2 (12 May 2021 08:28)  HR: 69 (12 May 2021 08:28) (69 - 69)  BP: 166/83 (12 May 2021 08:28) (166/83 - 166/83)  BP(mean): --  ABP: --  ABP(mean): --  RR: 18 (12 May 2021 08:28) (18 - 18)  SpO2: 98% (12 May 2021 08:28) (98% - 98%)    In ED, pt had Xray of hips showing Lt intertrochanter fx.  Seen by ortho and planned for sx tomorrow.  (12 May 2021 10:31)      PAST MEDICAL & SURGICAL HISTORY:      PREVIOUS DIAGNOSTIC TESTING:      ECHO  FINDINGS:echoSummary:   1. LV Ejection Fraction by Dennis's Method with a biplane EF of 70 %.   2. Moderate left ventricular hypertrophy.   3. Spectral Doppler shows impaired relaxation pattern of left ventricular myocardial filling (Grade I diastolic dysfunction).   4. There is moderate LVOT gradient.   5. Moderately enlarged left atrium.   6. Normal right atrial size.   7. Mitral annular calcification.   8. Thickening and calcification of the anterior and posterior mitral valve leaflets.   9. Trace mitral valve regurgitation.  10. Mild tricuspid regurgitation.  11. Peak transaortic gradient equals 73.1 mmHg, mean transaortic gradient equals 41.4 mmHg, the calculated aortic valve area equals 0.94 cm² by the continuity equation consistent with severe aortic stenosis.        MEDICATIONS  (STANDING):  enoxaparin Injectable 40 milliGRAM(s) SubCutaneous at bedtime  sodium chloride 0.9%. 1000 milliLiter(s) (50 mL/Hr) IV Continuous <Continuous>    MEDICATIONS  (PRN):  acetaminophen   Tablet .. 650 milliGRAM(s) Oral every 4 hours PRN Mild Pain (1 - 3)  morphine  - Injectable 2 milliGRAM(s) IV Push every 4 hours PRN Severe Pain (7 - 10)  traMADol 50 milliGRAM(s) Oral every 6 hours PRN Moderate Pain (4 - 6)      FAMILY HISTORY: F cva 69,   M unknown  [id number]  B49 CVA      SOCIAL HISTORY:  CIGARETTES:quit 1974  ALCOHOL: social   DRUGS:none                      REVIEW OF SYSTEMS:  CONSTITUTIONAL: No distress, Looks stable  NECK: No pain   RESPIRATORY: No cough, wheezing, shortness of breath  CARDIOVASCULAR: No chest pain, SOB, palpitations, leg swelling  GASTROINTESTINAL: No abdominal or epigastric pain. No nausea, vomiting, or hematemesis;  No melena.  NEUROLOGICAL: No dizziness, headaches, memory loss, loss of strength  SKIN: No itching, burning, rashes, or lesions   ENDOCRINE: No heat or cold intolerance  MUSCULOSKELETAL: (+) joint pain, No  swelling; No muscle pain  PSYCHIATRIC: No depression, anxiety, mood swings, or difficulty sleeping  ALLERGY: No hives, itching, rash          Vital Signs Last 24 Hrs  T(C): 36.3 (13 May 2021 08:00), Max: 37.6 (13 May 2021 00:23)  T(F): 97.3 (13 May 2021 08:00), Max: 99.7 (13 May 2021 00:23)  HR: 76 (13 May 2021 08:00) (76 - 86)  BP: 160/83 (13 May 2021 08:00) (128/67 - 160/83)  BP(mean): --  RR: 18 (13 May 2021 08:00) (18 - 18)  SpO2: --                      PHYSICAL EXAM:  GENERAL: No distress, well developed  HEAD:  Atraumatic, Normocephalic  NECK: Supple, No JVD, No Bruit of either carotid arteries  NERVOUS SYSTEM:  Alert, Awake, Oriented to time, place, person; Normal memory and speech; Normal motor Strength 5/5 B/L upper and lower extremities  CHEST/LUNG: Normal air entry to lung base bilaterally; No wheeze, crackle, rales, rhonchi  HEART: Regular heart beat, S1, A2, P2, No S3, No S4, No gallop, No murmur  ABDOMEN: Soft, Non tender, Non distended; Bowel sounds present  EXTREMITIES:  2+ Peripheral Pulses, No clubbing, No edema  SKIN: No rashes or lesions    ECG:Diagnosis Line Normal sinus rhythm  Possible Left atrial enlargement  Borderline ECG        I&O's Detail    12 May 2021 07:01  -  13 May 2021 07:00  --------------------------------------------------------  IN:  Total IN: 0 mL    OUT:    Voided (mL): 100 mL  Total OUT: 100 mL    Total NET: -100 mL          LABS:                        12.2   8.98  )-----------( 122      ( 13 May 2021 06:15 )             37.4     05-13    141  |  108  |  13  ----------------------------<  95  3.9   |  23  |  0.5<L>    Ca    9.0      13 May 2021 06:15  Phos  2.9     05-13  Mg     2.1     05-13    TPro  5.9<L>  /  Alb  3.8  /  TBili  1.1  /  DBili  x   /  AST  25  /  ALT  11  /  AlkPhos  84  05-13    CARDIAC MARKERS ( 12 May 2021 08:40 )  x     / x     / 133 U/L / x     / x          PT/INR - ( 13 May 2021 06:15 )   PT: 12.80 sec;   INR: 1.11 ratio         PTT - ( 13 May 2021 06:15 )  PTT:37.8 sec    I&O's Summary    12 May 2021 07:01  -  13 May 2021 07:00  --------------------------------------------------------  IN: 0 mL / OUT: 100 mL / NET: -100 mL        RADIOLOGY & ADDITIONAL STUDIES:
HPI:  79 yo F with PMH of glaucoma b/l (legally blind) presents to the ED for after a mechanical fall out of her bed around 6:30AM this morning. Pt reports she was trying to reach for something on her dresser and rolled out of bed onto her left side. P reports she was unable to get up on her own due to left hip pain. Prior to this event, pt is fairly healthy, can walk without assistant Pt denies trauma to head, LOC, use of blood thinners, headaches, dizziness, neck pain, back pain, abdominal pain, n/v/d/c, weakness, numbness, tingling, urinary symptoms.    ICU Vital Signs Last 24 Hrs  T(C): 36.8 (12 May 2021 08:28), Max: 36.8 (12 May 2021 08:28)  T(F): 98.2 (12 May 2021 08:28), Max: 98.2 (12 May 2021 08:28)  HR: 69 (12 May 2021 08:28) (69 - 69)  BP: 166/83 (12 May 2021 08:28) (166/83 - 166/83)  BP(mean): --  ABP: --  ABP(mean): --  RR: 18 (12 May 2021 08:28) (18 - 18)  SpO2: 98% (12 May 2021 08:28) (98% - 98%)    In ED, pt had Xray of hips showing Lt intertrochanter fx.  Seen by ortho and planned for sx tomorrow. s/p orif on 5/14, wbat      PAST MEDICAL & SURGICAL HISTORY:      Hospital Course:    TODAY'S SUBJECTIVE & REVIEW OF SYMPTOMS:     Constitutional WNL   Cardio WNL   Resp WNL   GI WNL  Heme WNL  Endo WNL  Skin WNL  MSK pain  Neuro WNL  Cognitive WNL  Psych WNL      MEDICATIONS  (STANDING):  enoxaparin Injectable 40 milliGRAM(s) SubCutaneous at bedtime  polyethylene glycol 3350 17 Gram(s) Oral at bedtime  senna 2 Tablet(s) Oral at bedtime    MEDICATIONS  (PRN):  oxyCODONE    IR 5 milliGRAM(s) Oral every 4 hours PRN Mild Pain (1 - 3)  oxyCODONE    IR 10 milliGRAM(s) Oral every 6 hours PRN Moderate Pain (4 - 6)      FAMILY HISTORY:      Allergies    latex (Pruritus)  Nickel based metals (Other)  No Known Drug Allergies    Intolerances        SOCIAL HISTORY:    [  ] Etoh  [  ] Smoking  [  ] Substance abuse     Home Environment:  [   ] Home Alone  [ x  ] Lives with Family  [   ] Home Health Aid    Dwelling:  [ x  ] Apartment  [   ] Private House  [   ] Adult Home  [   ] Skilled Nursing Facility      [   ] Short Term  [   ] Long Term  [   ] Stairs       Elevator [ x  ]    FUNCTIONAL STATUS PTA: (Check all that apply)  Ambulation: [  x  ]Independent    [   ] Dependent     [   ] Non-Ambulatory  Assistive Device: [  x ] SA Cane  [   ]  Q Cane  [   ] Walker  [   ]  Wheelchair  ADL : [ x  ] Independent  [    ]  Dependent       Vital Signs Last 24 Hrs  T(C): 37.2 (17 May 2021 14:15), Max: 37.3 (16 May 2021 20:20)  T(F): 98.9 (17 May 2021 14:15), Max: 99.2 (16 May 2021 20:20)  HR: 101 (17 May 2021 14:15) (82 - 101)  BP: 121/59 (17 May 2021 14:15) (100/55 - 131/65)  BP(mean): --  RR: 18 (17 May 2021 14:15) (18 - 19)  SpO2: --      PHYSICAL EXAM: Awake & Alert  GENERAL: NAD  HEAD:  Normocephalic  CHEST/LUNG: Clear   HEART: S1S2+  ABDOMEN: Soft, Nontender  EXTREMITIES:  no calf tenderness    NERVOUS SYSTEM:  Cranial Nerves 2-12 intact [   ] Abnormal  [ x  ]blind  ROM: WFL all extremities [   ]  Abnormal [ x  ]limited lle  Motor Strength: WFL all extremities  [   ]  Abnormal [ x  ]limited lle  Sensation: intact to light touch [ x  ] Abnormal [   ]    FUNCTIONAL STATUS:  Bed Mobility: Independent [   ]  Supervision [   ]  Needs Assistance [ x  ]  N/A [   ]  Transfers: Independent [   ]  Supervision [   ]  Needs Assistance [ x ]  N/A [   ]   Ambulation: Independent [   ]  Supervision [   ]  Needs Assistance [   ]  N/A [   ]  ADL: Independent [   ] Requires Assistance [   ] N/A [   ]      LABS:                        10.1   10.14 )-----------( 151      ( 17 May 2021 05:54 )             31.1     05-17    142  |  103  |  15  ----------------------------<  93  4.3   |  26  |  0.5<L>    Ca    9.1      17 May 2021 05:54  Phos  3.6     05-17  Mg     2.0     05-17    TPro  5.4<L>  /  Alb  3.3<L>  /  TBili  1.3<H>  /  DBili  x   /  AST  23  /  ALT  9   /  AlkPhos  64  05-17          RADIOLOGY & ADDITIONAL STUDIES:  
Orthopaedics Consult Note    MANDO GONZALEZ  [id number]  Ph: Daughter Azucena Gonzalez: [phone]    Time consult called: 9:20 am  Time patient seen: 9:40 am    Patient is a 80y year old Female with complaint of left hip pain and inability to walk after fall out of bed earlier today trying to reach for something on her nightstand.  Denies head strike, denies LOC. Imaging demonstrated L intertrochanteric femur fracture.  Baseline ambulates independently with walking stick (legally blind). Lives with her daughter but performs all her own ADLs and can walk 2 miles at baseline. Denies pain or injury elsewhere.  Denies numbness/tingling/radiating pain.      PMH/PSH: B/L glaucoma, myomectomy (20+ years ago)      Medications: eye drops      Allergies  No Known Allergies        T(C): 36.8 (05-12-21 @ 08:28), Max: 36.8 (05-12-21 @ 08:28)  HR: 69 (05-12-21 @ 08:28) (69 - 69)  BP: 166/83 (05-12-21 @ 08:28) (166/83 - 166/83)  RR: 18 (05-12-21 @ 08:28) (18 - 18)  SpO2: 98% (05-12-21 @ 08:28) (98% - 98%)    Physical Exam  NAD  Breathing comfortably on RA  Resting comfortably    B/L UE  skin intact, full painless AROM, nonTTP throughout  Motor intact ax/ain/pin/io  SILT ax/m/r/u  palpable radial pulse, fingers wwp      LLE  Skin intact  shortened, externally rotated extremity  (+)TTP at hip  (+)pain w/ log roll/axial load  NonTTP elsewhere in extremity  Motor: TA/EHL/FHL/gastroc intact  SILT s/s/sp/dp/t  foot/toes wwp, palpable DP pulse      RLE  skin intact  No deformity/laceration/abrasion noted  No swelling/erythema/ecchymosis noted  Motor: TA/EHL/FHL/Gastroc intact  SILT s/s/sp/dp/t  foot/toes wwp, palpable DP pulse    Labs                        13.5   8.49  )-----------( 147      ( 12 May 2021 08:40 )             41.0     05-12    139  |  106  |  17  ----------------------------<  110<H>  6.1<HH>   |  22  |  0.6<L>    Ca    9.1      12 May 2021 08:40    TPro  6.7  /  Alb  4.0  /  TBili  0.7  /  DBili  x   /  AST  72<H>  /  ALT  18  /  AlkPhos  89  05-12    LIVER FUNCTIONS - ( 12 May 2021 08:40 )  Alb: 4.0 g/dL / Pro: 6.7 g/dL / ALK PHOS: 89 U/L / ALT: 18 U/L / AST: 72 U/L / GGT: x           PT/INR - ( 12 May 2021 08:40 )   PT: 11.60 sec;   INR: 1.01 ratio         PTT - ( 12 May 2021 08:40 )  PTT:34.7 sec    Img  XR ap pelvis, left hip, femur, knee: displaced intertrochanteric femur fracture       A/P: Patient is a 80y year old Female with L intertrochanteric femur fracture    - Medicine consult   - Pt's cardiologist at [facility]: Dr. Smith  - nonweightbearing to LLE  - Counseled patient/family that surgical fixation is necessary in order to resume function/improve pain.  R/B/C/A discussed at length and patient/family amenable to plan.  Patient added on to OR schedule for Left Hip ORIF for tomorrow  - NPO at midnight/IV fluids  - please obtain all pre-op labs/studies (CBC, CMP, Type and Screen x 2, PT/PTT/INR, EKG, CXR)  - Rapid COVID test  - 2u pRBC on hold for OR  - DVT ppx:  (give stat does of chem DVT ppx tonight, hold in AM before surgery), SCDs  - Home meds to be resumed  - pain control PRN  - will need clearance/risk stratification note from primary team prior to proceeding with surgery, with note signed by medicine attending  - Ortho to follow  - remainder of care per primary team

## 2021-05-17 NOTE — PROGRESS NOTE ADULT - SUBJECTIVE AND OBJECTIVE BOX
MANDO DELA CRUZ 80y Female  MRN#: [id number]   CODE STATUS:________      SUBJECTIVE  Patient is a 80y old Female who presents with a chief complaint of s/p Mechanical fall (17 May 2021 08:07)  Currently admitted to medicine with the primary diagnosis of Hip fracture, left      Today is hospital day 5d, and this morning she is           OBJECTIVE  PAST MEDICAL & SURGICAL HISTORY    ALLERGIES:  latex (Pruritus)  Nickel based metals (Other)  No Known Drug Allergies    MEDICATIONS:  STANDING MEDICATIONS  enoxaparin Injectable 40 milliGRAM(s) SubCutaneous at bedtime  polyethylene glycol 3350 17 Gram(s) Oral at bedtime  senna 2 Tablet(s) Oral at bedtime    PRN MEDICATIONS  oxyCODONE    IR 5 milliGRAM(s) Oral every 4 hours PRN  oxyCODONE    IR 10 milliGRAM(s) Oral every 6 hours PRN      VITAL SIGNS: Last 24 Hours  T(C): 37.2 (17 May 2021 14:15), Max: 37.3 (16 May 2021 20:20)  T(F): 98.9 (17 May 2021 14:15), Max: 99.2 (16 May 2021 20:20)  HR: 101 (17 May 2021 14:15) (82 - 101)  BP: 121/59 (17 May 2021 14:15) (100/55 - 131/65)  BP(mean): --  RR: 18 (17 May 2021 14:15) (18 - 19)  SpO2: --    LABS:                        10.1   10.14 )-----------( 151      ( 17 May 2021 05:54 )             31.1     05-17    142  |  103  |  15  ----------------------------<  93  4.3   |  26  |  0.5<L>    Ca    9.1      17 May 2021 05:54  Phos  3.6     05-17  Mg     2.0     05-17    TPro  5.4<L>  /  Alb  3.3<L>  /  TBili  1.3<H>  /  DBili  x   /  AST  23  /  ALT  9   /  AlkPhos  64  05-17                  RADIOLOGY:      PHYSICAL EXAM:    GENERAL: NAD, well-developed, AAOx3  HEENT:  Atraumatic, Normocephalic. EOMI, PERRLA, conjunctiva and sclera clear, No JVD  PULMONARY: Clear to auscultation bilaterally; No wheeze  CARDIOVASCULAR: Regular rate and rhythm; No murmurs, rubs, or gallops  GASTROINTESTINAL: Soft, Nontender, Nondistended; Bowel sounds present  MUSCULOSKELETAL:  2+ Peripheral Pulses, No clubbing, cyanosis, or edema  NEUROLOGY: non-focal  SKIN: No rashes or lesions       MANDO DELA CRUZ 80y Female  MRN#: [id number]   CODE STATUS:________      SUBJECTIVE  Patient is a 80y old Female who presents with a chief complaint of s/p Mechanical fall (17 May 2021 08:07)  Currently admitted to medicine with the primary diagnosis of Hip fracture, left      Today is hospital day 5d.  No acute events overnight. Patient continues to endorse left hip pain but notes that its much improved with the pain medication.            OBJECTIVE  PAST MEDICAL & SURGICAL HISTORY    ALLERGIES:  latex (Pruritus)  Nickel based metals (Other)  No Known Drug Allergies    MEDICATIONS:  STANDING MEDICATIONS  enoxaparin Injectable 40 milliGRAM(s) SubCutaneous at bedtime  polyethylene glycol 3350 17 Gram(s) Oral at bedtime  senna 2 Tablet(s) Oral at bedtime    PRN MEDICATIONS  oxyCODONE    IR 5 milliGRAM(s) Oral every 4 hours PRN  oxyCODONE    IR 10 milliGRAM(s) Oral every 6 hours PRN      VITAL SIGNS: Last 24 Hours  T(C): 37.2 (17 May 2021 14:15), Max: 37.3 (16 May 2021 20:20)  T(F): 98.9 (17 May 2021 14:15), Max: 99.2 (16 May 2021 20:20)  HR: 101 (17 May 2021 14:15) (82 - 101)  BP: 121/59 (17 May 2021 14:15) (100/55 - 131/65)  BP(mean): --  RR: 18 (17 May 2021 14:15) (18 - 19)  SpO2: --    LABS:                        10.1   10.14 )-----------( 151      ( 17 May 2021 05:54 )             31.1     05-17    142  |  103  |  15  ----------------------------<  93  4.3   |  26  |  0.5<L>    Ca    9.1      17 May 2021 05:54  Phos  3.6     05-17  Mg     2.0     05-17    TPro  5.4<L>  /  Alb  3.3<L>  /  TBili  1.3<H>  /  DBili  x   /  AST  23  /  ALT  9   /  AlkPhos  64  05-17                  RADIOLOGY:      PHYSICAL EXAM:    GENERAL: NAD, well-developed, AAOx3  HEENT:  Atraumatic, Normocephalic. EOMI, PERRLA, conjunctiva and sclera clear, No JVD  PULMONARY: Clear to auscultation bilaterally; No wheeze  CARDIOVASCULAR: Regular rate and rhythm; No murmurs, rubs, or gallops  GASTROINTESTINAL: Soft, Nontender, Nondistended; Bowel sounds present  MUSCULOSKELETAL:  2+ Peripheral Pulses, No clubbing, cyanosis, or edema  NEUROLOGY: non-focal  SKIN: No rashes or lesions

## 2021-05-17 NOTE — PROGRESS NOTE ADULT - SUBJECTIVE AND OBJECTIVE BOX
Orthopaedics Progress Note    MANDO DELA CRUZ  [id number]    Patient is a 80y year old Female with left hip fracture  POD#3 from left hip intramedullary nailing   Patient seen and examined bedside  Pain well controlled  No other complaints    enoxaparin Injectable 40 milliGRAM(s) SubCutaneous at bedtime  oxyCODONE    IR 5 milliGRAM(s) Oral every 4 hours PRN  oxyCODONE    IR 10 milliGRAM(s) Oral every 6 hours PRN  polyethylene glycol 3350 17 Gram(s) Oral at bedtime  senna 2 Tablet(s) Oral at bedtime      T(C): 37.3 (05-17-21 @ 05:29), Max: 37.3 (05-16-21 @ 20:20)  HR: 82 (05-17-21 @ 05:29) (82 - 88)  BP: 131/65 (05-17-21 @ 05:29) (100/55 - 131/65)  RR: 18 (05-17-21 @ 05:29) (18 - 19)  SpO2: --    Physical Exam  NAD  Breathing comfortably on RA  Resting comfortably    LLE  Dressing c/d/i  Motor: TA/EHL/Gastroc intact  Sensory: SP/DP/Augustin/Sa intact  Vasc: foot WWP, 2+ DP pulse    Labs                        10.1   10.14 )-----------( 151      ( 17 May 2021 05:54 )             31.1     05-16    138  |  103  |  14  ----------------------------<  100<H>  4.3   |  27  |  0.6<L>    Ca    8.8      16 May 2021 06:12  Phos  3.2     05-16  Mg     2.0     05-16    TPro  5.4<L>  /  Alb  3.5  /  TBili  1.1  /  DBili  x   /  AST  27  /  ALT  8   /  AlkPhos  68  05-16    LIVER FUNCTIONS - ( 16 May 2021 06:12 )  Alb: 3.5 g/dL / Pro: 5.4 g/dL / ALK PHOS: 68 U/L / ALT: 8 U/L / AST: 27 U/L / GGT: x           A/P: Patient is a 80y year old Female as above    WBAT on LLE  DVT ppx: SCD, LVX  Abx: completed prophylaxis  Pain control  Home medications: resume  Trend labs  Dispo: Pending PT recommendations

## 2021-05-17 NOTE — PROGRESS NOTE ADULT - SUBJECTIVE AND OBJECTIVE BOX
MANDO DELA CRUZ  80y  Female  ***My note supersedes ALL resident notes that I sign.  My corrections for their notes are in my note.***    I can be reached directly on [email or web address]. My office number is [phone]. My personal cell number is [phone].    INTERVAL EVENTS: Here for f/u of hip fx. Pt is blind. Had pain - relieved w/ oral meds. No other complaints. Pt lives w/ dtr, but is occ home alone. Uses walking stick to get around. Pt fell OOB at home, which broke hip.    T(F): 98.9 (05-17-21 @ 14:15), Max: 99.2 (05-16-21 @ 20:20)  HR: 101 (05-17-21 @ 14:15) (82 - 101)  BP: 121/59 (05-17-21 @ 14:15) (117/71 - 131/65)  RR: 18 (05-17-21 @ 14:15) (18 - 19)  SpO2: --    Gen: NAD  HEENT: PERRL, EOMI, mouth clr, nose clr  Neck: no nodes, no JVD, thyroid nl  lungs: clr  hrt: s1 s2 rrr 3/6 sys murmur  abd: soft, NT/ND, no HS megaly  ext: no edema, no c/c; surg site on left leg looks fine; mild bruising noted (expected) - no bleeding  neuro: aa ox3, cn intact, can move all 4 ext    LABS:                      10.1    (    94.2   10.14 )-----------( ---------      151      ( 17 May 2021 05:54 )             31.1    (    13.0     142   (   103   (   93      05-17-21 @ 05:54  ----------------------               4.3   (   26   (   15                             -----                        0.5  Ca  9.1   Mg  2.0    P   3.6     LFT  5.4  (  1.3  (  23       05-17-21 @ 05:54  -------------------------  3.3  (  64  (  9    RADIOLOGY & ADDITIONAL TESTS:  < from: Xray Chest 1 View- PORTABLE-Routine (Xray Chest 1 View- PORTABLE-Routine in AM.) (05.13.21 @ 06:26) >  IMPRESSION:    No consolidation, effusion or pneumothorax.    < end of copied text >    < from: Xray Knee 3 Views, Left (05.12.21 @ 09:54) >  Impression:    No evidence of acute fracture or dislocation.    < end of copied text >    < from: Xray Hip 2-3 Views, Left (05.12.21 @ 09:45) >  Impression:    Left intertrochanteric fracture.    < end of copied text >    < from: Xray Chest 1 View AP/PA (05.12.21 @ 09:45) >  Impression:    No radiographic evidence of acute cardiopulmonary disease.    < end of copied text >    MEDICATIONS:    enoxaparin Injectable 40 milliGRAM(s) SubCutaneous at bedtime  oxyCODONE    IR 5 milliGRAM(s) Oral every 4 hours PRN  oxyCODONE    IR 10 milliGRAM(s) Oral every 6 hours PRN  polyethylene glycol 3350 17 Gram(s) Oral at bedtime  senna 2 Tablet(s) Oral at bedtime

## 2021-05-17 NOTE — CONSULT NOTE ADULT - ASSESSMENT
IMPRESSION: Rehab of left hip it fx, s/p orif    PRECAUTIONS: [   ] Cardiac  [   ] Respiratory  [   ] Seizures [   ] Contact Isolation  [   ] Droplet Isolation  [   ] Other    Weight Bearing Status: WBAT LLE    RECOMMENDATION:    Out of Bed to Chair     DVT/Decubiti Prophylaxis    REHAB PLAN:     [  x  ] Bedside P/T 3-5 times a week   [ x   ]   Bedside O/T  2-3 times a week             [    ] Speech Therapy               [    ]  No Rehab Therapy Indicated   Conditioning/ROM                                    ADL  Bed Mobility                                               Conditioning/ROM  Transfers                                                     Bed Mobility  Sitting /Standing Balance                         Transfers                                        Gait Training                                               Sitting/Standing Balance  Stair Training [   ]Applicable                    Home equipment Eval                                                                        Splinting  [   ] Only      GOALS:   ADL   [  x ]   Independent                    Transfers  [  x  ] Independent                          Ambulation  [ x   ] Independent     [  x   ] With device                            [    ]  CG                                                         [    ]  CG                                                                  [    ] CG                            [    ] Min A                                                   [    ] Min A                                                              [    ] Min  A          DISCHARGE PLAN:   [    ]  Good candidate for Intensive Rehabilitation/Hospital based                                             Will tolerate 3hrs Intensive Rehab Daily                                       [   x  ]  Short Term Rehab in Skilled Nursing Facility                                       [     ]  Home with Outpatient or VN services                             va            [  x   ]  Possible Candidate for Intensive Hospital based Rehab

## 2021-05-18 ENCOUNTER — INPATIENT (INPATIENT)
Facility: HOSPITAL | Age: 81
LOS: 14 days | Discharge: HOME HEALTH | End: 2021-06-02
Attending: PHYSICAL MEDICINE & REHABILITATION | Admitting: PHYSICAL MEDICINE & REHABILITATION
Payer: MEDICARE

## 2021-05-18 ENCOUNTER — TRANSCRIPTION ENCOUNTER (OUTPATIENT)
Age: 81
End: 2021-05-18

## 2021-05-18 VITALS
HEART RATE: 79 BPM | TEMPERATURE: 99 F | SYSTOLIC BLOOD PRESSURE: 136 MMHG | DIASTOLIC BLOOD PRESSURE: 57 MMHG | RESPIRATION RATE: 19 BRPM

## 2021-05-18 VITALS
TEMPERATURE: 97 F | DIASTOLIC BLOOD PRESSURE: 72 MMHG | HEART RATE: 83 BPM | RESPIRATION RATE: 18 BRPM | WEIGHT: 154.32 LBS | HEIGHT: 62 IN | SYSTOLIC BLOOD PRESSURE: 124 MMHG

## 2021-05-18 LAB
ALBUMIN SERPL ELPH-MCNC: 3.7 G/DL — SIGNIFICANT CHANGE UP (ref 3.5–5.2)
ALP SERPL-CCNC: 82 U/L — SIGNIFICANT CHANGE UP (ref 30–115)
ALT FLD-CCNC: 10 U/L — SIGNIFICANT CHANGE UP (ref 0–41)
ANION GAP SERPL CALC-SCNC: 13 MMOL/L — SIGNIFICANT CHANGE UP (ref 7–14)
AST SERPL-CCNC: 25 U/L — SIGNIFICANT CHANGE UP (ref 0–41)
BASOPHILS # BLD AUTO: 0.06 K/UL — SIGNIFICANT CHANGE UP (ref 0–0.2)
BASOPHILS NFR BLD AUTO: 0.6 % — SIGNIFICANT CHANGE UP (ref 0–1)
BILIRUB SERPL-MCNC: 1.7 MG/DL — HIGH (ref 0.2–1.2)
BUN SERPL-MCNC: 10 MG/DL — SIGNIFICANT CHANGE UP (ref 10–20)
CALCIUM SERPL-MCNC: 9.1 MG/DL — SIGNIFICANT CHANGE UP (ref 8.5–10.1)
CHLORIDE SERPL-SCNC: 99 MMOL/L — SIGNIFICANT CHANGE UP (ref 98–110)
CO2 SERPL-SCNC: 25 MMOL/L — SIGNIFICANT CHANGE UP (ref 17–32)
CREAT SERPL-MCNC: 0.5 MG/DL — LOW (ref 0.7–1.5)
EOSINOPHIL # BLD AUTO: 0.13 K/UL — SIGNIFICANT CHANGE UP (ref 0–0.7)
EOSINOPHIL NFR BLD AUTO: 1.2 % — SIGNIFICANT CHANGE UP (ref 0–8)
GLUCOSE SERPL-MCNC: 152 MG/DL — HIGH (ref 70–99)
HCT VFR BLD CALC: 33.6 % — LOW (ref 37–47)
HGB BLD-MCNC: 11.2 G/DL — LOW (ref 12–16)
IMM GRANULOCYTES NFR BLD AUTO: 0.7 % — HIGH (ref 0.1–0.3)
LYMPHOCYTES # BLD AUTO: 1.98 K/UL — SIGNIFICANT CHANGE UP (ref 1.2–3.4)
LYMPHOCYTES # BLD AUTO: 18.8 % — LOW (ref 20.5–51.1)
MAGNESIUM SERPL-MCNC: 2.1 MG/DL — SIGNIFICANT CHANGE UP (ref 1.8–2.4)
MCHC RBC-ENTMCNC: 31.3 PG — HIGH (ref 27–31)
MCHC RBC-ENTMCNC: 33.3 G/DL — SIGNIFICANT CHANGE UP (ref 32–37)
MCV RBC AUTO: 93.9 FL — SIGNIFICANT CHANGE UP (ref 81–99)
MONOCYTES # BLD AUTO: 1.2 K/UL — HIGH (ref 0.1–0.6)
MONOCYTES NFR BLD AUTO: 11.4 % — HIGH (ref 1.7–9.3)
NEUTROPHILS # BLD AUTO: 7.08 K/UL — HIGH (ref 1.4–6.5)
NEUTROPHILS NFR BLD AUTO: 67.3 % — SIGNIFICANT CHANGE UP (ref 42.2–75.2)
NRBC # BLD: 0 /100 WBCS — SIGNIFICANT CHANGE UP (ref 0–0)
PHOSPHATE SERPL-MCNC: 4.3 MG/DL — SIGNIFICANT CHANGE UP (ref 2.1–4.9)
PLATELET # BLD AUTO: 193 K/UL — SIGNIFICANT CHANGE UP (ref 130–400)
POTASSIUM SERPL-MCNC: 4.1 MMOL/L — SIGNIFICANT CHANGE UP (ref 3.5–5)
POTASSIUM SERPL-SCNC: 4.1 MMOL/L — SIGNIFICANT CHANGE UP (ref 3.5–5)
PROT SERPL-MCNC: 5.9 G/DL — LOW (ref 6–8)
RBC # BLD: 3.58 M/UL — LOW (ref 4.2–5.4)
RBC # FLD: 12.7 % — SIGNIFICANT CHANGE UP (ref 11.5–14.5)
SODIUM SERPL-SCNC: 137 MMOL/L — SIGNIFICANT CHANGE UP (ref 135–146)
WBC # BLD: 10.52 K/UL — SIGNIFICANT CHANGE UP (ref 4.8–10.8)
WBC # FLD AUTO: 10.52 K/UL — SIGNIFICANT CHANGE UP (ref 4.8–10.8)

## 2021-05-18 PROCEDURE — 93970 EXTREMITY STUDY: CPT | Mod: 26

## 2021-05-18 PROCEDURE — 93010 ELECTROCARDIOGRAM REPORT: CPT

## 2021-05-18 PROCEDURE — 99239 HOSP IP/OBS DSCHRG MGMT >30: CPT

## 2021-05-18 RX ORDER — OXYCODONE HYDROCHLORIDE 5 MG/1
10 TABLET ORAL EVERY 6 HOURS
Refills: 0 | Status: DISCONTINUED | OUTPATIENT
Start: 2021-05-18 | End: 2021-05-25

## 2021-05-18 RX ORDER — ACETAMINOPHEN 500 MG
650 TABLET ORAL EVERY 6 HOURS
Refills: 0 | Status: DISCONTINUED | OUTPATIENT
Start: 2021-05-18 | End: 2021-06-02

## 2021-05-18 RX ORDER — OXYCODONE HYDROCHLORIDE 5 MG/1
1 TABLET ORAL
Qty: 0 | Refills: 0 | DISCHARGE
Start: 2021-05-18

## 2021-05-18 RX ORDER — SENNA PLUS 8.6 MG/1
2 TABLET ORAL AT BEDTIME
Refills: 0 | Status: DISCONTINUED | OUTPATIENT
Start: 2021-05-18 | End: 2021-06-02

## 2021-05-18 RX ORDER — POLYETHYLENE GLYCOL 3350 17 G/17G
17 POWDER, FOR SOLUTION ORAL
Qty: 0 | Refills: 0 | DISCHARGE
Start: 2021-05-18

## 2021-05-18 RX ORDER — ENOXAPARIN SODIUM 100 MG/ML
40 INJECTION SUBCUTANEOUS AT BEDTIME
Refills: 0 | Status: DISCONTINUED | OUTPATIENT
Start: 2021-05-18 | End: 2021-06-02

## 2021-05-18 RX ORDER — POLYETHYLENE GLYCOL 3350 17 G/17G
17 POWDER, FOR SOLUTION ORAL AT BEDTIME
Refills: 0 | Status: DISCONTINUED | OUTPATIENT
Start: 2021-05-18 | End: 2021-05-28

## 2021-05-18 RX ORDER — SENNA PLUS 8.6 MG/1
2 TABLET ORAL
Qty: 0 | Refills: 0 | DISCHARGE
Start: 2021-05-18

## 2021-05-18 RX ORDER — OXYCODONE HYDROCHLORIDE 5 MG/1
5 TABLET ORAL EVERY 4 HOURS
Refills: 0 | Status: DISCONTINUED | OUTPATIENT
Start: 2021-05-18 | End: 2021-05-24

## 2021-05-18 RX ADMIN — POLYETHYLENE GLYCOL 3350 17 GRAM(S): 17 POWDER, FOR SOLUTION ORAL at 21:47

## 2021-05-18 RX ADMIN — SENNA PLUS 2 TABLET(S): 8.6 TABLET ORAL at 21:47

## 2021-05-18 RX ADMIN — OXYCODONE HYDROCHLORIDE 10 MILLIGRAM(S): 5 TABLET ORAL at 07:16

## 2021-05-18 RX ADMIN — ENOXAPARIN SODIUM 40 MILLIGRAM(S): 100 INJECTION SUBCUTANEOUS at 21:47

## 2021-05-18 NOTE — PROGRESS NOTE ADULT - PROVIDER SPECIALTY LIST ADULT
Internal Medicine
Internal Medicine
Orthopedics
Internal Medicine
Internal Medicine
Orthopedics
Hospitalist
Hospitalist
Internal Medicine
Orthopedics
Orthopedics
Internal Medicine
Cardiology
Internal Medicine
Orthopedics
Internal Medicine

## 2021-05-18 NOTE — OCCUPATIONAL THERAPY INITIAL EVALUATION ADULT - PHYSICAL ASSIST/NONPHYSICAL ASSIST: BED TO CHAIR, REHAB EVAL
OT ant to pt, pt requires increased time during transfer 2* fear and pain present/verbal cues/1 person assist

## 2021-05-18 NOTE — OCCUPATIONAL THERAPY INITIAL EVALUATION ADULT - TRANSFER TRAINING, PT EVAL
Pt will perform transfer from bed to chair and b/s commode with mod assist by discharge to work towards returning to PLOF

## 2021-05-18 NOTE — PATIENT PROFILE ADULT - DO YOU FEEL LIKE HURTING YOURSELF OR OTHERS?
Problem(s) Oriented visit      SUBJECTIVE:                                                    Chente Cerna is a 39 year old male who presents to clinic today for:  Patient presents with:  Recheck Medication: Lexapro/refill    Feels better overall. Taking med daily. No known side effects.   He wonders if it might work just a little better at 15 mg / day. That is the same dose his daughter takes.   Has had a lot of family and personal stress lately.   Wife's mother in law w/ dx cancer  Another friend just passed away unexpectedly. Friend was young. Details not yet available.   He thinks he is handling the stress OK - better than if he was not on any meds.     Thinks he is better able to communicate on meds. Thinking is clearer and concentrates better.   Does OK most days until kids are having a meltdown. Then is tough, but he thinks his rxns are not any more than anyone else in a similar situation.   Sleeping OK unless too much work to do. Then not enough time in bed rather than poor sleep quality.   Work hours have allowed him to sleep in more prn. Helpful.   Work is busy, but good.   He continues to exercise regularly - rode his bike to his visit here today, has helmet w/ him.   Has a hx of asthma - states he grew out of asthma. His brothers were both fine as kids but developed asthma later in life.     Problem list, Medication list, Allergies, and Medical/Social/Surgical histories reviewed in [facility] and updated as appropriate.     ROS:  5 point ROS completed and negative except noted above, including Gen, CV, Resp, GI, MS    Histories:   Patient Active Problem List   Diagnosis     Kidney stone     Health Care Home     Anxiety     Past Medical History:   Diagnosis Date     Kidney stone 3/12/2013     Past Surgical History:   Procedure Laterality Date     GENITOURINARY SURGERY  vasectomy     Social History   Substance Use Topics     Smoking status: Former Smoker     Smokeless tobacco: Never Used      Comment: Quit  at age 20 yo, periodic use until age 23     Alcohol use 1.5 oz/week     3 Cans of beer per week      Comment: ocassional     Family History   Problem Relation Age of Onset     Anxiety Disorder Mother      Hx of Zoloft, now on Lexapro     Thyroid Disease Mother      Heart Defect Father      Replaced heart valve     Anxiety Disorder Brother      On Lexapro     Melanoma Brother      Hypertension Maternal Grandfather      Asthma Paternal Grandfather        OBJECTIVE:                                                    /62  Pulse 74  Wt 85.3 kg (188 lb)  BMI 22.88 kg/m2  Body mass index is 22.88 kg/(m^2).   Gen: Cooperative. No acute distress. Appears fit.   Eyes: PERRL, sclera white.   Neck: Supple, without masses, lymphadenopathy or tenderness.   Heart: Regular rate and rhythm without murmurs, rubs, or gallops.   Lungs: Normal respiratory effort. Lungs are clear to auscultation. Good breath sounds bilaterally.   Musculoskeletal: No lower extremity edema.   Skin: Warm and well perfused.   Neurologic: Alert, grossly intact.   Psych:  Mood and affect are appropriate. Displays good insight into his current stressors.      ASSESSMENT/PLAN:                                                      Chente was seen today for recheck medication.    Diagnoses and all orders for this visit:    Anxiety  -     escitalopram (LEXAPRO) 10 MG tablet; Take 1-2 tablets (10-20 mg) by mouth daily   Discussed dose today at length. Ultimately we opted to continue current dose.   However, he does not know the extent yet of the stressors that happened this week and this morning. I OK'd going to 20 mg daily if he thinks he will need the extra support once the facts are known.   He states he is more likely to just go to 15 mg daily. That is fine w/ me; just asked him to keep me in the loop when he changes his dose in order to keep med list up to date.    Certainly to contact me if things are much worse.     Family history of anxiety  disorder  -     escitalopram (LEXAPRO) 10 MG tablet; Take 1-2 tablets (10-20 mg) by mouth daily    RTC October - we want to check on things before the days start to get shorter very quickly in case we need to get in front of any seasonal component.     The following health maintenance items are reviewed in Epic and correct as of today:  Health Maintenance   Topic Date Due     HIV SCREEN (SYSTEM ASSIGNED)  11/10/1994     INFLUENZA VACCINE (1) 09/01/2018     PHQ-2 Q1 YR  08/16/2019     LIPID SCREEN Q5 YR MALE (SYSTEM ASSIGNED)  06/20/2023     TETANUS IMMUNIZATION (SYSTEM ASSIGNED)  06/20/2028       Katy Sweet MD  Family Medicine    For any issues, please call my office  [facility] at [phone].         no

## 2021-05-18 NOTE — OCCUPATIONAL THERAPY INITIAL EVALUATION ADULT - GROOMING, PREVIOUS LEVEL OF FUNCTION, OT EVAL
independent Alert and oriented, no focal deficits, no motor or sensory deficits. Easily arousable to verbal stimuli. Conversant with slurred speech. +AOB.

## 2021-05-18 NOTE — H&P ADULT - NSHPPHYSICALEXAM_GEN_ALL_CORE
PHYSICAL EXAMINATION   VItals: T(C): 37.2 (05-18-21 @ 14:25), Max: 37.7 (05-17-21 @ 19:47)  HR: 79 (05-18-21 @ 14:25) (78 - 92)  BP: 136/57 (05-18-21 @ 14:25) (130/59 - 141/72)  RR: 19 (05-18-21 @ 14:25) (18 - 20)  SpO2: --    General:[  x ] NAD, Resting Comfortable,   [   ] other:                                HEENT: [   ] NC/AT, EOMI, PERRL , Normal Conjunctivae,   [ x  ] other: completely blind in b/l eyes  Cardio: [ x  ] RRR, no murmer,   [   ] other:                              Pulm: [ x  ] No Respiratory Distress,  Lungs CTAB,   [   ] other:                       Abdomen: [ x  ]ND/NT, Soft,   [   ] other:    : [   ] NO VAUGHAN CATHETER, [   ] VAUGHAN CATHETER- no meatal tear, no discharge, [  x ] other: purewick catheter                                           MSK: [   ] No joint swelling, Full ROM,   [   ] other: left quadriceps swelling, 2 surgical incision cites covered in guaze c/d/i                                         Ext: [   ]No C/C/E, No calf tenderness,   [  x ]other: mild left calf tenderness   Skin: [ x  ]intact,   [   ] other:                                                                   Neurological Examination:  Cognitive: [ x  ] AAO x 3,   [    ]  other:                                                                      Attention:  [ x   ] intact,   [    ]  other:                            Memory: [   x ] intact,    [    ]  other:     Mood/Affect: [ x   ] wnl,    [    ]  other:                                                                             Communication: [ x   ]Fluent, no dysarthria, following commands:  [    ] other:   CN II - XII:  [  x  ] intact,  [    ] other:                                                                                        Motor:   RIGHT UE: [ x  ] WNL,  [   ] other:  LEFT    UE: [ x  ] WNL,  [   ] other:  RIGHT LE: [ x  ] WNL,  [   ] other:   LEFT    LE: [   ] WNL,  [   ] other:    Tone: [ x   ] wnl,   [    ]  other:  DTRs: [ x  ]symmetric, [   ] other:  Coordination:   [ x   ] intact,   [    ] other:                                                                           Sensory: [  x  ] Intact to light touch,   [    ] other:

## 2021-05-18 NOTE — H&P ADULT - NSHPLABSRESULTS_GEN_ALL_CORE
11.2   10.52 )-----------( 193      ( 18 May 2021 08:57 )             33.6     05-18    137  |  99  |  10  ----------------------------<  152<H>  4.1   |  25  |  0.5<L>    Ca    9.1      18 May 2021 08:57  Phos  4.3     05-18  Mg     2.1     05-18    TPro  5.9<L>  /  Alb  3.7  /  TBili  1.7<H>  /  DBili  x   /  AST  25  /  ALT  10  /  AlkPhos  82  05-18        EXAM:  XR FEMUR 1 VIEW LT        EXAM:  XR HIP 2-3V LT            PROCEDURE DATE:  05/12/2021            INTERPRETATION:  Clinical history:Trauma, fracture  Technique: 2 images of the left hip and pelvis and 4 images of the left femur  Comparison: None available    Findings:  Pelvis/left hip  There is a left intertrochanteric fracture with varus angulation. The lesser trochanter is displaced medially. Both hip joints are maintained. Several popcorn calcifications are projecting over the pelvis and compatible with fibroids. A rounded coil structure is projecting over the uterus is well.  A tubular structure is projecting over the mid pelvis.    Left femur  Left intertrochanteric fracture as above. No other fractures are identified.    Impression:    Left intertrochanteric fracture.              VERONIQUE MANNING MD; Attending Radiologist  This document has been electronically signed. May 12 2021  9:52AM 11.2   10.52 )-----------( 193      ( 18 May 2021 08:57 )             33.6     05-18    137  |  99  |  10  ----------------------------<  152<H>  4.1   |  25  |  0.5<L>    Ca    9.1      18 May 2021 08:57  Phos  4.3     05-18  Mg     2.1     05-18    TPro  5.9<L>  /  Alb  3.7  /  TBili  1.7<H>  /  DBili  x   /  AST  25  /  ALT  10  /  AlkPhos  82  05-18        EXAM:  XR FEMUR 1 VIEW LT        EXAM:  XR HIP 2-3V LT            PROCEDURE DATE:  05/12/2021            INTERPRETATION:  Clinical history:Trauma, fracture  Technique: 2 images of the left hip and pelvis and 4 images of the left femur  Comparison: None available    Findings:  Pelvis/left hip  There is a left intertrochanteric fracture with varus angulation. The lesser trochanter is displaced medially. Both hip joints are maintained. Several popcorn calcifications are projecting over the pelvis and compatible with fibroids. A rounded coil structure is projecting over the uterus is well.  A tubular structure is projecting over the mid pelvis.    Left femur  Left intertrochanteric fracture as above. S/p gamma nail.  WBAT LLE.   No other fractures are identified.    Impression:    Left intertrochanteric fracture.              VERONIQUE MANNING MD; Attending Radiologist  This document has been electronically signed. May 12 2021  9:52AM

## 2021-05-18 NOTE — DISCHARGE NOTE PROVIDER - HOSPITAL COURSE
81 yo F with PMH of glaucoma b/l (legally blind) presents to the ED for after a mechanical fall out of her bed around 6:30AM this morning. Pt reports she was trying to reach for something on her dresser and rolled out of bed onto her left side. P reports she was unable to get up on her own due to left hip pain. Prior to this event, pt is fairly healthy, can walk without assistant Pt denies trauma to head, LOC, use of blood thinners, headaches, dizziness, neck pain, back pain, abdominal pain, n/v/d/c, weakness, numbness, tingling, urinary symptoms.     Patient was found to have left intertrochanteric fracture, underwent ORIF, received physical therapy. Patient will be going to [facility] for rehab management.

## 2021-05-18 NOTE — DISCHARGE NOTE PROVIDER - NSDCMRMEDTOKEN_GEN_ALL_CORE_FT
oxyCODONE 10 mg oral tablet: 1 tab(s) orally every 6 hours, As needed, Moderate Pain (4 - 6)  oxyCODONE 5 mg oral tablet: 1 tab(s) orally every 4 hours, As needed, Mild Pain (1 - 3)  polyethylene glycol 3350 oral powder for reconstitution: 17 gram(s) orally once a day (at bedtime)  senna oral tablet: 2 tab(s) orally once a day (at bedtime)

## 2021-05-18 NOTE — H&P ADULT - NSHPREVIEWOFSYSTEMS_GEN_ALL_CORE
REVIEW OF SYSTEMS   Constiutional:    [ x  ] WNL           [   ] poor appetite   [   ] insomnia   [   ] tired   Cardio:                [  x ] WNL           [   ] CP   [   ] LINARES   [   ] palpitations               Resp:                   [ x  ] WNL           [   ] SOB   [   ] cough   [   ] wheezing   GI:                        [ x  ] WNL           [   ] constipation   [   ] diarrhea   [   ] abdominal pain   [   ] nausea   [   ] emesis                                :                      [   ] WNL           [ x  ] VAUGHAN (purewick catheter)  [   ] dysuria   [   ] difficulty voiding             Endo:                   [  x ] WNL          [   ] polyuria   [   ] temperature intolerance                 Skin:                     [ x  ] WNL          [   ] pain   [   ] wound   [   ] rash   MSK:                    [   ] WNL          [   ] muscle pain   [  x ] joint pain/ stiffness   [   ] muscle tenderness   [   ] swelling   Neuro:                 [ x  ] WNL          [   ] HA   [   ] change in vision   [   ] tremor   [   ] weakness   [   ]dysphagia              Cognitive:           [ x  ] WNL           [   ]confusion      Psych:                  [ x  ] WNL           [   ] hallucinations   [   ]agitation   [   ] delusion   [   ]depression

## 2021-05-18 NOTE — H&P ADULT - ASSESSMENT
ASSESSMENT/PLAN    Rehab of left intertrochanteric fracture s/p ORIF    Patient seen and evaluated with Dr. Melendez    # Leftt intertrochanteric fx s/p mechanical fall s/p ORIF 5/14  - Oxycodone q4hrs prn  - WBAT LLE as per ortho  - F/u orthopedics for remove staples  - Wound is c/d/i  - Full code       -Pain control: Tylenol, Oxycodone    -GI/Bowel Mgmt: bowel bed as needed    -Bladder management: purewick catheter     -Skin:  No active issues at this time    -FEN     - Diet:           Precautions / PROPHYLAXIS:      - Falls    - Ortho: Weight bearing status: WBAT      - DVT prophylaxis: Lovenox      MEDICAL PROGNOSIS: GOOD            REHAB POTENTIAL: GOOD             ESTIMATED DISPOSITION: HOME WITH HOME CARE       [ x ]  The goals of the IRF admission were discussed with the patient and or family member, who agreed             ELOS:  [     ] 7-14    [  x  ]  14-21    [    ]  Other    THERAPY ORDERS and INITIAL INDIVIDUALIZED PLAN OF CARE:  This initial individualized interdisciplinary plan of care, which was established by me (the attending physiatrist), is based on elements from the post admission evaluation. The interdisciplinary therapy program is to be at least 3 hrs a day, at least 5 days per week from from physical, occupational and/ or speech therapies as ordered by me below.      [ x  ] P.T. 90 mins. /day at least 5 out of 7 days:  [  x ] superficial  modalities prn, [ x  ] A/AAROM, [ x  ] PREs, [ x  ] transfer training,            [ x  ] progressive ambulation, [x   ] stairs                                               [ x  ] O.T. 90 mins. /day at least 5 out of 7 days::  [ x  ] modalities prn,  [ x  ]A/AAROM, [ x  ] PREs, [  x ] functional transfer training, [ x  ] ADL training           [   ] cognitive/ perceptual eval and training, [   ] splint eval                                                  [   ] S.L.P:  [   ] speech eval, [   ] swallow eval     [   ] Neuropsychology eval     [ x  ] Individualized rec. therapy      RATIONALE FOR INPATIENT ADMISSION - Patient demonstrates the following: (check all that apply)  [X] Medically appropriate for acute rehabilitation admission. Requires interdisiplinary therapy consisting of at least PT and OT, at least 3 hrs. a day at least 5 days a week  [X] Has attainable rehab goals with an appropriate initial discharge plan  [X] Has rehabilitation potential (expected to make a significant improvement within a reasonable period of time)  [X] Requires close medical management by a rehab physician, rehab nursing care,  and comprehensive interdisciplinary team (including PT, OT)    [X] Requires evaluation by a physiatrist at least 3 days a week to evaluate and manage and coordinate rehab and medical problems   ASSESSMENT/PLAN    Rehab of left intertrochanteric hip  fracture, s/p ORIF, IM nauatient seen and evaluated with Dr. Melendez    # Left intertrochanteric hip fx, s/p mechanical fall,  s/p ORIF on 5/14  - Oxycodone q4hrs prn  - WBAT LLE as per ortho  - Wound is c/d/i  - Full code       -Pain control: Tylenol, Oxycodone    -GI/Bowel Mgmt: bowel bed as needed    -Bladder management: purewick catheter     -Skin:  Left thigh incissions. Covered by 2 3 inch by one inch dressings,.  No active issues at this time    -FEN     - Diet: DASH, heart healthy    -Will check 25OH Vit D level.    -Legally blind, history of glaucoma.      Precautions / PROPHYLAXIS:      - Falls    - Ortho: Weight bearing status: WBAT on the LLE     .  - DVT prophylaxis: Lovenox; some left calf tend; warrants venous duplex to R/O DVT LLE      MEDICAL PROGNOSIS: GOOD            REHAB POTENTIAL: GOOD             ESTIMATED DISPOSITION: HOME WITH HOME CARE       [ x ]  The goals of the IRF admission were discussed with the patient and or family member, who agreed             ELOS:  [     ] 7-14    [  x  ]  14-21    [    ]  Other    THERAPY ORDERS and INITIAL INDIVIDUALIZED PLAN OF CARE:  This initial individualized interdisciplinary plan of care, which was established by me (the attending physiatrist), is based on elements from the post admission evaluation. The interdisciplinary therapy program is to be at least 3 hrs a day, at least 5 days per week from from physical, occupational and/ or speech therapies as ordered by me below.      [ x  ] P.T. 90 mins. /day at least 5 out of 7 days:  [  x ] superficial  modalities prn, [ x  ] A/AAROM, [ x  ] PREs, [ x  ] transfer training,            [ x  ] progressive ambulation, [x   ] stairs                                               [ x  ] O.T. 90 mins. /day at least 5 out of 7 days::  [ x  ] modalities prn,  [ x  ]A/AAROM, [ x  ] PREs, [  x ] functional transfer training, [ x  ] ADL training           [   ] cognitive/ perceptual eval and training, [   ] splint eval                                                  [   ] S.L.P:  [   ] speech eval, [   ] swallow eval     [   ] Neuropsychology eval     [ x  ] Individualized rec. therapy      RATIONALE FOR INPATIENT ADMISSION - Patient demonstrates the following: (check all that apply)  [X] Medically appropriate for acute rehabilitation admission. Requires interdisiplinary therapy consisting of at least PT and OT, at least 3 hrs. a day at least 5 days a week  [X] Has attainable rehab goals with an appropriate initial discharge plan  [X] Has rehabilitation potential (expected to make a significant improvement within a reasonable period of time)  [X] Requires close medical management by a rehab physician, rehab nursing care,  and comprehensive interdisciplinary team (including PT, OT)    [X] Requires evaluation by a physiatrist at least 3 days a week to evaluate and manage and coordinate rehab and medical problems   ASSESSMENT/PLAN    Rehab of left intertrochanteric hip  fracture, s/p ORIF, IM nauatient seen and evaluated with Dr. Melendez    # Left intertrochanteric hip fx, s/p mechanical fall,  s/p ORIF on 5/14  - Oxycodone q4hrs prn  - WBAT LLE as per ortho  - Wound is c/d/i.  - Full code       -Pain control: Tylenol, Oxycodone    -GI/Bowel Mgmt: bowel bed as needed    -Bladder management: purewick catheter     -Skin:  Left thigh incissions. Covered by 2 3 inch by one inch dressings,.  No active issues at this time    -FEN     - Diet: DASH, heart healthy    -Will check 25OH Vit D level.    -Legally blind, history of glaucoma.      Precautions / PROPHYLAXIS:      - Falls    - Ortho: Weight bearing status: WBAT on the LLE     .  - DVT prophylaxis: Lovenox; some left calf tend; warrants venous duplex to R/O DVT LLE      MEDICAL PROGNOSIS: GOOD            REHAB POTENTIAL: GOOD             ESTIMATED DISPOSITION: HOME WITH HOME CARE       [ x ]  The goals of the IRF admission were discussed with the patient and or family member, who agreed             ELOS:  [     ] 7-14    [  x  ]  14-21    [    ]  Other    THERAPY ORDERS and INITIAL INDIVIDUALIZED PLAN OF CARE:  This initial individualized interdisciplinary plan of care, which was established by me (the attending physiatrist), is based on elements from the post admission evaluation. The interdisciplinary therapy program is to be at least 3 hrs a day, at least 5 days per week from from physical, occupational and/ or speech therapies as ordered by me below.      [ x  ] P.T. 90 mins. /day at least 5 out of 7 days:  [  x ] superficial  modalities prn, [ x  ] A/AAROM, [ x  ] PREs, [ x  ] transfer training,            [ x  ] progressive ambulation, [x   ] stairs                                               [ x  ] O.T. 90 mins. /day at least 5 out of 7 days::  [ x  ] modalities prn,  [ x  ]A/AAROM, [ x  ] PREs, [  x ] functional transfer training, [ x  ] ADL training           [   ] cognitive/ perceptual eval and training, [   ] splint eval                                                  [   ] S.L.P:  [   ] speech eval, [   ] swallow eval     [   ] Neuropsychology eval     [ x  ] Individualized rec. therapy      RATIONALE FOR INPATIENT ADMISSION - Patient demonstrates the following: (check all that apply)  [X] Medically appropriate for acute rehabilitation admission. Requires interdisiplinary therapy consisting of at least PT and OT, at least 3 hrs. a day at least 5 days a week  [X] Has attainable rehab goals with an appropriate initial discharge plan  [X] Has rehabilitation potential (expected to make a significant improvement within a reasonable period of time)  [X] Requires close medical management by a rehab physician, rehab nursing care,  and comprehensive interdisciplinary team (including PT, OT)    [X] Requires evaluation by a physiatrist at least 3 days a week to evaluate and manage and coordinate rehab and medical problems   ASSESSMENT/PLAN    Rehab of left intertrochanteric hip fracture, s/p ORIF/gamma nail. She was seen and evaluated with Dr. Melendez    # Left intertrochanteric hip fx, s/p mechanical fall,  s/p ORIF on 5/14  - Oxycodone q4hrs prn  - WBAT LLE as per ortho  - Wound is c/d/i.  - Full code       -Pain control: Tylenol, Oxycodone    -GI/Bowel Mgmt: bowel med as needed    -Bladder management: purewick catheter     -Skin:  Left thigh incissions. Covered by two  3 inch by one inch dressings,.  No active issues at this time    -FEN     - Diet: DASH, heart healthy    -Will check 25OH Vit D level.    -Legally blind, history of glaucoma.      Precautions / PROPHYLAXIS:      - Falls    - Ortho: Weight bearing status: WBAT on the LLE     .  - DVT prophylaxis: Lovenox; some left calf tenderness; warrants venous duplex to R/O DVT LLE      MEDICAL PROGNOSIS: GOOD            REHAB POTENTIAL: GOOD             ESTIMATED DISPOSITION: HOME WITH HOME CARE       [ x ]  The goals of the IRF admission were discussed with the patient and or family member, who agreed             ELOS:  [     ] 7-14    [  x  ]  14-21    [    ]  Other    THERAPY ORDERS and INITIAL INDIVIDUALIZED PLAN OF CARE:  This initial individualized interdisciplinary plan of care, which was established by me (the attending physiatrist), is based on elements from the post admission evaluation. The interdisciplinary therapy program is to be at least 3 hrs a day, at least 5 days per week from from physical, occupational and/ or speech therapies as ordered by me below.      [ x  ] P.T. 90 mins. /day at least 5 out of 7 days:  [  x ] superficial  modalities prn, [ x  ] A/AAROM, [ x  ] PREs, [ x  ] transfer training,            [ x  ] progressive ambulation, [x   ] stairs                                               [ x  ] O.T. 90 mins. /day at least 5 out of 7 days::  [ x  ] modalities prn,  [ x  ]A/AAROM, [ x  ] PREs, [  x ] functional transfer training, [ x  ] ADL training           [   ] cognitive/ perceptual eval and training, [   ] splint eval                                                  [   ] S.L.P:  [   ] speech eval, [   ] swallow eval     [   ] Neuropsychology eval     [ x  ] Individualized rec. therapy      RATIONALE FOR INPATIENT ADMISSION - Patient demonstrates the following: (check all that apply)  [X] Medically appropriate for acute rehabilitation admission. Requires interdisiplinary therapy consisting of at least PT and OT, at least 3 hrs. a day at least 5 days a week  [X] Has attainable rehab goals with an appropriate initial discharge plan  [X] Has rehabilitation potential (expected to make a significant improvement within a reasonable period of time)  [X] Requires close medical management by a rehab physician, rehab nursing care,  and comprehensive interdisciplinary team (including PT, OT)    [X] Requires evaluation by a physiatrist at least 3 days a week to evaluate and manage and coordinate rehab and medical problems

## 2021-05-18 NOTE — DISCHARGE NOTE PROVIDER - PROVIDER TOKENS
PROVIDER:[TOKEN:[[id number]],FOLLOWUP:[1 month]],PROVIDER:[TOKEN:[[id number]],FOLLOWUP:[1 week]]

## 2021-05-18 NOTE — PROGRESS NOTE ADULT - NSICDXPILOT_GEN_ALL_CORE
[place]

## 2021-05-18 NOTE — PROGRESS NOTE ADULT - ASSESSMENT
79 yo F with PMH of glaucoma b/l (legally blind) presents to the ED for after a mechanical fall out of her bed around 6:30AM this morning. Found to have a left intertrochanteric fx.     # Lt intertrochanteric fx s/p mechanical fall  - per ortho recommendation:  - nonweightbearing to LLE  - added to OR schedule for Left Hip ORIF for tomorrow  - NPO since midnight and IV fluids  - 2u pRBC on hold for OR  - DVT ppx:  held this AM before surgery), SCDs  - pain control PRN  - *Clearance/risk stratification by medicine attending    # Pseudohyperkalemia (resolved)  - repeat BMP. K wnl    # Elevated BP  - likely secondary to pain  - if BP persistently elevated after procedure, will start amlodipine 5 mg qD     Diet: Regular  GI ppx: N/A  DVT ppx: Lovenox 40 mg qHS (held this AM)  Activity: NWB of LLE  Code status: Full Code   Disposition: from home, requires ortho sx  
79 yo F with PMH of glaucoma b/l (legally blind) presents to the ED for after a mechanical fall out of her bed on morning of presentation. Found to have a left intertrochanteric fx.     # Lt intertrochanteric fx s/p mechanical fall s/p ORIF 5/14  - pain control PRN  - *Clearance/Risk stratification done by medicine and Cardio (Dr. Gonzales)  - PT/rehab, WBAT on LLE  - Seen by physiatry  - Pending placement to [facility] or SNF    # Elevated BP  - if BP persistently elevated,   - continue amlodipine 5 mg qD     #Severe AS  - seen by cardio, no symptoms at this time. Monitor on tele for 24hrs    Diet: Regular  GI ppx: N/A  DVT ppx: Lovenox 40 mg qHS  Activity: NWB of LLE  Code status: Full Code   Disposition: Pending [facility] placement and SNF      
79 yo F with PMH of glaucoma b/l (legally blind) presents to the ED for after a mechanical fall out of her bed on morning of presentation. Found to have a left intertrochanteric fx.     # Lt intertrochanteric fx s/p mechanical fall s/p ORIF 5/14  - pain control PRN  - *Clearance/Risk stratification done by medicine and Cardio (Dr. Gonzales)  - lasix 40mg IV push once today   - PT/rehab, WBAT on LLE    # Elevated BP  - if BP persistently elevated, will start amlodipine 5 mg qD     #Severe AS  - seen by cardio, no symptoms at this time. Monitor on tele for 24hrs    Diet: Regular  GI ppx: N/A  DVT ppx: Lovenox 40 mg qHS  Activity: NWB of LLE  Code status: Full Code   Disposition: rehab planning       
81 yo F with PMH of glaucoma b/l (legally blind) presents to the ED for after a mechanical fall out of her bed on morning of presentation. Found to have a left intertrochanteric fx.     # Lt intertrochanteric fx s/p mechanical fall s/p ORIF   pain control PRN (make both oxy doses q4 prn) + bowel reg  PT/rehab, WBAT on LLE    # Elevated BP - seems transient; OK now    # Severe AS  seen by cardio, no symptoms at this time     # blind 2/2 glaucoma  not on meds here    # GI ppx: N/A    # DVT ppx: Lovenox 40 mg qHS    # Activity: WBAT; PT f/u; [facility] eval    # Code status: Full Code     # COVID ne/12 - need to keep updated for SNF    Disposition: stable for d/c when rehab bed avail on 4[facility] or SNF; tx pain
79 yo F with PMH of glaucoma b/l (legally blind) presents to the ED for after a mechanical fall out of her bed on morning of presentation. Found to have a left intertrochanteric fx.     # Lt intertrochanteric fx s/p mechanical fall s/p ORIF 5/14  - pain control PRN  - *Clearance/Risk stratification done by medicine and Cardio (Dr. Gonzales)  - lasix 40mg IV push once today   - PT/rehab, WBAT on LLE    # Elevated BP  - if BP persistently elevated, will start amlodipine 5 mg qD     #Severe AS  - seen by cardio, no symptoms at this time. Monitor on tele for 24hrs    Diet: Regular  GI ppx: N/A  DVT ppx: Lovenox 40 mg qHS  Activity: NWB of LLE  Code status: Full Code   Disposition: rehab planning   
81 yo F with PMH of glaucoma b/l (legally blind) presents to the ED for after a mechanical fall out of her bed on morning of presentation. Found to have a left intertrochanteric fx.     # Lt intertrochanteric fx s/p mechanical fall  - per ortho recommendation:  - nonweightbearing to LLE  - added to OR schedule for Left Hip ORIF for tomorrow  - NPO since midnight and IV fluids  - 2u pRBC on hold for OR  - DVT ppx:  held this AM before surgery), SCDs  - pain control PRN  - *Clearance/Risk stratification done by medicine and Cardio (Dr. Gonzales)  - Will be transferred to  for 24 hr monitoring post OR  - Needs IV Lasix after OR    # Pseudohyperkalemia (resolved)  - repeat BMP. K wnl    # Elevated BP  - likely secondary to pain  - if BP persistently elevated after procedure, will start amlodipine 5 mg qD     Diet: Regular  GI ppx: N/A  DVT ppx: Lovenox 40 mg qHS (held this AM)  Activity: NWB of LLE  Code status: Full Code   Disposition: Tele    To Do: -   Will be transferred to  for 24 hr monitoring post OR  - Needs IV Lasix after OR  
81 yo F with PMH of glaucoma b/l (legally blind) presents to the ED for after a mechanical fall out of her bed on morning of presentation. Found to have a left intertrochanteric fx.     # Lt intertrochanteric fx s/p mechanical fall s/p ORIF 5/14  - pain control PRN  - *Clearance/Risk stratification done by medicine and Cardio (Dr. Gonzales)  - lasix 40mg IV push once today   - PT/rehab, WBAT on LLE    # Elevated BP  - if BP persistently elevated, will start amlodipine 5 mg qD     #Severe AS  - seen by cardio, no symptoms at this time. D/C'd  Tele yesterday.      Diet: Regular  GI ppx: N/A  DVT ppx: Lovenox 40 mg qHS  Activity: NWB of LLE  Code status: Full Code   Disposition: rehab planning     #Progress Note Handoff  Pending (specify): PT   Disposition: STR 
79 yo F with PMH of glaucoma b/l (legally blind) presents to the ED for after a mechanical fall out of her bed on morning of presentation. Found to have a left intertrochanteric fx.     # Lt intertrochanteric fx s/p mechanical fall s/p ORIF 5/14  - pain control PRN  - *Clearance/Risk stratification done by medicine and Cardio (Dr. Gonzales)  - lasix 40mg IV push once today   - PT/rehab, WBAT on LLE    # Elevated BP  - if BP persistently elevated, will start amlodipine 5 mg qD     #Severe AS  - seen by cardio, no symptoms at this time. D/C Tele today  if remains uneventful.     Diet: Regular  GI ppx: N/A  DVT ppx: Lovenox 40 mg qHS  Activity: NWB of LLE  Code status: Full Code   Disposition: rehab planning     #Progress Note Handoff  Pending (specify): PT   Disposition: STR 
81 yo F with PMH of glaucoma b/l (legally blind) presents to the ED for after a mechanical fall out of her bed on morning of presentation. Found to have a left intertrochanteric fx.     # Lt intertrochanteric fx s/p mechanical fall s/p ORIF 5/14  - pain control PRN  - *Clearance/Risk stratification done by medicine and Cardio (Dr. Gonzales)  - PT/rehab, WBAT on LLE  - Seen by physiatry  - Pending placement to 4[facility] or SNF    # Elevated BP  - if BP persistently elevated,   - continue amlodipine 5 mg qD     #Severe AS  - seen by cardio, no symptoms at this time. Monitor on tele for 24hrs    Diet: Regular  GI ppx: N/A  DVT ppx: Lovenox 40 mg qHS  Activity: NWB of LLE  Code status: Full Code   Disposition: Pending 4[facility] placement and SNF      
79 yo F with PMH of glaucoma b/l (legally blind) presents to the ED for after a mechanical fall out of her bed on morning of presentation. Found to have a left intertrochanteric fx.     # Lt intertrochanteric fx s/p mechanical fall s/p ORIF   pain control PRN (make both oxy doses q4 prn) + bowel reg  PT/rehab, WBAT on LLE  ortho f/u in couple weeks or so to d/c staples    # Elevated BP - seems transient; OK now    # Severe AS  seen by cardio, no symptoms at this time     # blind 2/2 glaucoma  not on meds here    # GI ppx: N/A    # DVT ppx: Lovenox 40 mg qHS    # Activity: WBAT; PT f/u; [facility] eval    # Code status: Full Code     # Contact  updated pt's dtr via the phone; told her pt was accepted to [facility] and should be there this afternoon; she had no questions    # COVID ne/12;  - need to keep updated for SNF  strongly + Sp Abs = likely immune    Disposition: stable for d/c when rehab bed avail on 4[facility]; tx pain  outpt cardio f/u for AS

## 2021-05-18 NOTE — H&P ADULT - NSHPSOCIALHISTORY_GEN_ALL_CORE
Patient reports she smoke for 8 years and quit 50 years ago, occasionally drinks wine, but not often.    Reports that she lives with her daughter in a condo with elevator acesss.  Reports being independent with ambulation and ADL's. Uses white cane inside and outside of the house as she is completely blind from both eyes Patient reports she smoke for 8 years and quit 50 years ago, occasionally drinks wine, but not often.    Reports that she lives with her daughter in a condo with elevator acesss.  Reports being independent with ambulation and ADL's. Uses walking stick inside and outside of the house as she is completely blind from both eyes Patient reports she smoked for 8 years and quit 50 years ago and occasionally drinks wine, but not often.    Reports that she lives with her daughter in a condo with elevator acesss.  Reports being independent with ambulation and ADL's. Uses walking stick inside and outside of the house as she is completely blind from both eyes Patient reports she smoked for 8 years and quit 50 years ago and occasionally drinks wine, but not often.    Reports that she lives with her daughter in a condo with elevator acesss.  Reports being independent with ambulation and ADL's. Uses walking stick inside and outside of the house as she is completely blind in both eye.s

## 2021-05-18 NOTE — DISCHARGE NOTE NURSING/CASE MANAGEMENT/SOCIAL WORK - PATIENT PORTAL LINK FT
You can access the FollowMyHealth Patient Portal offered by [facility] by registering at the following website: [email or web address]. By joining [facility]’s FollowMyHealth portal, you will also be able to view your health information using other applications (apps) compatible with our system.

## 2021-05-18 NOTE — H&P ADULT - ATTENDING COMMENTS
Patient seen, examined with the resident. We discussed the case. I have directed the care. I have edited the note and added to the plan. She is legally blind and has suffered a left hip intertroch fracture. She is s/p ORIF with a gamma nail. She is WBAT on the LLE. She warrants admission to the inpatient rehab unit with 3 hours of daily therapies.,

## 2021-05-18 NOTE — PROGRESS NOTE ADULT - SUBJECTIVE AND OBJECTIVE BOX
MANDO DELA CRUZ 80y Female  MRN#: [id number]   CODE STATUS:________      SUBJECTIVE  Patient is a 80y old Female who presents with a chief complaint of s/p Mechanical fall (17 May 2021 17:10)  Currently admitted to medicine with the primary diagnosis of Hip fracture, left      Today is hospital day 6d, and this morning she is           OBJECTIVE  PAST MEDICAL & SURGICAL HISTORY    ALLERGIES:  latex (Pruritus)  Nickel based metals (Other)  No Known Drug Allergies    MEDICATIONS:  STANDING MEDICATIONS  enoxaparin Injectable 40 milliGRAM(s) SubCutaneous at bedtime  polyethylene glycol 3350 17 Gram(s) Oral at bedtime  senna 2 Tablet(s) Oral at bedtime    PRN MEDICATIONS  oxyCODONE    IR 5 milliGRAM(s) Oral every 4 hours PRN  oxyCODONE    IR 10 milliGRAM(s) Oral every 6 hours PRN      VITAL SIGNS: Last 24 Hours  T(C): 37.6 (18 May 2021 05:15), Max: 37.7 (17 May 2021 19:47)  T(F): 99.7 (18 May 2021 05:15), Max: 99.8 (17 May 2021 19:47)  HR: 78 (18 May 2021 05:15) (78 - 101)  BP: 141/72 (18 May 2021 05:15) (121/59 - 141/72)  BP(mean): --  RR: 20 (18 May 2021 05:15) (18 - 20)  SpO2: --    LABS:                        10.1   10.14 )-----------( 151      ( 17 May 2021 05:54 )             31.1     05-17    142  |  103  |  15  ----------------------------<  93  4.3   |  26  |  0.5<L>    Ca    9.1      17 May 2021 05:54  Phos  3.6     05-17  Mg     2.0     05-17    TPro  5.4<L>  /  Alb  3.3<L>  /  TBili  1.3<H>  /  DBili  x   /  AST  23  /  ALT  9   /  AlkPhos  64  05-17                  RADIOLOGY:      PHYSICAL EXAM:    GENERAL: NAD, well-developed, AAOx3  HEENT:  Atraumatic, Normocephalic. EOMI, PERRLA, conjunctiva and sclera clear, No JVD  PULMONARY: Clear to auscultation bilaterally; No wheeze  CARDIOVASCULAR: Regular rate and rhythm; No murmurs, rubs, or gallops  GASTROINTESTINAL: Soft, Nontender, Nondistended; Bowel sounds present  MUSCULOSKELETAL:  2+ Peripheral Pulses, No clubbing, cyanosis, or edema  NEUROLOGY: non-focal  SKIN: No rashes or lesions

## 2021-05-18 NOTE — PROGRESS NOTE ADULT - SUBJECTIVE AND OBJECTIVE BOX
MANDO DELA CRUZ  80y  Female  ***My note supersedes ALL resident notes that I sign.  My corrections for their notes are in my note.***    I can be reached directly on [employer] [id number]. My office number is [phone]. My personal cell number is [phone].    INTERVAL EVENTS: Here for f/u of hip fx. Pt doing well. Pain controlled w/ oral meds. No complaints.    T(F): 99.7 (05-18-21 @ 05:15), Max: 99.8 (05-17-21 @ 19:47)  HR: 78 (05-18-21 @ 05:15) (78 - 101)  BP: 141/72 (05-18-21 @ 05:15) (121/59 - 141/72)  RR: 20 (05-18-21 @ 05:15) (18 - 20)  SpO2: --    Gen: NAD  HEENT: PERRL, EOMI, mouth clr, nose clr  Neck: no nodes, no JVD, thyroid nl  lungs: clr  hrt: s1 s2 rrr 3/6 sys murmur  abd: soft, NT/ND, no HS megaly  ext: no edema, no c/c; surg site on left leg looks fine; mild bruising noted (expected) - no bleeding  neuro: aa ox3, cn intact, can move all 4 ext    LABS:                      11.2    (    93.9   10.52 )-----------( ---------      193      ( 18 May 2021 08:57 )             33.6    (    12.7     137   (   99   (   152      05-18-21 @ 08:57  ----------------------               4.1   (   25   (   10                             -----                        0.5  Ca  9.1   Mg  2.1    P   4.3     LFT  5.9  (  1.7  (  25       05-18-21 @ 08:57  -------------------------  3.7  (  82  (  10    RADIOLOGY & ADDITIONAL TESTS:    MEDICATIONS:    enoxaparin Injectable 40 milliGRAM(s) SubCutaneous at bedtime  oxyCODONE    IR 5 milliGRAM(s) Oral every 4 hours PRN  oxyCODONE    IR 10 milliGRAM(s) Oral every 6 hours PRN  polyethylene glycol 3350 17 Gram(s) Oral at bedtime  senna 2 Tablet(s) Oral at bedtime

## 2021-05-18 NOTE — PROGRESS NOTE ADULT - REASON FOR ADMISSION
s/p Mechanical fall

## 2021-05-18 NOTE — H&P ADULT - HISTORY OF PRESENT ILLNESS
79 yo F with PMH of glaucoma b/l (legally blind) presents to the ED for after a mechanical fall out of her bed around 6:30AM this morning. Pt reports she was trying to reach for something on her dresser and rolled out of bed onto her left side. P reports she was unable to get up on her own due to left hip pain. Prior to this event, pt is fairly healthy, can walk without assistant. Pt denies trauma to head, LOC, use of blood thinners, headaches, dizziness, neck pain, back pain, abdominal pain, n/v/d/c, weakness, numbness, tingling, urinary symptoms. Imaging consistent with left trochanteric fracture. Patient underwent ORIF on 5/14/21 with Dr. Hirsch.     Patient seen and evaluated by PMR consultant and found to be an appropriate candidate for acute inpatient rehab.  Latest progress with PT: Patient ambulated 4 steps x 2 with mod assist, transfers mod assist, bed mobility max assist     79 yo F with PMH of glaucoma b/l (legally blind) presents to the ED for after a mechanical fall out of her bed around 6:30AM this morning. Pt reports she was trying to reach for something on her dresser and rolled out of bed onto her left side. P reports she was unable to get up on her own due to left hip pain. Prior to this event, pt is fairly healthy, can walk without assistant. Pt denies trauma to head, LOC, use of blood thinners, headaches, dizziness, neck pain, back pain, abdominal pain, n/v/d/c, weakness, numbness, tingling, urinary symptoms. Imaging consistent with left trochanteric fracture. Patient underwent ORIF with  a gamma nail on 5/14/21 with Dr. Hirsch. She was cleared for WBAT on the LLE.    Patient seen and evaluated by PMR consultant and found to be an appropriate candidate for acute inpatient rehab.  Latest progress with PT: Patient ambulated 4 steps x 2 with mod assist, transfers mod assist, bed mobility max assist

## 2021-05-18 NOTE — DISCHARGE NOTE PROVIDER - CARE PROVIDER_API CALL
Donnie Herman  CARDIOVASCULAR DISEASE  [address] [facility] [address]  [place] [address]  Phone: [phone]  Fax: [phone]  Follow Up Time: 1 month    Meng Hirsch  ORTHOPAEDIC SURGERY  [address] [place] [id number]  Phone: [phone]  Fax: [phone]  Follow Up Time: 1 week

## 2021-05-18 NOTE — DISCHARGE NOTE PROVIDER - NSDCCPCAREPLAN_GEN_ALL_CORE_FT
PRINCIPAL DISCHARGE DIAGNOSIS  Diagnosis: Hip fracture, left  Assessment and Plan of Treatment: You presented with a left hip fracture and was fixed by orthopedic surgery. We recommend rehabilitation to get you back up to function with the hip.   You will need to follow up with orthopedic surgery for staple removal in 2 weeks.      SECONDARY DISCHARGE DIAGNOSES  Diagnosis: Aortic stenosis  Assessment and Plan of Treatment: Follow up with cardiology o/p

## 2021-05-19 LAB
24R-OH-CALCIDIOL SERPL-MCNC: 67 NG/ML — SIGNIFICANT CHANGE UP (ref 30–80)
ALBUMIN SERPL ELPH-MCNC: 3.5 G/DL — SIGNIFICANT CHANGE UP (ref 3.5–5.2)
ALP SERPL-CCNC: 81 U/L — SIGNIFICANT CHANGE UP (ref 30–115)
ALT FLD-CCNC: 8 U/L — SIGNIFICANT CHANGE UP (ref 0–41)
ANION GAP SERPL CALC-SCNC: 13 MMOL/L — SIGNIFICANT CHANGE UP (ref 7–14)
AST SERPL-CCNC: 22 U/L — SIGNIFICANT CHANGE UP (ref 0–41)
BASOPHILS # BLD AUTO: 0.05 K/UL — SIGNIFICANT CHANGE UP (ref 0–0.2)
BASOPHILS NFR BLD AUTO: 0.5 % — SIGNIFICANT CHANGE UP (ref 0–1)
BILIRUB DIRECT SERPL-MCNC: 0.3 MG/DL — HIGH (ref 0–0.2)
BILIRUB INDIRECT FLD-MCNC: 1.2 MG/DL — SIGNIFICANT CHANGE UP (ref 0.2–1.2)
BILIRUB SERPL-MCNC: 1.5 MG/DL — HIGH (ref 0.2–1.2)
BUN SERPL-MCNC: 11 MG/DL — SIGNIFICANT CHANGE UP (ref 10–20)
CALCIUM SERPL-MCNC: 9.3 MG/DL — SIGNIFICANT CHANGE UP (ref 8.5–10.1)
CHLORIDE SERPL-SCNC: 100 MMOL/L — SIGNIFICANT CHANGE UP (ref 98–110)
CO2 SERPL-SCNC: 26 MMOL/L — SIGNIFICANT CHANGE UP (ref 17–32)
COVID-19 SPIKE DOMAIN AB INTERP: POSITIVE
COVID-19 SPIKE DOMAIN ANTIBODY RESULT: >250 U/ML — HIGH
CREAT SERPL-MCNC: 0.5 MG/DL — LOW (ref 0.7–1.5)
EOSINOPHIL # BLD AUTO: 0.19 K/UL — SIGNIFICANT CHANGE UP (ref 0–0.7)
EOSINOPHIL NFR BLD AUTO: 1.8 % — SIGNIFICANT CHANGE UP (ref 0–8)
GLUCOSE SERPL-MCNC: 107 MG/DL — HIGH (ref 70–99)
HCT VFR BLD CALC: 34.1 % — LOW (ref 37–47)
HGB BLD-MCNC: 11 G/DL — LOW (ref 12–16)
IMM GRANULOCYTES NFR BLD AUTO: 0.9 % — HIGH (ref 0.1–0.3)
LYMPHOCYTES # BLD AUTO: 1.89 K/UL — SIGNIFICANT CHANGE UP (ref 1.2–3.4)
LYMPHOCYTES # BLD AUTO: 18 % — LOW (ref 20.5–51.1)
MAGNESIUM SERPL-MCNC: 2.2 MG/DL — SIGNIFICANT CHANGE UP (ref 1.8–2.4)
MCHC RBC-ENTMCNC: 30.3 PG — SIGNIFICANT CHANGE UP (ref 27–31)
MCHC RBC-ENTMCNC: 32.3 G/DL — SIGNIFICANT CHANGE UP (ref 32–37)
MCV RBC AUTO: 93.9 FL — SIGNIFICANT CHANGE UP (ref 81–99)
MONOCYTES # BLD AUTO: 1.51 K/UL — HIGH (ref 0.1–0.6)
MONOCYTES NFR BLD AUTO: 14.4 % — HIGH (ref 1.7–9.3)
NEUTROPHILS # BLD AUTO: 6.79 K/UL — HIGH (ref 1.4–6.5)
NEUTROPHILS NFR BLD AUTO: 64.4 % — SIGNIFICANT CHANGE UP (ref 42.2–75.2)
NRBC # BLD: 0 /100 WBCS — SIGNIFICANT CHANGE UP (ref 0–0)
PLATELET # BLD AUTO: 218 K/UL — SIGNIFICANT CHANGE UP (ref 130–400)
POTASSIUM SERPL-MCNC: 5.1 MMOL/L — HIGH (ref 3.5–5)
POTASSIUM SERPL-SCNC: 5.1 MMOL/L — HIGH (ref 3.5–5)
PROT SERPL-MCNC: 5.8 G/DL — LOW (ref 6–8)
RBC # BLD: 3.63 M/UL — LOW (ref 4.2–5.4)
RBC # FLD: 13 % — SIGNIFICANT CHANGE UP (ref 11.5–14.5)
SARS-COV-2 IGG+IGM SERPL QL IA: >250 U/ML — HIGH
SARS-COV-2 IGG+IGM SERPL QL IA: POSITIVE
SODIUM SERPL-SCNC: 139 MMOL/L — SIGNIFICANT CHANGE UP (ref 135–146)
TSH SERPL-MCNC: 1.07 UIU/ML — SIGNIFICANT CHANGE UP (ref 0.27–4.2)
WBC # BLD: 10.52 K/UL — SIGNIFICANT CHANGE UP (ref 4.8–10.8)
WBC # FLD AUTO: 10.52 K/UL — SIGNIFICANT CHANGE UP (ref 4.8–10.8)

## 2021-05-19 RX ORDER — OXYCODONE HYDROCHLORIDE 5 MG/1
5 TABLET ORAL
Refills: 0 | Status: DISCONTINUED | OUTPATIENT
Start: 2021-05-19 | End: 2021-05-26

## 2021-05-19 RX ADMIN — OXYCODONE HYDROCHLORIDE 5 MILLIGRAM(S): 5 TABLET ORAL at 06:59

## 2021-05-19 RX ADMIN — ENOXAPARIN SODIUM 40 MILLIGRAM(S): 100 INJECTION SUBCUTANEOUS at 22:43

## 2021-05-19 RX ADMIN — SENNA PLUS 2 TABLET(S): 8.6 TABLET ORAL at 22:43

## 2021-05-19 RX ADMIN — OXYCODONE HYDROCHLORIDE 5 MILLIGRAM(S): 5 TABLET ORAL at 13:11

## 2021-05-19 RX ADMIN — OXYCODONE HYDROCHLORIDE 5 MILLIGRAM(S): 5 TABLET ORAL at 06:12

## 2021-05-19 RX ADMIN — OXYCODONE HYDROCHLORIDE 10 MILLIGRAM(S): 5 TABLET ORAL at 23:15

## 2021-05-19 RX ADMIN — POLYETHYLENE GLYCOL 3350 17 GRAM(S): 17 POWDER, FOR SOLUTION ORAL at 22:45

## 2021-05-19 RX ADMIN — OXYCODONE HYDROCHLORIDE 5 MILLIGRAM(S): 5 TABLET ORAL at 12:01

## 2021-05-19 RX ADMIN — OXYCODONE HYDROCHLORIDE 10 MILLIGRAM(S): 5 TABLET ORAL at 22:44

## 2021-05-19 NOTE — PROGRESS NOTE ADULT - ASSESSMENT
Rehab of left intertrochanteric hip fracture, s/p ORIF/gamma nail.     # Left intertrochanteric hip fx, s/p mechanical fall,  s/p ORIF on 5/14  - Oxycodone q4hrs prn  - WBAT LLE as per ortho  - Wound is c/d/i.  - Full code       -Pain control:  Oxycodone - increase to standing with breakfast and supper    -GI/Bowel Mgmt: bowel med as needed    -Bladder management: purewick catheter     -Skin:  Left thigh incisions. Covered by two  3 inch by one inch dressings,.  No active issues at this time    -FEN     - Diet: DASH, heart healthy    -Will check 25OH Vit D level.    -Legally blind, history of glaucoma.    - Anemia  mild, monitor    Precautions / PROPHYLAXIS:      - Falls    - Ortho: Weight bearing status: WBAT on the LLE     .  - DVT prophylaxis: Lovenox; some left calf tenderness; warrants venous duplex to R/O DVT LLE

## 2021-05-19 NOTE — PROGRESS NOTE ADULT - SUBJECTIVE AND OBJECTIVE BOX
Patient is a 80y old  Female who presents with a chief complaint of Left trochanteric fracture s/p ORIF (18 May 2021 15:33)      HPI:  79 yo F with PMH of glaucoma b/l (legally blind) presents to the ED for after a mechanical fall out of her bed around 6:30AM this morning. Pt reports she was trying to reach for something on her dresser and rolled out of bed onto her left side. P reports she was unable to get up on her own due to left hip pain. Prior to this event, pt is fairly healthy, can walk without assistant. Pt denies trauma to head, LOC, use of blood thinners, headaches, dizziness, neck pain, back pain, abdominal pain, n/v/d/c, weakness, numbness, tingling, urinary symptoms. Imaging consistent with left trochanteric fracture. Patient underwent ORIF with  a gamma nail on 5/14/21 with Dr. Hirsch. She was cleared for WBAT on the LLE.    Patient seen and evaluated by PMR consultant and found to be an appropriate candidate for acute inpatient rehab.  Latest progress with PT: Patient ambulated 4 steps x 2 with mod assist, transfers mod assist, bed mobility max assist     (18 May 2021 15:33)      I examined the patient and reviewed the chart. There have been no significant changes since my history and physical except where documented below.  Full rehab functional eval in progress.    TODAY'S SUBJECTIVE & REVIEW OF SYMPTOMS: No complaints overnight. Appears comfortable sitting in chair. Pain 7/10, but well controlled on meds.         Constiutional:    [ x  ] WNL           [   ] poor appetite   [   ] insomnia   [   ] tired   Cardio:                [ x  ] WNL           [   ] CP   [   ] LINARES   [   ] palpitations               Resp:                   [x   ] WNL           [   ] SOB   [   ] cough   [   ] wheezing   GI:                        [x   ] WNL           [   ] constipation   [   ] diarrhea   [   ] abdominal pain   [   ] nausea   [   ] emesis                                :                      [  x ] WNL           [   ] VAUGHAN  [   ] dysuria   [   ] difficulty voiding             Endo:                   [ x  ] WNL          [   ] polyuria   [   ] temperature intolerance                 Skin:                     [ x  ] WNL          [   ] pain   [   ] wound   [   ] rash   MSK:                    [   ] WNL          [   ] muscle pain   [ x  ] joint pain/ stiffness - left hip   [ x  ] muscle tenderness   [   ] swelling   Neuro:                 [   ] WNL          [   ] HA   [   ] change in vision   [   ] tremor   [   ] weakness   [   ]dysphagia              Cognitive:           [ x  ] WNL           [   ]confusion      Psych:                  [ x  ] WNL           [   ] hallucinations   [   ]agitation   [   ] delusion   [   ]depression      PHYSICAL EXAM    Vital Signs Last 24 Hrs  T(C): 36.2 (19 May 2021 13:02), Max: 36.6 (18 May 2021 21:27)  T(F): 97.1 (19 May 2021 13:02), Max: 97.9 (18 May 2021 21:27)  HR: 91 (19 May 2021 13:02) (83 - 91)  BP: 126/64 (19 May 2021 13:02) (111/57 - 126/64)  BP(mean): --  RR: 20 (19 May 2021 13:02) (18 - 20)  SpO2: --    Constitutional - [ x  ] NAD, Comfortable        [   ] other:  Chest - [ x   ] CTA     [   ] other:  Cardiovascular - [  x ] RRR, no murmer     [   ] other:  Abdomen - [ x  ] Soft, NT/ND      [   ] other:        -  VAUGHAN CATHETER   [   ] YES  if yes: [   ] NO MEATAL TEAR OR DISCHARGE [   ] other:  Extremities - [ x ] No C/C/E, No calf tenderness       [   ] other:  ROM - [x   ] WFL     [   ] other:  Neurologic Exam -                 Cognitive - [x   ]Awake, Alert, AAO to self, place, date, year, situation         [    ] other:      Communication - [   ]Fluent, No dysarthria       [   ] other:      Motor - No focal deficits                    Right UE -  [ x  ] WNL      [    ] other:                    Left UE -     [ x  ] WNL      [    ] other:                    Right LE -   [x   ] WNL       [    ] other:                    Left LE -      [   ]WNL        [  x  ] other: - hip not testable secondary to pain      Sensory - [   ] Intact to LT      [    ] other:          Reflexes - [ x  ] wnl/ symmetric     [   ] other:     Psychiatric - [x   ]Mood stable, Affect WNL     [   ]other:     Skin - [ x  ] intact      [   ] other      acetaminophen   Tablet .. 650 milliGRAM(s) Oral every 6 hours PRN  enoxaparin Injectable 40 milliGRAM(s) SubCutaneous at bedtime  oxyCODONE    IR 5 milliGRAM(s) Oral every 4 hours PRN  oxyCODONE    IR 10 milliGRAM(s) Oral every 6 hours PRN  oxyCODONE    IR 5 milliGRAM(s) Oral <User Schedule>  polyethylene glycol 3350 17 Gram(s) Oral at bedtime  senna 2 Tablet(s) Oral at bedtime      RECENT LABS/IMAGING                        11.0   10.52 )-----------( 218      ( 19 May 2021 06:46 )             34.1     05-19    139  |  100  |  11  ----------------------------<  107<H>  5.1<H>   |  26  |  0.5<L>    Ca    9.3      19 May 2021 06:46  Phos  4.3     05-18  Mg     2.2     05-19    TPro  5.8<L>  /  Alb  3.5  /  TBili  1.5<H>  /  DBili  0.3<H>  /  AST  22  /  ALT  8   /  AlkPhos  81  05-19

## 2021-05-20 RX ADMIN — SENNA PLUS 2 TABLET(S): 8.6 TABLET ORAL at 21:32

## 2021-05-20 RX ADMIN — OXYCODONE HYDROCHLORIDE 5 MILLIGRAM(S): 5 TABLET ORAL at 13:11

## 2021-05-20 RX ADMIN — ENOXAPARIN SODIUM 40 MILLIGRAM(S): 100 INJECTION SUBCUTANEOUS at 21:32

## 2021-05-20 RX ADMIN — OXYCODONE HYDROCHLORIDE 5 MILLIGRAM(S): 5 TABLET ORAL at 11:59

## 2021-05-20 RX ADMIN — OXYCODONE HYDROCHLORIDE 10 MILLIGRAM(S): 5 TABLET ORAL at 07:53

## 2021-05-20 RX ADMIN — POLYETHYLENE GLYCOL 3350 17 GRAM(S): 17 POWDER, FOR SOLUTION ORAL at 21:32

## 2021-05-20 RX ADMIN — OXYCODONE HYDROCHLORIDE 10 MILLIGRAM(S): 5 TABLET ORAL at 08:57

## 2021-05-20 NOTE — PROGRESS NOTE ADULT - ASSESSMENT
Rehab of left intertrochanteric hip fracture, s/p ORIF/gamma nail.     # Left intertrochanteric hip fx, s/p mechanical fall,  s/p ORIF on 5/14  - Oxycodone q4hrs prn  - WBAT LLE as per ortho  - Wound is c/d/i.  - Full code       -Pain control:  Oxycodone - increase to standing with breakfast and supper    -GI/Bowel Mgmt: bowel med as needed    -Bladder management: purewick catheter     -Skin:  Left thigh incisions. Covered by two  3 inch by one inch dressings,.  No active issues at this time    -FEN     - Diet: DASH, heart healthy    -Will check 25OH Vit D level.    -Legally blind, history of glaucoma.    - Anemia  mild, monitor    Precautions / PROPHYLAXIS:      - Falls    - Ortho: Weight bearing status: WBAT on the LLE     .  - DVT prophylaxis: Lovenox; some left calf tenderness; warrants venous duplex to R/O DVT LLE Rehab of left intertrochanteric hip fracture, s/p ORIF/gamma nail.     # Left intertrochanteric hip fx, s/p mechanical fall,  s/p ORIF on 5/14  - Oxycodone q4hrs prn  - WBAT LLE as per ortho  - Wound is c/d/i.  - Full code       -Pain control: Fair control. Oxycodone - increase to standing with breakfast and supper    -GI/Bowel Mgmt: bowel med as needed    -Bladder management: purewick catheter     -Skin:  Left thigh incisions. Covered by two  3 inch by one inch dressings,.  No active issues at this time    -FEN     - Diet: DASH, heart healthy    -Will check 25OH Vit D level.    -Legally blind, history of glaucoma.    - Anemia  mild, monitor    Precautions / PROPHYLAXIS:      - Falls    - Ortho: Weight bearing status: WBAT on the LLE     .  - DVT prophylaxis: Lovenox; some left calf tenderness; warrants venous duplex to R/O DVT LLE

## 2021-05-20 NOTE — PROGRESS NOTE ADULT - SUBJECTIVE AND OBJECTIVE BOX
Patient is a 80y old  Female who presents with a chief complaint of Left trochanteric fracture s/p ORIF (18 May 2021 15:33)      HPI:  79 yo F with PMH of glaucoma b/l (legally blind) presents to the ED for after a mechanical fall out of her bed around 6:30AM this morning. Pt reports she was trying to reach for something on her dresser and rolled out of bed onto her left side. P reports she was unable to get up on her own due to left hip pain. Prior to this event, pt is fairly healthy, can walk without assistant. Pt denies trauma to head, LOC, use of blood thinners, headaches, dizziness, neck pain, back pain, abdominal pain, n/v/d/c, weakness, numbness, tingling, urinary symptoms. Imaging consistent with left trochanteric fracture. Patient underwent ORIF with  a gamma nail on 5/14/21 with Dr. Hirsch. She was cleared for WBAT on the LLE.    Patient seen and evaluated by PMR consultant and found to be an appropriate candidate for acute inpatient rehab.     (18 May 2021 15:33)      TODAY'S SUBJECTIVE & REVIEW OF SYMPTOMS:   Patient seen and evaluated with Dr. Ott. No complaints at this time. No acute events overnight. Doppler of LLE negative.  CLOF: Patient ambulated 4 steps x 2 with mod assist, transfers mod assist, bed mobility max assist     Constiutional:    [ x  ] WNL           [   ] poor appetite   [   ] insomnia   [   ] tired   Cardio:                [ x  ] WNL           [   ] CP   [   ] LINARES   [   ] palpitations               Resp:                   [x   ] WNL           [   ] SOB   [   ] cough   [   ] wheezing   GI:                        [x   ] WNL           [   ] constipation   [   ] diarrhea   [   ] abdominal pain   [   ] nausea   [   ] emesis                                :                      [  x ] WNL           [   ] VAUGHAN  [   ] dysuria   [   ] difficulty voiding             Endo:                   [ x  ] WNL          [   ] polyuria   [   ] temperature intolerance                 Skin:                     [ x  ] WNL          [   ] pain   [   ] wound   [   ] rash   MSK:                    [   ] WNL          [   ] muscle pain   [ x  ] joint pain/ stiffness - left hip   [ x  ] muscle tenderness   [   ] swelling   Neuro:                 [  x ] WNL          [   ] HA   [   ] change in vision   [   ] tremor   [   ] weakness   [   ]dysphagia              Cognitive:           [ x  ] WNL           [   ]confusion      Psych:                  [ x  ] WNL           [   ] hallucinations   [   ]agitation   [   ] delusion   [   ]depression      PHYSICAL EXAM    ICU Vital Signs Last 24 Hrs  T(C): 36.9 (20 May 2021 05:30), Max: 36.9 (20 May 2021 05:30)  T(F): 98.4 (20 May 2021 05:30), Max: 98.4 (20 May 2021 05:30)  HR: 72 (20 May 2021 05:30) (72 - 91)  BP: 136/63 (20 May 2021 05:30) (119/63 - 136/63)  RR: 18 (20 May 2021 05:30) (18 - 20)        Constitutional - [ x  ] NAD, Comfortable        [   ] other:  Chest - [ x   ] CTA     [   ] other:  Cardiovascular - [  x ] RRR, no murmer     [   ] other:  Abdomen - [ x  ] Soft, NT/ND      [   ] other:        -  VAUGHAN CATHETER   [   ] YES  if yes: [   ] NO MEATAL TEAR OR DISCHARGE [   ] other:  Extremities - [ x ] No C/C/E, No calf tenderness       [   ] other:  ROM - [x   ] WFL     [   ] other:  Neurologic Exam -                 Cognitive - [x   ]Awake, Alert, AAO to self, place, date, year, situation         [    ] other:      Communication - [   ]Fluent, No dysarthria       [   ] other:      Motor - No focal deficits                    Right UE -  [ x  ] WNL      [    ] other:                    Left UE -     [ x  ] WNL      [    ] other:                    Right LE -   [x   ] WNL       [    ] other:                    Left LE -      [   ]WNL        [  x  ] other: - hip not testable secondary to pain      Sensory - [   ] Intact to LT      [    ] other:          Reflexes - [ x  ] wnl/ symmetric     [   ] other:     Psychiatric - [x   ]Mood stable, Affect WNL     [   ]other:     Skin - [ x  ] intact      [   ] other      acetaminophen   Tablet .. 650 milliGRAM(s) Oral every 6 hours PRN  enoxaparin Injectable 40 milliGRAM(s) SubCutaneous at bedtime  oxyCODONE    IR 5 milliGRAM(s) Oral every 4 hours PRN  oxyCODONE    IR 10 milliGRAM(s) Oral every 6 hours PRN  oxyCODONE    IR 5 milliGRAM(s) Oral <User Schedule>  polyethylene glycol 3350 17 Gram(s) Oral at bedtime  senna 2 Tablet(s) Oral at bedtime      RECENT LABS/IMAGING                        11.0   10.52 )-----------( 218      ( 19 May 2021 06:46 )             34.1     05-19    139  |  100  |  11  ----------------------------<  107<H>  5.1<H>   |  26  |  0.5<L>    Ca    9.3      19 May 2021 06:46  Phos  4.3     05-18  Mg     2.2     05-19    TPro  5.8<L>  /  Alb  3.5  /  TBili  1.5<H>  /  DBili  0.3<H>  /  AST  22  /  ALT  8   /  AlkPhos  81  05-19         Patient is a 80y old  Female who presents with a chief complaint of Left trochanteric fracture s/p ORIF (18 May 2021 15:33)      HPI:  81 yo F with PMH of glaucoma b/l (legally blind) presents to the ED for after a mechanical fall out of her bed around 6:30AM this morning. Pt reports she was trying to reach for something on her dresser and rolled out of bed onto her left side. P reports she was unable to get up on her own due to left hip pain. Prior to this event, pt is fairly healthy, can walk without assistant. Pt denies trauma to head, LOC, use of blood thinners, headaches, dizziness, neck pain, back pain, abdominal pain, n/v/d/c, weakness, numbness, tingling, urinary symptoms. Imaging consistent with left trochanteric fracture. Patient underwent ORIF with  a gamma nail on 5/14/21 with Dr. Hirsch. She was cleared for WBAT on the LLE.    Patient seen and evaluated by PMR consultant and found to be an appropriate candidate for acute inpatient rehab.     (18 May 2021 15:33)      TODAY'S SUBJECTIVE & REVIEW OF SYMPTOMS:   Patient seen and evaluated with Dr. Ott. No complaints at this time. No acute events overnight. Doppler of LLE negative.  CLOF: Patient ambulated 4 steps x 2 with mod assist, transfers mod assist, bed mobility max assist     Constiutional:    [ x  ] WNL           [   ] poor appetite   [   ] insomnia   [   ] tired   Cardio:                [ x  ] WNL           [   ] CP   [   ] LINARES   [   ] palpitations               Resp:                   [x   ] WNL           [   ] SOB   [   ] cough   [   ] wheezing   GI:                        [x   ] WNL           [   ] constipation   [   ] diarrhea   [   ] abdominal pain   [   ] nausea   [   ] emesis                                :                      [  x ] WNL           [   ] VAUGHAN  [   ] dysuria   [   ] difficulty voiding             Endo:                   [ x  ] WNL          [   ] polyuria   [   ] temperature intolerance                 Skin:                     [ x  ] WNL          [   ] pain   [   ] wound   [   ] rash   MSK:                    [   ] WNL          [   ] muscle pain   [ x  ] joint pain/ stiffness - left hip   [ x  ] muscle tenderness   [   ] swelling   Neuro:                 [  x ] WNL          [   ] HA   [   ] change in vision   [   ] tremor   [   ] weakness   [   ]dysphagia              Cognitive:           [ x  ] WNL           [   ]confusion      Psych:                  [ x  ] WNL           [   ] hallucinations   [   ]agitation   [   ] delusion   [   ]depression      PHYSICAL EXAM    ICU Vital Signs Last 24 Hrs  T(C): 36.9 (20 May 2021 05:30), Max: 36.9 (20 May 2021 05:30)  T(F): 98.4 (20 May 2021 05:30), Max: 98.4 (20 May 2021 05:30)  HR: 72 (20 May 2021 05:30) (72 - 91)  BP: 136/63 (20 May 2021 05:30) (119/63 - 136/63)  RR: 18 (20 May 2021 05:30) (18 - 20)        Constitutional - [ x  ] NAD, Comfortable        [   ] other:  Chest - [ x   ] CTA     [   ] other:  Cardiovascular - [  x ] RRR, no murmer     [   ] other:  Abdomen - [ x  ] Soft, NT/ND      [   ] other:        -  VAUGHAN CATHETER   [   ] YES  if yes: [   ] NO MEATAL TEAR OR DISCHARGE [   ] other:  Extremities - [ x ] No C/C/E, No calf tenderness       [   ] other:  ROM - [x   ] WFL     [   ] other:  Neurologic Exam -                 Cognitive - [x   ]Awake, Alert, AAO to self, place, date, year, situation         [    ] other:      Communication - [   ]Fluent, No dysarthria       [   ] other:      Motor - No focal deficits                    Right UE -  [ x  ] WNL      [    ] other:                    Left UE -     [ x  ] WNL      [    ] other:                    Right LE -   [x   ] WNL       [    ] other:                    Left LE -      [   ]WNL        [  x  ] other: - hip not testable proximally secondary to pain      Sensory - [   ] Intact to LT      [    ] other:          Reflexes - [ x  ] wnl/ symmetric     [   ] other:     Psychiatric - [x   ]Mood stable, Affect WNL     [   ]other:     Skin - [ x  ] intact      [   ] other      acetaminophen   Tablet .. 650 milliGRAM(s) Oral every 6 hours PRN  enoxaparin Injectable 40 milliGRAM(s) SubCutaneous at bedtime  oxyCODONE    IR 5 milliGRAM(s) Oral every 4 hours PRN  oxyCODONE    IR 10 milliGRAM(s) Oral every 6 hours PRN  oxyCODONE    IR 5 milliGRAM(s) Oral <User Schedule>  polyethylene glycol 3350 17 Gram(s) Oral at bedtime  senna 2 Tablet(s) Oral at bedtime      RECENT LABS/IMAGING                        11.0   10.52 )-----------( 218      ( 19 May 2021 06:46 )             34.1     05-19    139  |  100  |  11  ----------------------------<  107<H>  5.1<H>   |  26  |  0.5<L>    Ca    9.3      19 May 2021 06:46  Phos  4.3     05-18  Mg     2.2     05-19    TPro  5.8<L>  /  Alb  3.5  /  TBili  1.5<H>  /  DBili  0.3<H>  /  AST  22  /  ALT  8   /  AlkPhos  81  05-19

## 2021-05-20 NOTE — PROGRESS NOTE ADULT - ATTENDING COMMENTS
I examined the patient with the resident and we discussed the findings and treatment plan. Tolerating the rehab program well. I agree with the findings and treatment plan as above. The patient continues to require 3 hrs a day of acute inpatient rehab.   Medically stable. No complaints. Participating well in rehab program. Reported to require max assistance to transfer.  Pain controlled on current regimen.

## 2021-05-21 RX ADMIN — OXYCODONE HYDROCHLORIDE 5 MILLIGRAM(S): 5 TABLET ORAL at 08:53

## 2021-05-21 RX ADMIN — ENOXAPARIN SODIUM 40 MILLIGRAM(S): 100 INJECTION SUBCUTANEOUS at 22:08

## 2021-05-21 RX ADMIN — OXYCODONE HYDROCHLORIDE 5 MILLIGRAM(S): 5 TABLET ORAL at 13:40

## 2021-05-21 RX ADMIN — OXYCODONE HYDROCHLORIDE 10 MILLIGRAM(S): 5 TABLET ORAL at 22:44

## 2021-05-21 RX ADMIN — OXYCODONE HYDROCHLORIDE 5 MILLIGRAM(S): 5 TABLET ORAL at 09:00

## 2021-05-21 RX ADMIN — SENNA PLUS 2 TABLET(S): 8.6 TABLET ORAL at 22:08

## 2021-05-21 RX ADMIN — OXYCODONE HYDROCHLORIDE 10 MILLIGRAM(S): 5 TABLET ORAL at 22:14

## 2021-05-21 RX ADMIN — OXYCODONE HYDROCHLORIDE 5 MILLIGRAM(S): 5 TABLET ORAL at 13:30

## 2021-05-21 NOTE — PROGRESS NOTE ADULT - SUBJECTIVE AND OBJECTIVE BOX
Patient is a 80y old  Female who presents with a chief complaint of Left trochanteric fracture s/p ORIF (18 May 2021 15:33)    HPI:  80 year old F with PMHx of glaucoma b/l (legally blind) presents to the ED for after a mechanical fall out of her bed around 6:30AM this morning. Pt reports she was trying to reach for something on her dresser and rolled out of bed onto her left side. Patient reported she was unable to get up on her own due to left hip pain. Prior to this event, patient wass fairly healthy, can walk without assistant. At that time, the patient denied trauma to head, LOC, use of blood thinners, headaches, dizziness, neck pain, back pain, abdominal pain, n/v/d/c, weakness, numbness, tingling, urinary symptoms. Imaging consistent with left trochanteric fracture. Patient underwent left ORIF with a gamma nail on 5/14/21 with Dr. Hircsh. She was cleared for WBAT on the LLE.    Patient seen and evaluated by PMR consultant and found to be an appropriate candidate for acute inpatient rehab.        TODAY'S SUBJECTIVE & REVIEW OF SYMPTOMS:   Patient seen and evaluated with attending physician. Patient reported no complaints at this morning. No acute events overnight.  CLOF: Patient ambulated 4 steps x 2 with mod assist, transfers mod assist, bed mobility max assist     Constitutional    [ x  ] WNL           [   ] poor appetite   [   ] insomnia   [   ] tired   Cardio:                [ x  ] WNL           [   ] CP   [   ] LINARES   [   ] palpitations               Resp:                   [x   ] WNL           [   ] SOB   [   ] cough   [   ] wheezing   GI:                        [x   ] WNL           [   ] constipation   [   ] diarrhea   [   ] abdominal pain   [   ] nausea   [   ] emesis                                :                      [  x ] WNL           [   ] VAUGHAN  [   ] dysuria   [   ] difficulty voiding             Endo:                   [ x  ] WNL          [   ] polyuria   [   ] temperature intolerance                 Skin:                     [ x  ] WNL          [   ] pain   [   ] wound   [   ] rash   MSK:                    [   ] WNL          [   ] muscle pain   [ x  ] joint pain/ stiffness - left hip   [ x  ] muscle tenderness   [   ] swelling   Neuro:                 [  x ] WNL          [   ] HA   [   ] change in vision   [   ] tremor   [   ] weakness   [   ]dysphagia              Cognitive:           [ x  ] WNL           [   ]confusion      Psych:                  [ x  ] WNL           [   ] hallucinations   [   ]agitation   [   ] delusion   [   ]depression      PHYSICAL EXAM  Vital Signs Last 24 Hrs  T(C): 36.7 (21 May 2021 05:36), Max: 37.3 (20 May 2021 12:42)  T(F): 98.1 (21 May 2021 05:36), Max: 99.2 (20 May 2021 12:42)  HR: 76 (21 May 2021 05:36) (76 - 83)  BP: 147/76 (21 May 2021 05:36) (117/61 - 147/76)  BP(mean): --  RR: 18 (21 May 2021 05:36) (18 - 20)  SpO2: --      PHYSICAL EXAMINATION   VItals: T(C): 36.7 (05-21-21 @ 05:36), Max: 37.3 (05-20-21 @ 12:42)  HR: 76 (05-21-21 @ 05:36) (76 - 83)  BP: 147/76 (05-21-21 @ 05:36) (117/61 - 147/76)  RR: 18 (05-21-21 @ 05:36) (18 - 20)  SpO2: --    General:[ x  ] NAD, Resting Comfortable,   [   ] other:                                HEENT: [  ] NC/AT, EOMI, PERRL , Normal Conjunctivae,   [  x ] other: legally blind b/l  Cardio: [ x ] RRR, no murmer,   [   ] other:                              Pulm: [  x ] No Respiratory Distress,  Lungs CTAB,   [   ] other:                       Abdomen: [ x  ]ND/NT, Soft,   [   ] other:    : [ x  ] NO VAUGHAN CATHETER, [   ] VAUGHAN CATHETER- no meatal tear, no discharge, [   ] other:                                            MSK: [   ] No joint swelling, Full ROM,   [x  ] other:   appropriate tenderness on left hip. Incision c/d/i.                                   Ext: [ x  ] No C/C/E, No calf tenderness,   [   ]other:    Skin: [ x  ]intact,   [   ] other:                                                                   Neurological Examination:  Cognitive: [ x   ] AAO x 3,   [    ]  other:                                                                      Attention:  [  x  ] intact,   [    ]  other:                            Memory: [    ] intact,    [    ]  other:     Mood/Affect: [  x  ] wnl,    [    ]  other:                                                                             Communication: [  x ] Fluent, no dysarthria, following commands:  [    ] other:   CN II - XII:  [    ] intact,  [ x   ] other: grossly intact except blind                                                                                        Motor:   RIGHT UE: [ x  ] WNL,  [   ] other:  LEFT    UE: [ x  ] WNL,  [   ] other:  RIGHT LE: [ x  ] WNL,  [   ] other:   LEFT    LE: [   ] WNL,  [  x ] other: 4/5 throughout limited to pain     Tone: [ x   ] wnl,   [    ]  other:  DTRs: [ x  ]symmetric, [   ] other:  Coordination:   [    ] intact,   [    ] other:                                                                           Sensory: [  x  ] Intact to light touch,   [    ] other:            RECENT LABS/IMAGING             No recent labs       Patient is a 80y old  Female who presents with a chief complaint of Left trochanteric fracture s/p ORIF (18 May 2021 15:33)    HPI:  80 year old F with PMHx of glaucoma b/l (legally blind) presents to the ED for after a mechanical fall out of her bed around 6:30AM this morning. Pt reports she was trying to reach for something on her dresser and rolled out of bed onto her left side. Patient reported she was unable to get up on her own due to left hip pain. Prior to this event, patient ambulated without assistance. At that time, the patient denied trauma to head, LOC, use of blood thinners, headaches, dizziness, neck pain, back pain, abdominal pain, n/v/d/c, weakness, numbness, tingling, urinary symptoms. Imaging consistent with left trochanteric fracture. Patient underwent left ORIF with a gamma nail on 5/14/21 with Dr. Hirsch. She was cleared for WBAT on the LLE. Patient seen and evaluated by PMR consultant and found to be an appropriate candidate for acute inpatient rehab.      TODAY'S SUBJECTIVE & REVIEW OF SYMPTOMS:   Patient seen and evaluated with attending physician. Patient reported no complaints at this morning. No acute events overnight.    CLOF: Patient ambulated 10' x 2 with RW mod assist, transfers and bed mobility mod assist.      Constitutional       [ x  ] WNL           [   ] poor appetite   [   ] insomnia   [   ] tired  Cardio:                [ x  ] WNL            [   ] CP   [   ] LINARES   [   ] palpitations              Resp:                   [x   ] WNL           [   ] SOB   [   ] cough   [   ] wheezing  GI:                        [x   ] WNL          [   ] constipation   [   ] diarrhea   [   ] abdominal pain   [   ] nausea   [   ] emesis                               :                      [  x ] WNL           [   ] VAUGHAN  [   ] dysuria   [   ] difficulty voiding            Endo:                   [ x  ] WNL           [   ] polyuria   [   ] temperature intolerance                Skin:                     [ x  ] WNL          [   ] pain   [   ] wound   [   ] rash  MSK:                    [   ] WNL            [   ] muscle pain   [ x  ] joint pain/ stiffness - left hip   [ x  ] muscle tenderness   [   ] swelling  Neuro:                 [  x ] WNL           [   ] HA   [   ] change in vision   [   ] tremor   [   ] weakness   [   ]dysphagia             Cognitive:             [ x  ] WNL           [   ]confusion     Psych:                  [ x  ] WNL           [   ] hallucinations   [   ]agitation   [   ] delusion   [   ]depression      PHYSICAL EXAM  Vital Signs Last 24 Hrs  Vital Signs Last 24 Hrs  T(C): 36.7 (21 May 2021 05:36), Max: 37.3 (20 May 2021 12:42)  T(F): 98.1 (21 May 2021 05:36), Max: 99.2 (20 May 2021 12:42)  HR: 76 (21 May 2021 05:36) (76 - 83)  BP: 147/76 (21 May 2021 05:36) (117/61 - 147/76)  BP(mean): --  RR: 18 (21 May 2021 05:36) (18 - 20)  SpO2: --    General:[ x  ] NAD, Resting Comfortable,   [   ] other:                                HEENT: [  ] NC/AT, EOMI, PERRL , Normal Conjunctivae,   [  x ] other: legally blind b/l  Cardio: [ x ] RRR, no murmur   [   ] other:                              Pulm: [  x ] No Respiratory Distress,  Lungs CTAB,   [   ] other:                       Abdomen: [ x  ] ND/NT, Soft,   [   ] other:    : [ x  ] NO VAUGHAN CATHETER, [   ] VAUGHAN CATHETER- no meatal tear, no discharge, [   ] other:                                            MSK: [   ] No joint swelling, Full ROM,   [x  ] other:   appropriate tenderness on left hip. Incision with surgical gel dressing c/d/i                                   Ext: [ x  ] No C/C/E, No calf tenderness,   [   ]other:    Skin: [ x  ]intact,   [   ] other:                                                                   Neurological Examination:  Cognitive: [ x  ] AAO x 3,   [    ]  other:                                                                      Attention:  [  x  ] intact,   [    ]  other:                            Memory: [    ] intact,    [    ]  other:     Mood/Affect: [  x  ] wnl,    [    ]  other:                                                                             Communication: [  x ] Fluent, no dysarthria, following commands:  [    ] other:   CN II - XII:  [    ] intact,  [ x   ] other: grossly intact except blind                                                                                        Motor:   RIGHT UE: [ x  ] WNL,  [   ] other:  LEFT    UE: [ x  ] WNL,  [   ] other:  RIGHT LE: [ x  ] WNL,  [   ] other:   LEFT    LE: [   ] WNL,  [  x ] other: 4/5 throughout limited to pain     Tone: [ x   ] wnl,   [    ]  other:  DTRs: [ x  ]symmetric, [   ] other:  Coordination:   [    ] intact,   [    ] other:                                                                           Sensory: [  x  ] Intact to light touch,   [    ] other:          RECENT LABS/IMAGING             No recent labs

## 2021-05-21 NOTE — PROGRESS NOTE ADULT - ATTENDING COMMENTS
I examined the patient with the resident and we discussed the findings and treatment plan. Tolerating the rehab program well. I agree with the findings and treatment plan as above. The patient continues to require 3 hrs a day of acute inpatient rehab.   Medically stable. No complaints. Participating well in rehab program. Reported to require max assistance to transfer.  Pain controlled on current regimen. I examined the patient with the resident and we discussed the findings and treatment plan. Tolerating the rehab program well. I agree with the findings and treatment plan as above. which I corrected as indicated.    The patient continues to require 3 hrs a day of acute inpatient rehab.   Medically stable. No complaints. Participating well in rehab program. Making functional gains. Pain controlled.

## 2021-05-21 NOTE — PROGRESS NOTE ADULT - ASSESSMENT
Rehab of left intertrochanteric hip fracture, s/p ORIF/gamma nail.     # Left intertrochanteric hip fx, s/p mechanical fall,  s/p left ORIF on 5/14  - Oxycodone q4hrs with breakfast and lunch standing  - WBAT LLE as per ortho  - Incision c/d/i.     - Full code       -Pain control: control with oxycodone    -GI/Bowel Mgmt: bowel med as needed    -Bladder management: purewick catheter     -Skin:  Left thigh incision c/d/i.    No active issues at this time      - Diet: DASH, heart healthy  -Legally blind, history of glaucoma.  - Anemia  mild, monitor    Precautions / PROPHYLAXIS:    - Falls  - Ortho: Weight bearing status: WBAT on the LLE   - DVT prophylaxis: Lovenox; warrants venous duplex was neg 5/18 for DVT LLE Rehab of left intertrochanteric hip fracture, s/p ORIF/gamma nail.     # Left intertrochanteric hip fx, s/p mechanical fall, s/p left ORIF on 5/14  - Oxycodone q4hrs with breakfast and lunch standing  - WBAT LLE as per ortho  - Incision c/d/i.     - Full code       -Pain control: control with oxycodone    -GI/Bowel Mgmt: bowel med as needed    -Bladder management: purewick catheter     -Skin:  Left thigh incision c/d/i.    No active issues at this time      - Diet: DASH, heart healthy  -Legally blind, history of glaucoma.  - Anemia  mild, monitor    Precautions / PROPHYLAXIS:    - Falls  - Ortho: Weight bearing status: WBAT on the LLE   - DVT prophylaxis: Lovenox; venous duplex was neg 5/18 for DVT LLE Rehab of left intertrochanteric hip fracture, s/p ORIF/gamma nail.     # Left intertrochanteric hip fx, s/p mechanical fall, s/p left ORIF on 5/14  - Oxycodone q4hrs with breakfast and lunch standing  - WBAT LLE as per ortho  - Incision c/d/i.     - Full code       -Pain control: control with oxycodone    -GI/Bowel Mgmt: bowel med as needed    -Bladder management: purewick catheter     -Skin:  Left thigh incision c/d/i. Plan to remove dressing 7-10 days. date of of surgery 5/14/21  No active issues at this time      - Diet: DASH, heart healthy  - Legally blind, history of glaucoma.  - Anemia: last Hgb 11  mild, monitor    Precautions / PROPHYLAXIS:    - Falls  - Ortho: Weight bearing status: WBAT on the LLE   - DVT prophylaxis: Lovenox; venous duplex was neg 5/18 for DVT LLE Rehab of left intertrochanteric hip fracture, s/p ORIF/gamma nail.     # Left intertrochanteric hip fx, s/p mechanical fall, s/p left ORIF on 5/14  - Oxycodone q4hrs with breakfast and lunch standing  - WBAT LLE as per ortho  - Incision with Biogel dressing. Remove next week  - Full code       -Pain control: control with oxycodone    -GI/Bowel Mgmt: bowel med as needed    -Bladder management: purewick catheter      - Diet: DASH, heart healthy  - Legally blind, history of glaucoma.  - Anemia: last Hgb 11  mild, monitor    Precautions / PROPHYLAXIS:    - Falls  - Ortho: Weight bearing status: WBAT on the LLE   - DVT prophylaxis: Lovenox; venous duplex was neg 5/18 for DVT LLE

## 2021-05-22 RX ADMIN — OXYCODONE HYDROCHLORIDE 5 MILLIGRAM(S): 5 TABLET ORAL at 08:29

## 2021-05-22 RX ADMIN — OXYCODONE HYDROCHLORIDE 10 MILLIGRAM(S): 5 TABLET ORAL at 06:34

## 2021-05-22 RX ADMIN — OXYCODONE HYDROCHLORIDE 5 MILLIGRAM(S): 5 TABLET ORAL at 13:53

## 2021-05-22 RX ADMIN — OXYCODONE HYDROCHLORIDE 10 MILLIGRAM(S): 5 TABLET ORAL at 06:04

## 2021-05-22 RX ADMIN — ENOXAPARIN SODIUM 40 MILLIGRAM(S): 100 INJECTION SUBCUTANEOUS at 21:10

## 2021-05-22 RX ADMIN — OXYCODONE HYDROCHLORIDE 5 MILLIGRAM(S): 5 TABLET ORAL at 12:51

## 2021-05-22 RX ADMIN — OXYCODONE HYDROCHLORIDE 5 MILLIGRAM(S): 5 TABLET ORAL at 23:39

## 2021-05-22 NOTE — PROGRESS NOTE ADULT - SUBJECTIVE AND OBJECTIVE BOX
MEDICATIONS  (STANDING):  enoxaparin Injectable 40 milliGRAM(s) SubCutaneous at bedtime  oxyCODONE    IR 5 milliGRAM(s) Oral <User Schedule>  polyethylene glycol 3350 17 Gram(s) Oral at bedtime  senna 2 Tablet(s) Oral at bedtime    MEDICATIONS  (PRN):  acetaminophen   Tablet .. 650 milliGRAM(s) Oral every 6 hours PRN Temp greater or equal to 38C (100.4F), Mild Pain (1 - 3)  oxyCODONE    IR 5 milliGRAM(s) Oral every 4 hours PRN Moderate Pain (4 - 6)  oxyCODONE    IR 10 milliGRAM(s) Oral every 6 hours PRN Severe Pain (7 - 10)      Patient was stable overnight and expresses no new complaints.    T(C): 36.7 (05-22-21 @ 05:29), Max: 36.9 (05-21-21 @ 12:40)  HR: 80 (05-22-21 @ 05:29) (80 - 94)  BP: 142/72 (05-22-21 @ 05:29) (142/66 - 149/69)  RR: 20 (05-22-21 @ 05:29) (18 - 20)  SpO2: --      PE:    Alert   LUNGS- clear  COR- RRR S1S2  ABD- SOFT, NT  EXTR- w/o edema  NEURO- stable                      Rehab for left hip intertrochanteric fx    Continue full acute rehab program.

## 2021-05-23 RX ADMIN — OXYCODONE HYDROCHLORIDE 5 MILLIGRAM(S): 5 TABLET ORAL at 07:02

## 2021-05-23 RX ADMIN — OXYCODONE HYDROCHLORIDE 10 MILLIGRAM(S): 5 TABLET ORAL at 04:00

## 2021-05-23 RX ADMIN — OXYCODONE HYDROCHLORIDE 5 MILLIGRAM(S): 5 TABLET ORAL at 00:30

## 2021-05-23 RX ADMIN — OXYCODONE HYDROCHLORIDE 10 MILLIGRAM(S): 5 TABLET ORAL at 03:36

## 2021-05-23 RX ADMIN — OXYCODONE HYDROCHLORIDE 5 MILLIGRAM(S): 5 TABLET ORAL at 18:41

## 2021-05-23 RX ADMIN — ENOXAPARIN SODIUM 40 MILLIGRAM(S): 100 INJECTION SUBCUTANEOUS at 21:11

## 2021-05-23 RX ADMIN — OXYCODONE HYDROCHLORIDE 5 MILLIGRAM(S): 5 TABLET ORAL at 14:12

## 2021-05-23 RX ADMIN — OXYCODONE HYDROCHLORIDE 5 MILLIGRAM(S): 5 TABLET ORAL at 13:09

## 2021-05-23 NOTE — PROGRESS NOTE ADULT - SUBJECTIVE AND OBJECTIVE BOX
MEDICATIONS  (STANDING):  enoxaparin Injectable 40 milliGRAM(s) SubCutaneous at bedtime  oxyCODONE    IR 5 milliGRAM(s) Oral <User Schedule>  polyethylene glycol 3350 17 Gram(s) Oral at bedtime  senna 2 Tablet(s) Oral at bedtime    MEDICATIONS  (PRN):  acetaminophen   Tablet .. 650 milliGRAM(s) Oral every 6 hours PRN Temp greater or equal to 38C (100.4F), Mild Pain (1 - 3)  oxyCODONE    IR 5 milliGRAM(s) Oral every 4 hours PRN Moderate Pain (4 - 6)  oxyCODONE    IR 10 milliGRAM(s) Oral every 6 hours PRN Severe Pain (7 - 10)      Patient was stable overnight and expresses no new complaints.    T(C): 36.4 (05-23-21 @ 05:47), Max: 37.1 (05-22-21 @ 14:01)  HR: 81 (05-23-21 @ 05:47) (73 - 91)  BP: 130/68 (05-23-21 @ 05:47) (130/68 - 144/77)  RR: 18 (05-23-21 @ 05:47) (18 - 18)  SpO2: --      PE:    Alert   LUNGS- clear  COR- RRR S1S2  ABD- SOFT, NT  EXTR- w/o edema  NEURO- stable                      Rehab for left hip intertrochanteric fracture/ORIF.    Continue full acute rehab program.

## 2021-05-24 LAB
ALBUMIN SERPL ELPH-MCNC: 3.5 G/DL — SIGNIFICANT CHANGE UP (ref 3.5–5.2)
ALP SERPL-CCNC: 132 U/L — HIGH (ref 30–115)
ALT FLD-CCNC: 9 U/L — SIGNIFICANT CHANGE UP (ref 0–41)
ANION GAP SERPL CALC-SCNC: 9 MMOL/L — SIGNIFICANT CHANGE UP (ref 7–14)
AST SERPL-CCNC: 22 U/L — SIGNIFICANT CHANGE UP (ref 0–41)
BASOPHILS # BLD AUTO: 0.06 K/UL — SIGNIFICANT CHANGE UP (ref 0–0.2)
BASOPHILS NFR BLD AUTO: 0.7 % — SIGNIFICANT CHANGE UP (ref 0–1)
BILIRUB SERPL-MCNC: 1.1 MG/DL — SIGNIFICANT CHANGE UP (ref 0.2–1.2)
BUN SERPL-MCNC: 11 MG/DL — SIGNIFICANT CHANGE UP (ref 10–20)
CALCIUM SERPL-MCNC: 9.2 MG/DL — SIGNIFICANT CHANGE UP (ref 8.5–10.1)
CHLORIDE SERPL-SCNC: 103 MMOL/L — SIGNIFICANT CHANGE UP (ref 98–110)
CO2 SERPL-SCNC: 29 MMOL/L — SIGNIFICANT CHANGE UP (ref 17–32)
CREAT SERPL-MCNC: 0.5 MG/DL — LOW (ref 0.7–1.5)
EOSINOPHIL # BLD AUTO: 0.18 K/UL — SIGNIFICANT CHANGE UP (ref 0–0.7)
EOSINOPHIL NFR BLD AUTO: 2.2 % — SIGNIFICANT CHANGE UP (ref 0–8)
GLUCOSE SERPL-MCNC: 96 MG/DL — SIGNIFICANT CHANGE UP (ref 70–99)
HCT VFR BLD CALC: 33.7 % — LOW (ref 37–47)
HGB BLD-MCNC: 10.7 G/DL — LOW (ref 12–16)
IMM GRANULOCYTES NFR BLD AUTO: 0.7 % — HIGH (ref 0.1–0.3)
LYMPHOCYTES # BLD AUTO: 1.58 K/UL — SIGNIFICANT CHANGE UP (ref 1.2–3.4)
LYMPHOCYTES # BLD AUTO: 19 % — LOW (ref 20.5–51.1)
MAGNESIUM SERPL-MCNC: 2.4 MG/DL — SIGNIFICANT CHANGE UP (ref 1.8–2.4)
MCHC RBC-ENTMCNC: 30.6 PG — SIGNIFICANT CHANGE UP (ref 27–31)
MCHC RBC-ENTMCNC: 31.8 G/DL — LOW (ref 32–37)
MCV RBC AUTO: 96.3 FL — SIGNIFICANT CHANGE UP (ref 81–99)
MONOCYTES # BLD AUTO: 0.98 K/UL — HIGH (ref 0.1–0.6)
MONOCYTES NFR BLD AUTO: 11.8 % — HIGH (ref 1.7–9.3)
NEUTROPHILS # BLD AUTO: 5.44 K/UL — SIGNIFICANT CHANGE UP (ref 1.4–6.5)
NEUTROPHILS NFR BLD AUTO: 65.6 % — SIGNIFICANT CHANGE UP (ref 42.2–75.2)
NRBC # BLD: 0 /100 WBCS — SIGNIFICANT CHANGE UP (ref 0–0)
PLATELET # BLD AUTO: 320 K/UL — SIGNIFICANT CHANGE UP (ref 130–400)
POTASSIUM SERPL-MCNC: 4.4 MMOL/L — SIGNIFICANT CHANGE UP (ref 3.5–5)
POTASSIUM SERPL-SCNC: 4.4 MMOL/L — SIGNIFICANT CHANGE UP (ref 3.5–5)
PROT SERPL-MCNC: 5.6 G/DL — LOW (ref 6–8)
RBC # BLD: 3.5 M/UL — LOW (ref 4.2–5.4)
RBC # FLD: 13.8 % — SIGNIFICANT CHANGE UP (ref 11.5–14.5)
SODIUM SERPL-SCNC: 141 MMOL/L — SIGNIFICANT CHANGE UP (ref 135–146)
WBC # BLD: 8.3 K/UL — SIGNIFICANT CHANGE UP (ref 4.8–10.8)
WBC # FLD AUTO: 8.3 K/UL — SIGNIFICANT CHANGE UP (ref 4.8–10.8)

## 2021-05-24 RX ADMIN — OXYCODONE HYDROCHLORIDE 10 MILLIGRAM(S): 5 TABLET ORAL at 23:27

## 2021-05-24 RX ADMIN — OXYCODONE HYDROCHLORIDE 5 MILLIGRAM(S): 5 TABLET ORAL at 18:59

## 2021-05-24 RX ADMIN — OXYCODONE HYDROCHLORIDE 5 MILLIGRAM(S): 5 TABLET ORAL at 06:02

## 2021-05-24 RX ADMIN — OXYCODONE HYDROCHLORIDE 5 MILLIGRAM(S): 5 TABLET ORAL at 12:06

## 2021-05-24 RX ADMIN — OXYCODONE HYDROCHLORIDE 5 MILLIGRAM(S): 5 TABLET ORAL at 19:43

## 2021-05-24 RX ADMIN — OXYCODONE HYDROCHLORIDE 10 MILLIGRAM(S): 5 TABLET ORAL at 22:21

## 2021-05-24 RX ADMIN — OXYCODONE HYDROCHLORIDE 5 MILLIGRAM(S): 5 TABLET ORAL at 10:54

## 2021-05-24 RX ADMIN — ENOXAPARIN SODIUM 40 MILLIGRAM(S): 100 INJECTION SUBCUTANEOUS at 21:56

## 2021-05-24 NOTE — PROGRESS NOTE ADULT - SUBJECTIVE AND OBJECTIVE BOX
Patient is a 80y old  Female who presents with a chief complaint of Left trochanteric fracture s/p ORIF (18 May 2021 15:33)    HPI:  80 year old F with PMHx of glaucoma b/l (legally blind) presents to the ED for after a mechanical fall out of her bed around 6:30AM this morning. Pt reports she was trying to reach for something on her dresser and rolled out of bed onto her left side. Patient reported she was unable to get up on her own due to left hip pain. Prior to this event, patient ambulated without assistance. At that time, the patient denied trauma to head, LOC, use of blood thinners, headaches, dizziness, neck pain, back pain, abdominal pain, n/v/d/c, weakness, numbness, tingling, urinary symptoms. Imaging consistent with left trochanteric fracture. Patient underwent left ORIF with a gamma nail on 5/14/21 with Dr. Hirsch. She was cleared for WBAT on the LLE. Patient seen and evaluated by PMR consultant and found to be an appropriate candidate for acute inpatient rehab.      TODAY'S SUBJECTIVE & REVIEW OF SYMPTOMS:   Patient seen and evaluated with attending physician in gym. Patient up and walking with Pt support. no complaints   CLOF: Patient ambulated 10' x 2 with RW mod assist, transfers and bed mobility mod assist.      Constitutional       [ x  ] WNL           [   ] poor appetite   [   ] insomnia   [   ] tired  Cardio:                [ x  ] WNL            [   ] CP   [   ] LINARES   [   ] palpitations              Resp:                   [x   ] WNL           [   ] SOB   [   ] cough   [   ] wheezing  GI:                        [x   ] WNL          [   ] constipation   [   ] diarrhea   [   ] abdominal pain   [   ] nausea   [   ] emesis                               :                      [  x ] WNL           [   ] VAUGHAN  [   ] dysuria   [   ] difficulty voiding            Endo:                   [ x  ] WNL           [   ] polyuria   [   ] temperature intolerance                Skin:                     [ x  ] WNL          [   ] pain   [   ] wound   [   ] rash  MSK:                    [   ] WNL            [   ] muscle pain   [ x  ] joint pain/ stiffness - left hip   [ x  ] muscle tenderness   [   ] swelling  Neuro:                 [  x ] WNL           [   ] HA   [   ] change in vision   [   ] tremor   [   ] weakness   [   ]dysphagia             Cognitive:             [ x  ] WNL           [   ]confusion     Psych:                  [ x  ] WNL           [   ] hallucinations   [   ]agitation   [   ] delusion   [   ]depression      PHYSICAL EXAM  Vital Signs Last 24 Hrs  T(C): 36.8 (24 May 2021 05:56), Max: 36.8 (24 May 2021 05:56)  T(F): 98.3 (24 May 2021 05:56), Max: 98.3 (24 May 2021 05:56)  HR: 71 (24 May 2021 05:56) (71 - 100)  BP: 141/69 (24 May 2021 05:56) (127/68 - 141/69)  BP(mean): --  RR: 18 (24 May 2021 05:56) (18 - 18)  SpO2: 97% (24 May 2021 05:56) (97% - 97%)    General:[ x  ] NAD, Resting Comfortable,   [   ] other:                                HEENT: [  ] NC/AT, EOMI, PERRL , Normal Conjunctivae,   [  x ] other: legally blind b/l  Cardio: [ x ] RRR, no murmur   [   ] other:                              Pulm: [  x ] No Respiratory Distress,  Lungs CTAB,   [   ] other:                       Abdomen: [ x  ] ND/NT, Soft,   [   ] other:    : [ x  ] NO VAUGHAN CATHETER, [   ] VAUGHAN CATHETER- no meatal tear, no discharge, [   ] other:                                            MSK: [   ] No joint swelling, Full ROM,   [x  ] other:   appropriate tenderness on left hip. Incision with surgical gel dressing c/d/i                                   Ext: [ x  ] No C/C/E, No calf tenderness,   [   ]other:    Skin: [ x  ]intact,   [   ] other:                                                                   Neurological Examination:  Cognitive: [ x  ] AAO x 3,   [    ]  other:                                                                      Attention:  [  x  ] intact,   [    ]  other:                            Memory: [    ] intact,    [    ]  other:     Mood/Affect: [  x  ] wnl,    [    ]  other:                                                                             Communication: [  x ] Fluent, no dysarthria, following commands:  [    ] other:   CN II - XII:  [    ] intact,  [ x   ] other: grossly intact except blind                                                                                        Motor:   RIGHT UE: [ x  ] WNL,  [   ] other:  LEFT    UE: [ x  ] WNL,  [   ] other:  RIGHT LE: [ x  ] WNL,  [   ] other:   LEFT    LE: [   ] WNL,  [  x ] other: 4/5 throughout limited to pain     Tone: [ x   ] wnl,   [    ]  other:  DTRs: [ x  ]symmetric, [   ] other:  Coordination:   [    ] intact,   [    ] other:                                                                           Sensory: [  x  ] Intact to light touch,   [    ] other:          RECENT LABS/IMAGING             No recent labs    MEDICATIONS  (STANDING):  enoxaparin Injectable 40 milliGRAM(s) SubCutaneous at bedtime  oxyCODONE    IR 5 milliGRAM(s) Oral <User Schedule>  polyethylene glycol 3350 17 Gram(s) Oral at bedtime  senna 2 Tablet(s) Oral at bedtime    MEDICATIONS  (PRN):  acetaminophen   Tablet .. 650 milliGRAM(s) Oral every 6 hours PRN Temp greater or equal to 38C (100.4F), Mild Pain (1 - 3)  oxyCODONE    IR 5 milliGRAM(s) Oral every 4 hours PRN Moderate Pain (4 - 6)  oxyCODONE    IR 10 milliGRAM(s) Oral every 6 hours PRN Severe Pain (7 - 10)     Patient is a 80y old  Female who presents with a chief complaint of Left trochanteric fracture s/p ORIF (18 May 2021 15:33)    HPI:  80 year old F with PMHx of glaucoma b/l (legally blind) presents to the ED for after a mechanical fall out of her bed around 6:30AM this morning. Pt reports she was trying to reach for something on her dresser and rolled out of bed onto her left side. Patient reported she was unable to get up on her own due to left hip pain. Prior to this event, patient ambulated without assistance. At that time, the patient denied trauma to head, LOC, use of blood thinners, headaches, dizziness, neck pain, back pain, abdominal pain, n/v/d/c, weakness, numbness, tingling, urinary symptoms. Imaging consistent with left trochanteric fracture. Patient underwent left ORIF with a gamma nail on 5/14/21 with Dr. Hirsch. She was cleared for WBAT on the LLE. Patient seen and evaluated by PMR consultant and found to be an appropriate candidate for acute inpatient rehab.      TODAY'S SUBJECTIVE & REVIEW OF SYMPTOMS:   Patient seen and evaluated with attending physician in gym. Patient up and walking with Pt support. no complaints     CLOF: Patient ambulated 10' x 2 with RW mod assist, transfers and bed mobility mod assist.      Constitutional       [ x  ] WNL           [   ] poor appetite   [   ] insomnia   [   ] tired  Cardio:                [ x  ] WNL            [   ] CP   [   ] LINARES   [   ] palpitations              Resp:                   [x   ] WNL           [   ] SOB   [   ] cough   [   ] wheezing  GI:                        [x   ] WNL          [   ] constipation   [   ] diarrhea   [   ] abdominal pain   [   ] nausea   [   ] emesis                               :                      [  x ] WNL           [   ] VAUGHAN  [   ] dysuria   [   ] difficulty voiding            Endo:                   [ x  ] WNL           [   ] polyuria   [   ] temperature intolerance                Skin:                     [ x  ] WNL          [   ] pain   [   ] wound   [   ] rash  MSK:                    [   ] WNL            [   ] muscle pain   [ x  ] joint pain/ stiffness - left hip   [ x  ] muscle tenderness   [   ] swelling  Neuro:                 [  x ] WNL           [   ] HA   [   ] change in vision   [   ] tremor   [   ] weakness   [   ]dysphagia             Cognitive:             [ x  ] WNL           [   ]confusion     Psych:                  [ x  ] WNL           [   ] hallucinations   [   ]agitation   [   ] delusion   [   ]depression      PHYSICAL EXAM  Vital Signs Last 24 Hrs  T(C): 36.8 (24 May 2021 05:56), Max: 36.8 (24 May 2021 05:56)  T(F): 98.3 (24 May 2021 05:56), Max: 98.3 (24 May 2021 05:56)  HR: 71 (24 May 2021 05:56) (71 - 100)  BP: 141/69 (24 May 2021 05:56) (127/68 - 141/69)  BP(mean): --  RR: 18 (24 May 2021 05:56) (18 - 18)  SpO2: 97% (24 May 2021 05:56) (97% - 97%)    General:[ x  ] NAD, Resting Comfortable,   [   ] other:                                HEENT: [  ] NC/AT, EOMI, PERRL , Normal Conjunctivae,   [  x ] other: legally blind b/l  Cardio: [ x ] RRR, no murmur   [   ] other:                              Pulm: [  x ] No Respiratory Distress,  Lungs CTAB,   [   ] other:                       Abdomen: [ x  ] ND/NT, Soft,   [   ] other:    : [ x  ] NO VAUGHAN CATHETER, [   ] VAUGHAN CATHETER- no meatal tear, no discharge, [   ] other:                                            MSK: [   ] No joint swelling, Full ROM,   [x  ] other:   appropriate tenderness on left hip. Incision with surgical gel dressing c/d/i                                   Ext: [ x  ] No C/C/E, No calf tenderness,   [   ]other:    Skin: [ x  ]intact,   [   ] other:                                                                   Neurological Examination:  Cognitive: [ x  ] AAO x 3,   [    ]  other:                                                                      Attention:  [  x  ] intact,   [    ]  other:                            Memory: [    ] intact,    [    ]  other:     Mood/Affect: [  x  ] wnl,    [    ]  other:                                                                             Communication: [  x ] Fluent, no dysarthria, following commands:  [    ] other:   CN II - XII:  [    ] intact,  [ x   ] other: grossly intact except blind                                                                                        Motor:   RIGHT UE: [ x  ] WNL,  [   ] other:  LEFT    UE: [ x  ] WNL,  [   ] other:  RIGHT LE: [ x  ] WNL,  [   ] other:   LEFT    LE: [   ] WNL,  [  x ] other: hip not testable, o/w 4/5 throughout limited to pain     Tone: [ x   ] wnl,   [    ]  other:  DTRs: [ x  ]symmetric, [   ] other:  Coordination:   [    ] intact,   [    ] other:                                                                           Sensory: [  x  ] Intact to light touch,   [    ] other:          RECENT LABS/IMAGING                                   10.7   8.30  )-----------( 320      ( 24 May 2021 05:53 )             33.7     05-24    141  |  103  |  11  ----------------------------<  96  4.4   |  29  |  0.5<L>    Ca    9.2      24 May 2021 05:53  Mg     2.4     05-24    TPro  5.6<L>  /  Alb  3.5  /  TBili  1.1  /  DBili  x   /  AST  22  /  ALT  9   /  AlkPhos  132<H>  05-24            MEDICATIONS  (STANDING):  enoxaparin Injectable 40 milliGRAM(s) SubCutaneous at bedtime  oxyCODONE    IR 5 milliGRAM(s) Oral <User Schedule>  polyethylene glycol 3350 17 Gram(s) Oral at bedtime  senna 2 Tablet(s) Oral at bedtime    MEDICATIONS  (PRN):  acetaminophen   Tablet .. 650 milliGRAM(s) Oral every 6 hours PRN Temp greater or equal to 38C (100.4F), Mild Pain (1 - 3)  oxyCODONE    IR 5 milliGRAM(s) Oral every 4 hours PRN Moderate Pain (4 - 6)  oxyCODONE    IR 10 milliGRAM(s) Oral every 6 hours PRN Severe Pain (7 - 10)

## 2021-05-24 NOTE — PROGRESS NOTE ADULT - ATTENDING COMMENTS
I examined the patient with the resident and we discussed the findings and treatment plan. Tolerating the rehab program well. I agree with the findings and treatment plan as above, which I modified as needed. The patient continues to require 3 hrs a day of acute inpatient rehab.   Pain is fairly well controlled. Post-op anemia is stable. No other complaints.

## 2021-05-24 NOTE — PROGRESS NOTE ADULT - ASSESSMENT
Rehab of left intertrochanteric hip fracture, s/p ORIF/gamma nail.     # Left intertrochanteric hip fx, s/p mechanical fall, s/p left ORIF on 5/14  - Oxycodone q4hrs with breakfast and lunch standing  - WBAT LLE as per ortho  - Incision with Biogel dressing. Remove this week  - Full code       -Pain control: control with oxycodone    -GI/Bowel Mgmt: bowel med as needed    -Bladder management: purewick catheter      - Diet: DASH, heart healthy  - Legally blind, history of glaucoma.  - Anemia: last Hgb 11  mild, monitor    Precautions / PROPHYLAXIS:    - Falls  - Ortho: Weight bearing status: WBAT on the LLE   - DVT prophylaxis: Lovenox; venous duplex was neg 5/18 for DVT LLE Rehab of left intertrochanteric hip fracture, s/p ORIF/gamma nail.     # Left intertrochanteric hip fx, s/p mechanical fall, s/p left ORIF on 5/14  - Oxycodone q4hrs with breakfast and lunch standing  - WBAT LLE as per ortho  - Incision with Biogel dressing. Remove this week  - Full code       -Pain control: fair control with oxycodone    -GI/Bowel Mgmt: bowel med as needed      - Diet: DASH, heart healthy    - Legally blind, history of glaucoma.    - Anemia: last Hgb 11  mild, stable, monitor    Precautions / PROPHYLAXIS:    - Falls  - Ortho: Weight bearing status: WBAT on the LLE   - DVT prophylaxis: Lovenox; venous duplex was neg 5/18 for DVT LLE

## 2021-05-25 RX ADMIN — ENOXAPARIN SODIUM 40 MILLIGRAM(S): 100 INJECTION SUBCUTANEOUS at 21:40

## 2021-05-25 RX ADMIN — OXYCODONE HYDROCHLORIDE 5 MILLIGRAM(S): 5 TABLET ORAL at 06:52

## 2021-05-25 RX ADMIN — OXYCODONE HYDROCHLORIDE 5 MILLIGRAM(S): 5 TABLET ORAL at 13:37

## 2021-05-25 RX ADMIN — OXYCODONE HYDROCHLORIDE 5 MILLIGRAM(S): 5 TABLET ORAL at 12:06

## 2021-05-25 RX ADMIN — OXYCODONE HYDROCHLORIDE 10 MILLIGRAM(S): 5 TABLET ORAL at 19:02

## 2021-05-25 RX ADMIN — SENNA PLUS 2 TABLET(S): 8.6 TABLET ORAL at 21:43

## 2021-05-25 RX ADMIN — OXYCODONE HYDROCHLORIDE 10 MILLIGRAM(S): 5 TABLET ORAL at 17:44

## 2021-05-25 RX ADMIN — POLYETHYLENE GLYCOL 3350 17 GRAM(S): 17 POWDER, FOR SOLUTION ORAL at 21:40

## 2021-05-25 RX ADMIN — OXYCODONE HYDROCHLORIDE 5 MILLIGRAM(S): 5 TABLET ORAL at 07:13

## 2021-05-25 NOTE — PROGRESS NOTE ADULT - ASSESSMENT
Rehab of left intertrochanteric hip fracture, s/p ORIF/gamma nail.     # Left intertrochanteric hip fx, s/p mechanical fall, s/p left ORIF on 5/14  - Oxycodone q4hrs with breakfast and lunch standing  - WBAT LLE as per ortho  - Incision with staples  plan to remove this week  - Full code       -Pain control: fair control with oxycodone    -GI/Bowel Mgmt: bowel med as needed      - Diet: DASH, heart healthy    - Legally blind, history of glaucoma.    - Anemia: last Hgb 11  mild, stable, monitor    Precautions / PROPHYLAXIS:    - Falls  - Ortho: Weight bearing status: WBAT on the LLE   - DVT prophylaxis: Lovenox; venous duplex was neg 5/18 for DVT LLE

## 2021-05-25 NOTE — PROGRESS NOTE ADULT - SUBJECTIVE AND OBJECTIVE BOX
Patient is a 80y old  Female who presents with a chief complaint of Left trochanteric fracture s/p ORIF (18 May 2021 15:33)    HPI:  80 year old F with PMHx of glaucoma b/l (legally blind) presents to the ED for after a mechanical fall out of her bed around 6:30AM this morning. Pt reports she was trying to reach for something on her dresser and rolled out of bed onto her left side. Patient reported she was unable to get up on her own due to left hip pain. Prior to this event, patient ambulated without assistance. At that time, the patient denied trauma to head, LOC, use of blood thinners, headaches, dizziness, neck pain, back pain, abdominal pain, n/v/d/c, weakness, numbness, tingling, urinary symptoms. Imaging consistent with left trochanteric fracture. Patient underwent left ORIF with a gamma nail on 5/14/21 with Dr. Hirsch. She was cleared for WBAT on the LLE. Patient seen and evaluated by PMR consultant and found to be an appropriate candidate for acute inpatient rehab.      TODAY'S SUBJECTIVE & REVIEW OF SYMPTOMS:   Patient seen and evaluated with attending physician in gym. Patient up and walking with Pt support. no complaints     CLOF: Patient ambulated 10' x 2 with RW mod assist, transfers and bed mobility mod assist.      Constitutional       [ x  ] WNL           [   ] poor appetite   [   ] insomnia   [   ] tired  Cardio:                [ x  ] WNL            [   ] CP   [   ] LINARES   [   ] palpitations              Resp:                   [x   ] WNL           [   ] SOB   [   ] cough   [   ] wheezing  GI:                        [x   ] WNL          [   ] constipation   [   ] diarrhea   [   ] abdominal pain   [   ] nausea   [   ] emesis                               :                      [  x ] WNL           [   ] VAUGHAN  [   ] dysuria   [   ] difficulty voiding            Endo:                   [ x  ] WNL           [   ] polyuria   [   ] temperature intolerance                Skin:                     [ x  ] WNL          [   ] pain   [   ] wound   [   ] rash  MSK:                    [   ] WNL            [   ] muscle pain   [ x  ] joint pain/ stiffness - left hip   [ x  ] muscle tenderness   [   ] swelling  Neuro:                 [  x ] WNL           [   ] HA   [   ] change in vision   [   ] tremor   [   ] weakness   [   ]dysphagia             Cognitive:             [ x  ] WNL           [   ]confusion     Psych:                  [ x  ] WNL           [   ] hallucinations   [   ]agitation   [   ] delusion   [   ]depression      PHYSICAL EXAM  Vital Signs Last 24 Hrs  T(C): 36.8 (25 May 2021 05:38), Max: 36.8 (24 May 2021 14:28)  T(F): 98.2 (25 May 2021 05:38), Max: 98.3 (24 May 2021 14:28)  HR: 83 (25 May 2021 05:38) (55 - 83)  BP: 134/63 (25 May 2021 05:38) (129/79 - 136/73)  BP(mean): --  RR: 18 (25 May 2021 05:38) (18 - 18)  SpO2: --    General:[ x  ] NAD, Resting Comfortable,   [   ] other:                                HEENT: [  ] NC/AT, EOMI, PERRL , Normal Conjunctivae,   [  x ] other: legally blind b/l  Cardio: [  ] RRR, no murmur   [  x ] other: known systolic murmur                               Pulm: [  x ] No Respiratory Distress,  Lungs CTAB,   [   ] other:                       Abdomen: [ x  ] ND/NT, Soft,   [   ] other:    : [ x  ] NO VAUGHAN CATHETER, [   ] VAUGHAN CATHETER- no meatal tear, no discharge, [   ] other:                                            MSK: [   ] No joint swelling, Full ROM,   [x  ] other:   appropriate tenderness on left hip. Incision open to air c/d/i, staples in place                                   Ext: [ x  ] No C/C/E, No calf tenderness,   [   ]other:    Skin: [ x  ]intact,   [  ] other:                                                                   Neurological Examination:  Cognitive: [ x  ] AAO x 3,   [    ]  other:                                                                      Attention:  [  x  ] intact,   [    ]  other:                            Memory: [    ] intact,    [    ]  other:     Mood/Affect: [  x  ] wnl,    [    ]  other:                                                                             Communication: [  x ] Fluent, no dysarthria, following commands:  [    ] other:   CN II - XII:  [    ] intact,  [ x   ] other: grossly intact except blind                                                                                        Motor:   RIGHT UE: [ x  ] WNL,  [   ] other:  LEFT    UE: [ x  ] WNL,  [   ] other:  RIGHT LE: [ x  ] WNL,  [   ] other:   LEFT    LE: [   ] WNL,  [  x ] other: hip not testable, o/w 4/5 throughout limited to pain     Tone: [ x   ] wnl,   [    ]  other:  DTRs: [ x  ]symmetric, [   ] other:  Coordination:   [    ] intact,   [    ] other:                                                                           Sensory: [  x  ] Intact to light touch,   [    ] other:                            10.7   8.30  )-----------( 320      ( 24 May 2021 05:53 )             33.7     05-24    141  |  103  |  11  ----------------------------<  96  4.4   |  29  |  0.5<L>    Ca    9.2      24 May 2021 05:53  Mg     2.4     05-24    TPro  5.6<L>  /  Alb  3.5  /  TBili  1.1  /  DBili  x   /  AST  22  /  ALT  9   /  AlkPhos  132<H>  05-24      MEDICATIONS  (STANDING):  enoxaparin Injectable 40 milliGRAM(s) SubCutaneous at bedtime  oxyCODONE    IR 5 milliGRAM(s) Oral <User Schedule>  polyethylene glycol 3350 17 Gram(s) Oral at bedtime  senna 2 Tablet(s) Oral at bedtime    MEDICATIONS  (PRN):  acetaminophen   Tablet .. 650 milliGRAM(s) Oral every 6 hours PRN Temp greater or equal to 38C (100.4F), Mild Pain (1 - 3)  oxyCODONE    IR 5 milliGRAM(s) Oral every 4 hours PRN Moderate Pain (4 - 6)  oxyCODONE    IR 10 milliGRAM(s) Oral every 6 hours PRN Severe Pain (7 - 10)

## 2021-05-25 NOTE — PROGRESS NOTE ADULT - ATTENDING COMMENTS
I examined the patient with the resident and we discussed the findings and treatment plan. Tolerating the rehab program well. I agree with the findings and treatment plan as above, which I modified as needed. The patient continues to require 3 hrs a day of acute inpatient rehab.   Pain is fairly well controlled. Post-op anemia is stable. No other complaints. I examined the patient with the resident and we discussed the findings and treatment plan. Tolerating the rehab program well. I agree with the findings and treatment plan as above, which I modified as needed. The patient continues to require 3 hrs a day of acute inpatient rehab.   Pain is fairly well controlled. Post-op anemia is stable. No other complaints. Incision clean and dry. To remove sutures this week.   Continue rehab program. Plan for discharge home with family when more independent.

## 2021-05-26 RX ORDER — OXYCODONE HYDROCHLORIDE 5 MG/1
5 TABLET ORAL
Refills: 0 | Status: DISCONTINUED | OUTPATIENT
Start: 2021-05-26 | End: 2021-05-28

## 2021-05-26 RX ADMIN — POLYETHYLENE GLYCOL 3350 17 GRAM(S): 17 POWDER, FOR SOLUTION ORAL at 21:28

## 2021-05-26 RX ADMIN — Medication 1 ENEMA: at 18:49

## 2021-05-26 RX ADMIN — OXYCODONE HYDROCHLORIDE 5 MILLIGRAM(S): 5 TABLET ORAL at 10:53

## 2021-05-26 RX ADMIN — SENNA PLUS 2 TABLET(S): 8.6 TABLET ORAL at 21:28

## 2021-05-26 RX ADMIN — OXYCODONE HYDROCHLORIDE 5 MILLIGRAM(S): 5 TABLET ORAL at 18:47

## 2021-05-26 RX ADMIN — OXYCODONE HYDROCHLORIDE 5 MILLIGRAM(S): 5 TABLET ORAL at 07:51

## 2021-05-26 RX ADMIN — ENOXAPARIN SODIUM 40 MILLIGRAM(S): 100 INJECTION SUBCUTANEOUS at 21:28

## 2021-05-26 NOTE — PROGRESS NOTE ADULT - ASSESSMENT
Rehab of left intertrochanteric hip fracture, s/p ORIF/gamma nail.     # Left intertrochanteric hip fx, s/p mechanical fall, s/p left ORIF on 5/14  - Oxycodone q4hrs with breakfast and lunch standing  - WBAT LLE as per ortho  - Incision with staples  plan to remove this week  - Full code       -Pain control: fair control with oxycodone    -GI/Bowel Mgmt: senna, Miralax prunes from home. fleet enema this evening if she has not gone yet       - Diet: DASH, heart healthy    - Legally blind, history of glaucoma.    - Anemia: last Hgb 11  mild, stable, monitor    Precautions / PROPHYLAXIS:    - Falls  - Ortho: Weight bearing status: WBAT on the LLE   - DVT prophylaxis: Lovenox; venous duplex was neg 5/18 for DVT LLE Rehab of left intertrochanteric hip fracture, s/p ORIF/gamma nail.     # Left intertrochanteric hip fx, s/p mechanical fall, s/p left ORIF on 5/14  - Oxycodone q4hrs with breakfast and lunch standing  - WBAT LLE as per ortho  - Incision with staples  plan to remove this week  - Full code  - Making functional gains in therapies       -Pain control: fair control with oxycodone    -GI/Bowel Mgmt: senna, Miralax prunes from home. fleet enema this evening if she has not gone yet     - Diet: DASH, heart healthy    - Legally blind, history of glaucoma.    - Anemia: last Hgb 11  mild, stable, monitor    Precautions / PROPHYLAXIS:    - Falls  - Ortho: Weight bearing status: WBAT on the LLE   - DVT prophylaxis: Lovenox; venous duplex was neg 5/18 for DVT LLE

## 2021-05-26 NOTE — PROGRESS NOTE ADULT - SUBJECTIVE AND OBJECTIVE BOX
Cardiology Patient is a 80y old  Female who presents with a chief complaint of Left trochanteric fracture s/p ORIF (18 May 2021 15:33)    HPI:  80 year old F with PMHx of glaucoma b/l (legally blind) presents to the ED for after a mechanical fall out of her bed around 6:30AM this morning. Pt reports she was trying to reach for something on her dresser and rolled out of bed onto her left side. Patient reported she was unable to get up on her own due to left hip pain. Prior to this event, patient ambulated without assistance. At that time, the patient denied trauma to head, LOC, use of blood thinners, headaches, dizziness, neck pain, back pain, abdominal pain, n/v/d/c, weakness, numbness, tingling, urinary symptoms. Imaging consistent with left trochanteric fracture. Patient underwent left ORIF with a gamma nail on 5/14/21 with Dr. Hirsch. She was cleared for WBAT on the LLE. Patient seen and evaluated by PMR consultant and found to be an appropriate candidate for acute inpatient rehab.      TODAY'S SUBJECTIVE & REVIEW OF SYMPTOMS:   Patient seen and evaluated with attending physician in room. Patient has not had a BM for # days and feels her stool in very hard.   CLOF: Patient ambulated 20' x 2 with RW mod assist, transfers and bed mobility mod assist. 1 step b/l rails x2      Constitutional       [ x  ] WNL           [   ] poor appetite   [   ] insomnia   [   ] tired  Cardio:                [ x  ] WNL            [   ] CP   [   ] LINARES   [   ] palpitations              Resp:                   [x   ] WNL           [   ] SOB   [   ] cough   [   ] wheezing  GI:                        [x   ] WNL          [   ] constipation   [   ] diarrhea   [   ] abdominal pain   [   ] nausea   [   ] emesis                               :                      [  x ] WNL           [   ] VAUGHAN  [   ] dysuria   [   ] difficulty voiding            Endo:                   [ x  ] WNL           [   ] polyuria   [   ] temperature intolerance                Skin:                     [ x  ] WNL          [   ] pain   [   ] wound   [   ] rash  MSK:                    [   ] WNL            [   ] muscle pain   [ x  ] joint pain/ stiffness - left hip   [ x  ] muscle tenderness   [   ] swelling  Neuro:                 [  x ] WNL           [   ] HA   [   ] change in vision   [   ] tremor   [   ] weakness   [   ]dysphagia             Cognitive:             [ x  ] WNL           [   ]confusion     Psych:                  [ x  ] WNL           [   ] hallucinations   [   ]agitation   [   ] delusion   [   ]depression      PHYSICAL EXAM  Vital Signs Last 24 Hrs  T(C): 36.4 (26 May 2021 05:34), Max: 36.7 (25 May 2021 14:14)  T(F): 97.6 (26 May 2021 05:34), Max: 98.1 (25 May 2021 14:14)  HR: 76 (26 May 2021 08:40) (76 - 95)  BP: 126/66 (26 May 2021 08:40) (126/66 - 133/66)  BP(mean): --  RR: 18 (26 May 2021 08:40) (18 - 18)  SpO2: --    General:[ x  ] NAD, Resting Comfortable,   [   ] other:                                HEENT: [  ] NC/AT, EOMI, PERRL , Normal Conjunctivae,   [  x ] other: legally blind b/l  Cardio: [  ] RRR, no murmur   [  x ] other: known systolic murmur                               Pulm: [  x ] No Respiratory Distress,  Lungs CTAB,   [   ] other:                       Abdomen: [ x  ] ND/NT, Soft,   [   ] other:    : [ x  ] NO VAUGHAN CATHETER, [   ] VAUGHAN CATHETER- no meatal tear, no discharge, [   ] other:                                            MSK: [   ] No joint swelling, Full ROM,   [x  ] other:   appropriate tenderness on left hip. Incision open to air c/d/i, staples in place                                   Ext: [ x  ] No C/C/E, No calf tenderness,   [   ]other:    Skin: [ x  ]intact,   [  ] other:                                                                   Neurological Examination:  Cognitive: [ x  ] AAO x 3,   [    ]  other:                                                                      Attention:  [  x  ] intact,   [    ]  other:                            Memory: [    ] intact,    [    ]  other:     Mood/Affect: [  x  ] wnl,    [    ]  other:                                                                             Communication: [  x ] Fluent, no dysarthria, following commands:  [    ] other:   CN II - XII:  [    ] intact,  [ x   ] other: grossly intact except blind                                                                                        Motor:   RIGHT UE: [ x  ] WNL,  [   ] other:  LEFT    UE: [ x  ] WNL,  [   ] other:  RIGHT LE: [ x  ] WNL,  [   ] other:   LEFT    LE: [   ] WNL,  [  x ] other: hip not testable, o/w 4/5 throughout limited to pain     Tone: [ x   ] wnl,   [    ]  other:  DTRs: [ x  ]symmetric, [   ] other:  Coordination:   [    ] intact,   [    ] other:                                                                           Sensory: [  x  ] Intact to light touch,   [    ] other:      MEDICATIONS  (STANDING):  enoxaparin Injectable 40 milliGRAM(s) SubCutaneous at bedtime  oxyCODONE    IR 5 milliGRAM(s) Oral <User Schedule>  polyethylene glycol 3350 17 Gram(s) Oral at bedtime  senna 2 Tablet(s) Oral at bedtime    MEDICATIONS  (PRN):  acetaminophen   Tablet .. 650 milliGRAM(s) Oral every 6 hours PRN Temp greater or equal to 38C (100.4F), Mild Pain (1 - 3)             Patient is a 80y old  Female who presents with a chief complaint of Left trochanteric fracture s/p ORIF (18 May 2021 15:33)    HPI:  80 year old F with PMHx of glaucoma b/l (legally blind) presents to the ED for after a mechanical fall out of her bed around 6:30AM this morning. Pt reports she was trying to reach for something on her dresser and rolled out of bed onto her left side. Patient reported she was unable to get up on her own due to left hip pain. Prior to this event, patient ambulated without assistance. At that time, the patient denied trauma to head, LOC, use of blood thinners, headaches, dizziness, neck pain, back pain, abdominal pain, n/v/d/c, weakness, numbness, tingling, urinary symptoms. Imaging consistent with left trochanteric fracture. Patient underwent left ORIF with a gamma nail on 5/14/21 with Dr. Hirsch. She was cleared for WBAT on the LLE. Patient seen and evaluated by PMR consultant and found to be an appropriate candidate for acute inpatient rehab.      TODAY'S SUBJECTIVE & REVIEW OF SYMPTOMS:   Patient seen and evaluated with attending physician in room. Patient has not had a BM for # days and feels her stool in very hard.   CLOF: Patient ambulated 20' x 2 with RW mod assist, transfers and bed mobility mod assist. 1 step b/l rails x2      Constitutional       [ x  ] WNL           [   ] poor appetite   [   ] insomnia   [   ] tired  Cardio:                [ x  ] WNL            [   ] CP   [   ] LINARES   [   ] palpitations              Resp:                   [x   ] WNL           [   ] SOB   [   ] cough   [   ] wheezing  GI:                        [   ] WNL          [x   ] constipation   [   ] diarrhea   [   ] abdominal pain   [   ] nausea   [   ] emesis                               :                      [  x ] WNL           [   ] VAUGHAN  [   ] dysuria   [   ] difficulty voiding            Endo:                   [ x  ] WNL           [   ] polyuria   [   ] temperature intolerance                Skin:                     [ x  ] WNL          [   ] pain   [   ] wound   [   ] rash  MSK:                    [   ] WNL            [   ] muscle pain   [ x  ] joint pain/ stiffness - left hip   [ x  ] muscle tenderness   [   ] swelling  Neuro:                 [  x ] WNL           [   ] HA   [   ] change in vision   [   ] tremor   [   ] weakness   [   ]dysphagia             Cognitive:             [ x  ] WNL           [   ]confusion     Psych:                  [ x  ] WNL           [   ] hallucinations   [   ]agitation   [   ] delusion   [   ]depression      PHYSICAL EXAM  Vital Signs Last 24 Hrs  T(C): 36.4 (26 May 2021 05:34), Max: 36.7 (25 May 2021 14:14)  T(F): 97.6 (26 May 2021 05:34), Max: 98.1 (25 May 2021 14:14)  HR: 76 (26 May 2021 08:40) (76 - 95)  BP: 126/66 (26 May 2021 08:40) (126/66 - 133/66)  BP(mean): --  RR: 18 (26 May 2021 08:40) (18 - 18)  SpO2: --    General:[ x  ] NAD, Resting Comfortable,   [   ] other:                                HEENT: [  ] NC/AT, EOMI, PERRL , Normal Conjunctivae,   [  x ] other: blind b/l  Cardio: [  ] RRR, no murmur   [  x ] other: known systolic murmur                               Pulm: [  x ] No Respiratory Distress,  Lungs CTAB,   [   ] other:                       Abdomen: [ x  ] ND/NT, Soft,   [   ] other:    : [ x  ] NO VAUGHAN CATHETER, [   ] VAUGHAN CATHETER- no meatal tear, no discharge, [   ] other:                                            MSK: [   ] No joint swelling, Full ROM,   [x  ] other:   appropriate tenderness on left hip. Incision open to air c/d/i, staples in place                                   Ext: [ x  ] No C/C/E, No calf tenderness,   [   ]other:    Skin: [ x  ]intact,   [  ] other:                                                                   Neurological Examination:  Cognitive: [ x  ] AAO x 3,   [    ]  other:                                                                      Attention:  [  x  ] intact,   [    ]  other:                            Memory: [    ] intact,    [    ]  other:     Mood/Affect: [  x  ] wnl,    [    ]  other:                                                                             Communication: [  x ] Fluent, no dysarthria, following commands:  [    ] other:   CN II - XII:  [    ] intact,  [ x   ] other: grossly intact except blind                                                                                        Motor:   RIGHT UE: [ x  ] WNL,  [   ] other:  LEFT    UE: [ x  ] WNL,  [   ] other:  RIGHT LE: [ x  ] WNL,  [   ] other:   LEFT    LE: [   ] WNL,  [  x ] other: hip not testable, o/w 4/5 throughout limited to pain     Tone: [ x   ] wnl,   [    ]  other:  DTRs: [ x  ]symmetric, [   ] other:  Coordination:   [    ] intact,   [    ] other:                                                                           Sensory: [  x  ] Intact to light touch,   [    ] other:      MEDICATIONS  (STANDING):  enoxaparin Injectable 40 milliGRAM(s) SubCutaneous at bedtime  oxyCODONE    IR 5 milliGRAM(s) Oral <User Schedule>  polyethylene glycol 3350 17 Gram(s) Oral at bedtime  senna 2 Tablet(s) Oral at bedtime    MEDICATIONS  (PRN):  acetaminophen   Tablet .. 650 milliGRAM(s) Oral every 6 hours PRN Temp greater or equal to 38C (100.4F), Mild Pain (1 - 3)

## 2021-05-26 NOTE — PROGRESS NOTE ADULT - ATTENDING COMMENTS
I examined the patient with the resident and we discussed the findings and treatment plan. Tolerating the rehab program well. I agree with the findings and treatment plan as above, which I modified as needed. The patient continues to require 3 hrs a day of acute inpatient rehab.   Pain is fairly well controlled. Post-op anemia is stable. No other complaints. Incision clean and dry. To remove sutures this week.   Continue rehab program. Plan for discharge home with family when more independent. I examined the patient with the resident and we discussed the findings and treatment plan. Tolerating the rehab program well. I agree with the findings and treatment plan as above, which I modified as needed. The patient continues to require 3 hrs a day of acute inpatient rehab.     Pain is fairly well controlled. Post-op anemia is stable. No other complaints. Incision clean and dry. To remove sutures this week.   C/O constipation. To receive enema tonight if no BM.    Continue rehab program. Plan for discharge home with family when more independent.

## 2021-05-27 RX ORDER — OXYCODONE HYDROCHLORIDE 5 MG/1
5 TABLET ORAL EVERY 4 HOURS
Refills: 0 | Status: DISCONTINUED | OUTPATIENT
Start: 2021-05-27 | End: 2021-06-02

## 2021-05-27 RX ADMIN — POLYETHYLENE GLYCOL 3350 17 GRAM(S): 17 POWDER, FOR SOLUTION ORAL at 21:55

## 2021-05-27 RX ADMIN — OXYCODONE HYDROCHLORIDE 5 MILLIGRAM(S): 5 TABLET ORAL at 16:45

## 2021-05-27 RX ADMIN — SENNA PLUS 2 TABLET(S): 8.6 TABLET ORAL at 21:55

## 2021-05-27 RX ADMIN — ENOXAPARIN SODIUM 40 MILLIGRAM(S): 100 INJECTION SUBCUTANEOUS at 21:55

## 2021-05-27 RX ADMIN — OXYCODONE HYDROCHLORIDE 5 MILLIGRAM(S): 5 TABLET ORAL at 12:32

## 2021-05-27 NOTE — PROGRESS NOTE ADULT - ASSESSMENT
Rehab of left intertrochanteric hip fracture, s/p ORIF/gamma nail.     # Left intertrochanteric hip fx, s/p mechanical fall, s/p left ORIF on 5/14  - Oxycodone q4hrs with breakfast and lunch standing  - WBAT LLE as per ortho  - Incision with staples  plan to remove this week  - Full code  - Making functional gains in therapies     -Pain control: fair control with oxycodone    -GI/Bowel Mgmt: senna, Miralax prunes from home.     - Diet: DASH, heart healthy    - Legally blind, history of glaucoma.    - Anemia:   mild, stable, monitor    Precautions / PROPHYLAXIS:    - Falls  - Ortho: Weight bearing status: WBAT on the LLE   - DVT prophylaxis: Lovenox; venous duplex was neg 5/18 for DVT LLE

## 2021-05-27 NOTE — PROGRESS NOTE ADULT - SUBJECTIVE AND OBJECTIVE BOX
Patient is a 80y old  Female who presents with a chief complaint of Left trochanteric fracture s/p ORIF (18 May 2021 15:33)    HPI:  80 year old F with PMHx of glaucoma b/l (legally blind) presents to the ED for after a mechanical fall out of her bed around 6:30AM this morning. Pt reports she was trying to reach for something on her dresser and rolled out of bed onto her left side. Patient reported she was unable to get up on her own due to left hip pain. Prior to this event, patient ambulated without assistance. At that time, the patient denied trauma to head, LOC, use of blood thinners, headaches, dizziness, neck pain, back pain, abdominal pain, n/v/d/c, weakness, numbness, tingling, urinary symptoms. Imaging consistent with left trochanteric fracture. Patient underwent left ORIF with a gamma nail on 5/14/21 with Dr. Hirsch. She was cleared for WBAT on the LLE. Patient seen and evaluated by PMR consultant and found to be an appropriate candidate for acute inpatient rehab.      TODAY'S SUBJECTIVE & REVIEW OF SYMPTOMS:   Patient seen and evaluated with attending physician in room. Patient had BM after enema yesterday.  she felt greatly relieved.  she had several other BM throughout night.  She does not want to take pain medication and get constipated again.  She was reassured that she has other medications on board now to prevent that but pt wants to wean down regardless.  physcian encouraged pt to see how she does without it, and take if if needed for therapy.   CLOF: Patient ambulated 20' x 2 with RW mod assist, transfers and bed mobility mod assist. 1 step b/l rails x2      Constitutional       [ x  ] WNL           [   ] poor appetite   [   ] insomnia   [   ] tired  Cardio:                [ x  ] WNL            [   ] CP   [   ] LINARES   [   ] palpitations              Resp:                   [x   ] WNL           [   ] SOB   [   ] cough   [   ] wheezing  GI:                        [x   ] WNL          [   ] constipation   [   ] diarrhea   [   ] abdominal pain   [   ] nausea   [   ] emesis                               :                      [  x ] WNL           [   ] VAUGHAN  [   ] dysuria   [   ] difficulty voiding            Endo:                   [ x  ] WNL           [   ] polyuria   [   ] temperature intolerance                Skin:                     [ x  ] WNL          [   ] pain   [   ] wound   [   ] rash  MSK:                    [   ] WNL            [   ] muscle pain   [ x  ] joint pain/ stiffness - left hip   [ x  ] muscle tenderness   [   ] swelling  Neuro:                 [  x ] WNL           [   ] HA   [   ] change in vision   [   ] tremor   [   ] weakness   [   ]dysphagia             Cognitive:             [ x  ] WNL           [   ]confusion     Psych:                  [ x  ] WNL           [   ] hallucinations   [   ]agitation   [   ] delusion   [   ]depression      PHYSICAL EXAM  Vital Signs Last 24 Hrs  T(C): 36.4 (27 May 2021 05:05), Max: 36.9 (26 May 2021 20:46)  T(F): 97.6 (27 May 2021 05:05), Max: 98.5 (26 May 2021 20:46)  HR: 77 (27 May 2021 05:05) (74 - 77)  BP: 137/70 (27 May 2021 05:05) (118/66 - 137/70)  BP(mean): --  RR: 18 (27 May 2021 05:05) (18 - 18)  SpO2: --  General:[ x  ] NAD, Resting Comfortable,   [   ] other:                                HEENT: [  ] NC/AT, EOMI, PERRL , Normal Conjunctivae,   [  x ] other: blind b/l  Cardio: [  ] RRR, no murmur   [  x ] other: known systolic murmur                               Pulm: [  x ] No Respiratory Distress,  Lungs CTAB,   [   ] other:                       Abdomen: [ x  ] ND/NT, Soft,   [   ] other:    : [ x  ] NO VAUGHAN CATHETER, [   ] VAUGHAN CATHETER- no meatal tear, no discharge, [   ] other:                                            MSK: [   ] No joint swelling, Full ROM,   [x  ] other:   appropriate tenderness on left hip. Incision open to air c/d/i, staples in place                                   Ext: [ x  ] No C/C/E, No calf tenderness,   [   ]other:    Skin: [ x  ]intact,   [  ] other:                                                                   Neurological Examination:  Cognitive: [ x  ] AAO x 3,   [    ]  other:                                                                      Attention:  [  x  ] intact,   [    ]  other:                            Memory: [  x  ] intact,    [    ]  other:     Mood/Affect: [  x  ] wnl,    [    ]  other:                                                                             Communication: [  x ] Fluent, no dysarthria, following commands:  [    ] other:   CN II - XII:  [    ] intact,  [ x   ] other: grossly intact except blind                                                                                        Motor:   RIGHT UE: [ x  ] WNL,  [   ] other:  LEFT    UE: [ x  ] WNL,  [   ] other:  RIGHT LE: [ x  ] WNL,  [   ] other:   LEFT    LE: [   ] WNL,  [  x ] other: hip not testable, o/w 4/5 throughout limited to pain     Tone: [ x   ] wnl,   [    ]  other:  DTRs: [ x  ]symmetric, [   ] other:  Coordination:   [    ] intact,   [    ] other:                                                                           Sensory: [  x  ] Intact to light touch,   [    ] other:      MEDICATIONS  (STANDING):  enoxaparin Injectable 40 milliGRAM(s) SubCutaneous at bedtime  oxyCODONE    IR 5 milliGRAM(s) Oral <User Schedule>  polyethylene glycol 3350 17 Gram(s) Oral at bedtime  senna 2 Tablet(s) Oral at bedtime    MEDICATIONS  (PRN):  acetaminophen   Tablet .. 650 milliGRAM(s) Oral every 6 hours PRN Temp greater or equal to 38C (100.4F), Mild Pain (1 - 3)               Patient is a 80y old  Female who presents with a chief complaint of Left trochanteric fracture s/p ORIF (18 May 2021 15:33)    HPI:  80 year old F with PMHx of glaucoma b/l (legally blind) presents to the ED for after a mechanical fall out of her bed around 6:30AM this morning. Pt reports she was trying to reach for something on her dresser and rolled out of bed onto her left side. Patient reported she was unable to get up on her own due to left hip pain. Prior to this event, patient ambulated without assistance. At that time, the patient denied trauma to head, LOC, use of blood thinners, headaches, dizziness, neck pain, back pain, abdominal pain, n/v/d/c, weakness, numbness, tingling, urinary symptoms. Imaging consistent with left trochanteric fracture. Patient underwent left ORIF with a gamma nail on 5/14/21 with Dr. Hirsch. She was cleared for WBAT on the LLE. Patient seen and evaluated by PMR consultant and found to be an appropriate candidate for acute inpatient rehab.      TODAY'S SUBJECTIVE & REVIEW OF SYMPTOMS:   Patient seen and evaluated with attending physician in room. Patient had BM after enema yesterday.  she felt greatly relieved.  she had several other BM throughout night.  She does not want to take pain medication and get constipated again.  She was reassured that she has other medications on board now to prevent that but pt wants to wean down regardless.  physcian encouraged pt to see how she does without it, and take if if needed for therapy.   CLOF: Patient ambulated 20' x 2 with RW mod assist, transfers and bed mobility mod assist. 1 step b/l rails x2      Constitutional       [ x  ] WNL           [   ] poor appetite   [   ] insomnia   [   ] tired  Cardio:                [ x  ] WNL            [   ] CP   [   ] LINARES   [   ] palpitations              Resp:                   [x   ] WNL           [   ] SOB   [   ] cough   [   ] wheezing  GI:                        [x   ] WNL          [   ] constipation   [   ] diarrhea   [   ] abdominal pain   [   ] nausea   [   ] emesis                               :                      [  x ] WNL           [   ] VAUGHAN  [   ] dysuria   [   ] difficulty voiding            Endo:                   [ x  ] WNL           [   ] polyuria   [   ] temperature intolerance                Skin:                     [ x  ] WNL          [   ] pain   [   ] wound   [   ] rash  MSK:                    [   ] WNL            [   ] muscle pain   [ x  ] joint pain/ stiffness - left hip   [ x  ] muscle tenderness   [   ] swelling  Neuro:                 [  x ] WNL           [   ] HA   [   ] change in vision   [   ] tremor   [   ] weakness   [   ]dysphagia             Cognitive:             [ x  ] WNL           [   ]confusion     Psych:                  [ x  ] WNL           [   ] hallucinations   [   ]agitation   [   ] delusion   [   ]depression      PHYSICAL EXAM  Vital Signs Last 24 Hrs  T(C): 36.4 (27 May 2021 05:05), Max: 36.9 (26 May 2021 20:46)  T(F): 97.6 (27 May 2021 05:05), Max: 98.5 (26 May 2021 20:46)  HR: 77 (27 May 2021 05:05) (74 - 77)  BP: 137/70 (27 May 2021 05:05) (118/66 - 137/70)  BP(mean): --  RR: 18 (27 May 2021 05:05) (18 - 18)  SpO2: --  General:[ x  ] NAD, Resting Comfortable,   [   ] other:                                HEENT: [  ] NC/AT, EOMI, PERRL , Normal Conjunctivae,   [  x ] other: blind b/l  Cardio: [  ] RRR, no murmur   [  x ] other: known systolic murmur                               Pulm: [  x ] No Respiratory Distress,  Lungs CTAB,   [   ] other:                       Abdomen: [ x  ] ND/NT, Soft,   [   ] other:    : [ x  ] NO VAUGHAN CATHETER, [   ] VAUGHAN CATHETER- no meatal tear, no discharge, [   ] other:                                            MSK: [   ] No joint swelling, Full ROM,   [x  ] other:   appropriate tenderness on left hip. Incision open to air c/d/i, staples in place                                   Ext: [ x  ] No C/C/E, No calf tenderness,   [   ]other:    Skin: [ x  ]intact,   [  ] other:                                                                   Neurological Examination:  Cognitive: [ x  ] AAO x 3,   [    ]  other:                                                                      Attention:  [  x  ] intact,   [    ]  other:                            Memory: [  x  ] intact,    [    ]  other:     Mood/Affect: [  x  ] wnl,    [    ]  other:                                                                             Communication: [  x ] Fluent, no dysarthria, following commands:  [    ] other:   CN II - XII:  [    ] intact,  [ x   ] other: grossly intact except blind                                                                                        Motor:   RIGHT UE: [ x  ] WNL,  [   ] other:  LEFT    UE: [ x  ] WNL,  [   ] other:  RIGHT LE: [ x  ] WNL,  [   ] other:   LEFT    LE: [   ] WNL,  [  x ] other: hip not testable, o/w 4/5 throughout limited to pain     Tone: [ x   ] wnl,   [    ]  other:  DTRs: [ x  ]symmetric, [   ] other:  Coordination:   [   x ] intact,   [    ] other:                                                                           Sensory: [  x  ] Intact to light touch,   [    ] other:      MEDICATIONS  (STANDING):  enoxaparin Injectable 40 milliGRAM(s) SubCutaneous at bedtime  oxyCODONE    IR 5 milliGRAM(s) Oral <User Schedule>  polyethylene glycol 3350 17 Gram(s) Oral at bedtime  senna 2 Tablet(s) Oral at bedtime    MEDICATIONS  (PRN):  acetaminophen   Tablet .. 650 milliGRAM(s) Oral every 6 hours PRN Temp greater or equal to 38C (100.4F), Mild Pain (1 - 3)

## 2021-05-27 NOTE — PROGRESS NOTE ADULT - ATTENDING COMMENTS
I examined the patient with the resident and we discussed the findings and treatment plan. Tolerating the rehab program well. I agree with the findings and treatment plan as above, which I modified as needed. The patient continues to require 3 hrs a day of acute inpatient rehab.     Pain is fairly well controlled. Post-op anemia is stable. No other complaints. Incision clean and dry. To remove sutures this week.   C/O constipation. To receive enema tonight if no BM.    Continue rehab program. Plan for discharge home with family when more independent. I examined the patient with the resident and we discussed the findings and treatment plan. Tolerating the rehab program well. I agree with the findings and treatment plan as above, which I modified as needed. The patient continues to require 3 hrs a day of acute inpatient rehab.     Pain is fairly well controlled. Post-op anemia is stable. No other complaints. Incision clean and dry. To remove sutures this week.   Had large BM after enema yesterday with relief. Denies abdominal pain. Hip pain and ambulation improving.     Continue rehab program. Plan for discharge home with family when more independent.

## 2021-05-27 NOTE — CHART NOTE - NSCHARTNOTEFT_GEN_A_CORE
#Rehab of left intertrochanteric hip fracture, s/p ORIF/gamma nail.  - Anemia-- mild, stable, monitor    Pt reports 100% PO intake/appetite. Had oatmeal, banana and orange juice for BF today. no chewing/swallowing difficulty. no food preferences mentioned. No GI discomfort, LBM 5/26 per pt. AAOX4; No NFPF, skin: L hip surg incision; no edema noted. Labs reviewed; meds reviewed. No new wts in EMR.     Recommendations:   1. Continue with current diet order.   2. Obtain new wts     [phone]  pt not @ risk, RD to f/u in 7 days.   RD remains available: Rosemary Lutz [phone]

## 2021-05-28 ENCOUNTER — TRANSCRIPTION ENCOUNTER (OUTPATIENT)
Age: 81
End: 2021-05-28

## 2021-05-28 RX ORDER — ENOXAPARIN SODIUM 100 MG/ML
40 INJECTION SUBCUTANEOUS
Qty: 0 | Refills: 0 | DISCHARGE
Start: 2021-05-28

## 2021-05-28 RX ORDER — ACETAMINOPHEN 500 MG
2 TABLET ORAL
Qty: 0 | Refills: 0 | DISCHARGE
Start: 2021-05-28

## 2021-05-28 RX ORDER — POLYETHYLENE GLYCOL 3350 17 G/17G
17 POWDER, FOR SOLUTION ORAL DAILY
Refills: 0 | Status: DISCONTINUED | OUTPATIENT
Start: 2021-05-28 | End: 2021-06-02

## 2021-05-28 RX ADMIN — ENOXAPARIN SODIUM 40 MILLIGRAM(S): 100 INJECTION SUBCUTANEOUS at 21:18

## 2021-05-28 RX ADMIN — SENNA PLUS 2 TABLET(S): 8.6 TABLET ORAL at 21:18

## 2021-05-28 RX ADMIN — OXYCODONE HYDROCHLORIDE 5 MILLIGRAM(S): 5 TABLET ORAL at 17:08

## 2021-05-28 RX ADMIN — OXYCODONE HYDROCHLORIDE 5 MILLIGRAM(S): 5 TABLET ORAL at 16:18

## 2021-05-28 RX ADMIN — POLYETHYLENE GLYCOL 3350 17 GRAM(S): 17 POWDER, FOR SOLUTION ORAL at 19:58

## 2021-05-28 NOTE — DISCHARGE NOTE PROVIDER - NSDCMRMEDTOKEN_GEN_ALL_CORE_FT
acetaminophen 325 mg oral tablet: 2 tab(s) orally every 6 hours, As needed, Temp greater or equal to 38C (100.4F), Mild Pain (1 - 3)  enoxaparin: 40 milligram(s) subcutaneous once a day  oxyCODONE 10 mg oral tablet: 1 tab(s) orally every 6 hours, As needed, Moderate Pain (4 - 6)  oxyCODONE 5 mg oral tablet: 1 tab(s) orally every 4 hours, As needed, Mild Pain (1 - 3)  polyethylene glycol 3350 oral powder for reconstitution: 17 gram(s) orally once a day (at bedtime)  senna oral tablet: 2 tab(s) orally once a day (at bedtime)   acetaminophen 325 mg oral tablet: 2 tab(s) orally every 6 hours, As needed, Temp greater or equal to 38C (100.4F), Mild Pain (1 - 3)  Ecotrin Adult Low Strength 81 mg oral delayed release tablet: 1 tab(s) orally 2 times a day, take for 4 weeks for dvt prophylaxis   omeprazole 40 mg oral delayed release capsule: 1 cap(s) orally once a day , while on baby aspirin / ecotrin   oxyCODONE 5 mg oral tablet: 1 tab(s) orally every 8 hours, As Needed severe  Pain (1 - 3) MDD:3

## 2021-05-28 NOTE — DISCHARGE NOTE PROVIDER - NSDCCPCAREPLAN_GEN_ALL_CORE_FT
PRINCIPAL DISCHARGE DIAGNOSIS  Diagnosis: Fracture of hip  Assessment and Plan of Treatment: of left , underwent  ORIF  on  5////21,  [facility] physical therapy and  follow up with  ortho dr Joycelyn simmons 2 weeks , staples removed  after 14 days , incision looks good      SECONDARY DISCHARGE DIAGNOSES  Diagnosis: H/O aortic valve stenosis  Assessment and Plan of Treatment: please follow up with your cardiologist TAM Kearns in  few weeks , continue meds and diet control     PRINCIPAL DISCHARGE DIAGNOSIS  Diagnosis: Fracture of hip  Assessment and Plan of Treatment: of left , underwent  ORIF  on  5/21/21,  please continue physical therapy and  follow up with  ortho dr Joycelyn simmons 2 weeks , staples removed  after 14 days , incision looks good, please continue home  physical therapy, weight bearing as tolerated      SECONDARY DISCHARGE DIAGNOSES  Diagnosis: H/O aortic valve stenosis  Assessment and Plan of Treatment: please follow up with your cardiologist TAM Kearns in  few weeks , continue meds and diet control

## 2021-05-28 NOTE — PROGRESS NOTE ADULT - SUBJECTIVE AND OBJECTIVE BOX
Patient is a 80y old  Female who presents with a chief complaint of Left trochanteric fracture s/p ORIF (18 May 2021 15:33)    HPI:  80 year old F with PMHx of glaucoma b/l (legally blind) presents to the ED for after a mechanical fall out of her bed around 6:30AM this morning. Pt reports she was trying to reach for something on her dresser and rolled out of bed onto her left side. Patient reported she was unable to get up on her own due to left hip pain. Prior to this event, patient ambulated without assistance. At that time, the patient denied trauma to head, LOC, use of blood thinners, headaches, dizziness, neck pain, back pain, abdominal pain, n/v/d/c, weakness, numbness, tingling, urinary symptoms. Imaging consistent with left trochanteric fracture. Patient underwent left ORIF with a gamma nail on 5/14/21 with Dr. Hirsch. She was cleared for WBAT on the LLE. Patient seen and evaluated by PMR consultant and found to be an appropriate candidate for acute inpatient rehab.      TODAY'S SUBJECTIVE & REVIEW OF SYMPTOMS:   Patient seen and evaluated with attending physician in room. Patient reports no new complaints, and again expresses gratitude and relief over having BMs.   CLOF: Patient ambulated 20' x 2 with RW mod assist, transfers and bed mobility mod assist. 1 step b/l rails x2      Constitutional       [ x  ] WNL           [   ] poor appetite   [   ] insomnia   [   ] tired  Cardio:                [ x  ] WNL            [   ] CP   [   ] LINARES   [   ] palpitations              Resp:                   [x   ] WNL           [   ] SOB   [   ] cough   [   ] wheezing  GI:                        [x   ] WNL          [   ] constipation   [   ] diarrhea   [   ] abdominal pain   [   ] nausea   [   ] emesis                               :                      [  x ] WNL           [   ] VAUGHAN  [   ] dysuria   [   ] difficulty voiding            Endo:                   [ x  ] WNL           [   ] polyuria   [   ] temperature intolerance                Skin:                     [ x  ] WNL          [   ] pain   [   ] wound   [   ] rash  MSK:                    [   ] WNL            [   ] muscle pain   [ x  ] joint pain/ stiffness - left hip   [ x  ] muscle tenderness   [   ] swelling  Neuro:                 [  x ] WNL           [   ] HA   [   ] change in vision   [   ] tremor   [   ] weakness   [   ]dysphagia             Cognitive:             [ x  ] WNL           [   ]confusion     Psych:                  [ x  ] WNL           [   ] hallucinations   [   ]agitation   [   ] delusion   [   ]depression      PHYSICAL EXAM  Vital Signs Last 24 Hrs  T(C): 36.2 (28 May 2021 05:35), Max: 36.7 (27 May 2021 13:32)  T(F): 97.1 (28 May 2021 05:35), Max: 98 (27 May 2021 13:32)  HR: 63 (28 May 2021 05:35) (63 - 80)  BP: 162/78 (28 May 2021 05:35) (128/62 - 162/78)  BP(mean): --  RR: 20 (28 May 2021 05:35) (18 - 20)  SpO2: 98% (27 May 2021 21:41) (98% - 98%)  General:[ x  ] NAD, Resting Comfortable,   [   ] other:                                HEENT: [  ] NC/AT, EOMI, PERRL , Normal Conjunctivae,   [  x ] other: blind b/l  Cardio: [  ] RRR, no murmur   [  x ] other: known systolic murmur                               Pulm: [  x ] No Respiratory Distress,  Lungs CTAB,   [   ] other:                       Abdomen: [ x  ] ND/NT, Soft,   [   ] other:    : [ x  ] NO VAUGHAN CATHETER, [   ] VAUGHAN CATHETER- no meatal tear, no discharge, [   ] other:                                            MSK: [   ] No joint swelling, Full ROM,   [x  ] other:   appropriate tenderness on left hip. Incision open to air c/d/i, staples in place                                   Ext: [ x  ] No C/C/E, No calf tenderness,   [   ]other:    Skin: [ x  ]intact,   [  ] other:                                                                   Neurological Examination:  Cognitive: [ x  ] AAO x 3,   [    ]  other:                                                                      Attention:  [  x  ] intact,   [    ]  other:                            Memory: [  x  ] intact,    [    ]  other:     Mood/Affect: [  x  ] wnl,    [    ]  other:                                                                             Communication: [  x ] Fluent, no dysarthria, following commands:  [    ] other:   CN II - XII:  [    ] intact,  [ x   ] other: grossly intact except blind                                                                                        Motor:   RIGHT UE: [ x  ] WNL,  [   ] other:  LEFT    UE: [ x  ] WNL,  [   ] other:  RIGHT LE: [ x  ] WNL,  [   ] other:   LEFT    LE: [   ] WNL,  [  x ] other: hip not testable, o/w 4/5 throughout limited to pain     Tone: [ x   ] wnl,   [    ]  other:  DTRs: [ x  ]symmetric, [   ] other:  Coordination:   [   x ] intact,   [    ] other:                                                                           Sensory: [  x  ] Intact to light touch,   [    ] other:      MEDICATIONS  (STANDING):  enoxaparin Injectable 40 milliGRAM(s) SubCutaneous at bedtime  oxyCODONE    IR 5 milliGRAM(s) Oral <User Schedule>  polyethylene glycol 3350 17 Gram(s) Oral at bedtime  senna 2 Tablet(s) Oral at bedtime    MEDICATIONS  (PRN):  acetaminophen   Tablet .. 650 milliGRAM(s) Oral every 6 hours PRN Temp greater or equal to 38C (100.4F), Mild Pain (1 - 3)  oxyCODONE    IR 5 milliGRAM(s) Oral every 4 hours PRN Moderate Pain (4 - 6)               Patient is a 80y old  Female who presents with a chief complaint of Left trochanteric fracture s/p ORIF (18 May 2021 15:33)    HPI:  80 year old F with PMHx of glaucoma b/l (legally blind) presents to the ED for after a mechanical fall out of her bed around 6:30AM this morning. Pt reports she was trying to reach for something on her dresser and rolled out of bed onto her left side. Patient reported she was unable to get up on her own due to left hip pain. Prior to this event, patient ambulated without assistance. At that time, the patient denied trauma to head, LOC, use of blood thinners, headaches, dizziness, neck pain, back pain, abdominal pain, n/v/d/c, weakness, numbness, tingling, urinary symptoms. Imaging consistent with left trochanteric fracture. Patient underwent left ORIF with a gamma nail on 5/14/21 with Dr. Hirsch. She was cleared for WBAT on the LLE. Patient seen and evaluated by PMR consultant and found to be an appropriate candidate for acute inpatient rehab.      TODAY'S SUBJECTIVE & REVIEW OF SYMPTOMS:   Patient seen and evaluated with attending physician in room. Patient reports no new complaints, and again expresses gratitude and relief over having BMs.   CLOF: Patient ambulated 20' x 2 with RW mod assist, transfers and bed mobility mod assist. 1 step b/l rails x2      Constitutional       [ x  ] WNL           [   ] poor appetite   [   ] insomnia   [   ] tired  Cardio:                [ x  ] WNL            [   ] CP   [   ] LINARES   [   ] palpitations              Resp:                   [x   ] WNL           [   ] SOB   [   ] cough   [   ] wheezing  GI:                        [x   ] WNL          [   ] constipation   [   ] diarrhea   [   ] abdominal pain   [   ] nausea   [   ] emesis                               :                      [  x ] WNL           [   ] VAUGHAN  [   ] dysuria   [   ] difficulty voiding            Endo:                   [ x  ] WNL           [   ] polyuria   [   ] temperature intolerance                Skin:                     [ x  ] WNL          [   ] pain   [   ] wound   [   ] rash  MSK:                    [   ] WNL            [   ] muscle pain   [ x  ] joint pain/ stiffness - left hip   [ x  ] muscle tenderness   [   ] swelling  Neuro:                 [  x ] WNL           [   ] HA   [   ] change in vision   [   ] tremor   [   ] weakness   [   ]dysphagia             Cognitive:             [ x  ] WNL           [   ]confusion     Psych:                  [ x  ] WNL           [   ] hallucinations   [   ]agitation   [   ] delusion   [   ]depression      PHYSICAL EXAM  Vital Signs Last 24 Hrs  T(C): 36.2 (28 May 2021 05:35), Max: 36.7 (27 May 2021 13:32)  T(F): 97.1 (28 May 2021 05:35), Max: 98 (27 May 2021 13:32)  HR: 63 (28 May 2021 05:35) (63 - 80)  BP: 162/78 (28 May 2021 05:35) (128/62 - 162/78)  BP(mean): --  RR: 20 (28 May 2021 05:35) (18 - 20)  SpO2: 98% (27 May 2021 21:41) (98% - 98%)    General:[ x  ] NAD, Resting Comfortable,   [   ] other:                                HEENT: [  ] NC/AT, EOMI, PERRL , Normal Conjunctivae,   [  x ] other: blind b/l  Cardio: [  ] RRR, no murmur   [  x ] other: known systolic murmur                               Pulm: [  x ] No Respiratory Distress,  Lungs CTAB,   [   ] other:                       Abdomen: [ x  ] ND/NT, Soft,   [   ] other:    : [ x  ] NO VAUGHAN CATHETER, [   ] VAUGHAN CATHETER- no meatal tear, no discharge, [   ] other:                                            MSK: [   ] No joint swelling, Full ROM,   [x  ] other:   appropriate tenderness on left hip. Incision open to air c/d/i, staples in place                                   Ext: [ x  ] No C/C/E, No calf tenderness,   [   ]other:    Skin: [ x  ]intact,   [  ] other:                                                                   Neurological Examination:  Cognitive: [ x  ] AAO x 3,   [    ]  other:                                                                      Attention:  [  x  ] intact,   [    ]  other:                            Memory: [  x  ] intact,    [    ]  other:     Mood/Affect: [  x  ] wnl,    [    ]  other:                                                                             Communication: [  x ] Fluent, no dysarthria, following commands:  [    ] other:   CN II - XII:  [    ] intact,  [ x   ] other: grossly intact except blind                                                                                        Motor:   RIGHT UE: [ x  ] WNL,  [   ] other:  LEFT    UE: [ x  ] WNL,  [   ] other:  RIGHT LE: [ x  ] WNL,  [   ] other:   LEFT    LE: [   ] WNL,  [  x ] other: hip not testable, o/w 4/5 throughout limited to pain     Tone: [ x   ] wnl,   [    ]  other:  DTRs: [ x  ]symmetric, [   ] other:  Coordination:   [   x ] intact,   [    ] other:                                                                           Sensory: [  x  ] Intact to light touch,   [    ] other:      MEDICATIONS  (STANDING):  enoxaparin Injectable 40 milliGRAM(s) SubCutaneous at bedtime  oxyCODONE    IR 5 milliGRAM(s) Oral <User Schedule>  polyethylene glycol 3350 17 Gram(s) Oral at bedtime  senna 2 Tablet(s) Oral at bedtime    MEDICATIONS  (PRN):  acetaminophen   Tablet .. 650 milliGRAM(s) Oral every 6 hours PRN Temp greater or equal to 38C (100.4F), Mild Pain (1 - 3)  oxyCODONE    IR 5 milliGRAM(s) Oral every 4 hours PRN Moderate Pain (4 - 6)

## 2021-05-28 NOTE — DISCHARGE NOTE PROVIDER - CARE PROVIDER_API CALL
Donnie Herman  CARDIOVASCULAR DISEASE  [address] [facility] [address]  [place] [address]  Phone: [phone]  Fax: [phone]  Follow Up Time:     Meng Hirsch  ORTHOPAEDIC SURGERY  [facility] [place] [address]  Phone: [phone]  Fax: [phone]  Follow Up Time:

## 2021-05-28 NOTE — PROGRESS NOTE ADULT - ASSESSMENT
Rehab of left intertrochanteric hip fracture, s/p ORIF/gamma nail.     # Left intertrochanteric hip fx, s/p mechanical fall, s/p left ORIF on 5/14  - Oxycodone q4hrs PRN  - WBAT LLE as per ortho  - Incision with staples  plan to remove today  - Full code  - Making functional gains in therapies     -Pain control: fair control with oxycodone and ice packs     -GI/Bowel Mgmt: senna, Miralax prunes from home.     - Diet: DASH, heart healthy    - Legally blind, history of glaucoma.    - Anemia:   mild, stable, monitor    Precautions / PROPHYLAXIS:    - Falls  - Ortho: Weight bearing status: WBAT on the LLE   - DVT prophylaxis: Lovenox; venous duplex was neg 5/18 for DVT LLE

## 2021-05-28 NOTE — DISCHARGE NOTE PROVIDER - HOSPITAL COURSE
79 yo F with PMH of glaucoma b/l (legally blind) presents to the ED for after a mechanical fall out of her bed around 6:30AM this morning. Pt reports she was trying to reach for something on her dresser and rolled out of bed onto her left side. P reports she was unable to get up on her own due to left hip pain. Prior to this event, pt is fairly healthy, can walk without assistant Pt denies trauma to head, LOC, use of blood thinners, headaches, dizziness, neck pain, back pain, abdominal pain, n/v/d/c, weakness, numbness, tingling, urinary symptoms.     Patient was found to have left intertrochanteric fracture, underwent ORIF on 5/ /21, received physical therapy. She was assessed by  PHysisatrist  and admitted to rehab on   5/ /21 . she participated in acute rehab therapy , her current   level of Function   as og 5/28 --Patient ambulated 20' x 2 with RW mod assist, transfers and bed mobility mod assist. 1 step b/l rails x2   81 yo F with PMH of glaucoma b/l (legally blind) presents to the ED for after a mechanical fall out of her bed around 6:30AM this morning. Pt reports she was trying to reach for something on her dresser and rolled out of bed onto her left side. P reports she was unable to get up on her own due to left hip pain. Prior to this event, pt is fairly healthy, can walk without assistant Pt denies trauma to head, LOC, use of blood thinners, headaches, dizziness, neck pain, back pain, abdominal pain, n/v/d/c, weakness, numbness, tingling, urinary symptoms.     Patient was found to have left intertrochanteric fracture, underwent ORIF on 5/ /21, received physical therapy. She was assessed by  Physiatrist  and admitted to rehab on   5/18/21 . She participated in acute rehab therapy , her current   level of Function  as of 5/28 --Patient ambulated 20' x 2 with RW mod assist, transfers and bed mobility mod assist. 1 step b/l rails x2. She is being discharged home with home care . She needs to follow up with her Pmd/ cardiologist and ortho doctor Joycelyn in  2 weeks .   She will be discharged on baby asa  q12h for   4 more weeks .

## 2021-05-28 NOTE — PROGRESS NOTE ADULT - ATTENDING COMMENTS
I examined the patient with the resident and we discussed the findings and treatment plan. Tolerating the rehab program well. I agree with the findings and treatment plan as above, which I modified as needed. The patient continues to require 3 hrs a day of acute inpatient rehab.     Pain is fairly well controlled. Post-op anemia is stable. No other complaints. Incision clean and dry. To remove sutures this week.   Had large BM after enema yesterday with relief. Denies abdominal pain. Hip pain and ambulation improving.     Continue rehab program. Plan for discharge home with family when more independent. I examined the patient with the resident and we discussed the findings and treatment plan. Tolerating the rehab program well. I agree with the findings and treatment plan as above, which I modified as needed. The patient continues to require 3 hrs a day of acute inpatient rehab.     Pain is fairly well controlled. Post-op anemia is stable. No other complaints. Incision clean and dry. To remove sutures this week.   Had large BM after enema with relief. Denies abdominal pain. Continue Miralax. Hip pain and ambulation improving.     Continue rehab program. Plan for discharge home with family when more independent.

## 2021-05-29 LAB — GLUCOSE BLDC GLUCOMTR-MCNC: 85 MG/DL — SIGNIFICANT CHANGE UP (ref 70–99)

## 2021-05-29 RX ADMIN — OXYCODONE HYDROCHLORIDE 5 MILLIGRAM(S): 5 TABLET ORAL at 16:42

## 2021-05-29 RX ADMIN — POLYETHYLENE GLYCOL 3350 17 GRAM(S): 17 POWDER, FOR SOLUTION ORAL at 16:42

## 2021-05-29 RX ADMIN — OXYCODONE HYDROCHLORIDE 5 MILLIGRAM(S): 5 TABLET ORAL at 09:49

## 2021-05-29 RX ADMIN — OXYCODONE HYDROCHLORIDE 5 MILLIGRAM(S): 5 TABLET ORAL at 08:26

## 2021-05-29 RX ADMIN — ENOXAPARIN SODIUM 40 MILLIGRAM(S): 100 INJECTION SUBCUTANEOUS at 21:38

## 2021-05-29 RX ADMIN — OXYCODONE HYDROCHLORIDE 5 MILLIGRAM(S): 5 TABLET ORAL at 18:29

## 2021-05-29 NOTE — PROGRESS NOTE ADULT - SUBJECTIVE AND OBJECTIVE BOX
MEDICATIONS  (STANDING):  enoxaparin Injectable 40 milliGRAM(s) SubCutaneous at bedtime  polyethylene glycol 3350 17 Gram(s) Oral daily  senna 2 Tablet(s) Oral at bedtime    MEDICATIONS  (PRN):  acetaminophen   Tablet .. 650 milliGRAM(s) Oral every 6 hours PRN Temp greater or equal to 38C (100.4F), Mild Pain (1 - 3)  bisacodyl Suppository 10 milliGRAM(s) Rectal daily PRN Constipation  oxyCODONE    IR 5 milliGRAM(s) Oral every 4 hours PRN Moderate Pain (4 - 6)      Patient was stable overnight and expresses no new complaints.    T(C): 36.7 (05-29-21 @ 06:13), Max: 36.8 (05-28-21 @ 13:28)  HR: 70 (05-29-21 @ 06:13) (70 - 76)  BP: 126/67 (05-29-21 @ 06:13) (126/67 - 132/71)  RR: 18 (05-29-21 @ 06:13) (18 - 19)  SpO2: 98% (05-28-21 @ 21:36) (98% - 98%)      PE:    Alert   LUNGS- clear  COR- RRR S1S2  ABD- SOFT, NT  EXTR- w/o edema  NEURO- stable                      Rehab for left hip fx    Continue full acute rehab program.

## 2021-05-30 RX ADMIN — ENOXAPARIN SODIUM 40 MILLIGRAM(S): 100 INJECTION SUBCUTANEOUS at 22:01

## 2021-05-30 NOTE — PROGRESS NOTE ADULT - SUBJECTIVE AND OBJECTIVE BOX
Patient is a 80y old  Female who presents with a chief complaint of Left trochanteric fracture s/p ORIF (18 May 2021 15:33)    HPI:  80 year old F with PMHx of glaucoma b/l (legally blind) presents to the ED for after a mechanical fall out of her bed around 6:30AM this morning. Pt reports she was trying to reach for something on her dresser and rolled out of bed onto her left side. Patient reported she was unable to get up on her own due to left hip pain. Prior to this event, patient ambulated without assistance. At that time, the patient denied trauma to head, LOC, use of blood thinners, headaches, dizziness, neck pain, back pain, abdominal pain, n/v/d/c, weakness, numbness, tingling, urinary symptoms. Imaging consistent with left trochanteric fracture. Patient underwent left ORIF with a gamma nail on 5/14/21 with Dr. Hirsch. She was cleared for WBAT on the LLE. Patient seen and evaluated by PMR consultant and found to be an appropriate candidate for acute inpatient rehab.      TODAY'S SUBJECTIVE & REVIEW OF SYMPTOMS:   Patient seen and evaluated with attending physician in room. Patient reports no new complaints. Staples removed 5/28, surgical incision C/D/I.         Constitutional       [ x  ] WNL           [   ] poor appetite   [   ] insomnia   [   ] tired  Cardio:                [ x  ] WNL            [   ] CP   [   ] LINARES   [   ] palpitations              Resp:                   [x   ] WNL           [   ] SOB   [   ] cough   [   ] wheezing  GI:                        [x   ] WNL          [   ] constipation   [   ] diarrhea   [   ] abdominal pain   [   ] nausea   [   ] emesis                               :                      [  x ] WNL           [   ] VAUGHAN  [   ] dysuria   [   ] difficulty voiding            Endo:                   [ x  ] WNL           [   ] polyuria   [   ] temperature intolerance                Skin:                     [ x  ] WNL          [   ] pain   [   ] wound   [   ] rash  MSK:                    [   ] WNL            [   ] muscle pain   [ x  ] joint pain/ stiffness - left hip   [ x  ] muscle tenderness   [   ] swelling  Neuro:                 [  x ] WNL           [   ] HA   [   ] change in vision   [   ] tremor   [   ] weakness   [   ]dysphagia             Cognitive:             [ x  ] WNL           [   ]confusion     Psych:                  [ x  ] WNL           [   ] hallucinations   [   ]agitation   [   ] delusion   [   ]depression      PHYSICAL EXAM  Vital Signs Last 24 Hrs  T(C): 36.2 (30 May 2021 04:55), Max: 36.9 (29 May 2021 21:22)  T(F): 97.2 (30 May 2021 04:55), Max: 98.4 (29 May 2021 21:22)  HR: 65 (30 May 2021 04:55) (65 - 74)  BP: 136/68 (30 May 2021 04:55) (128/72 - 136/68)  RR: 18 (30 May 2021 04:55) (18 - 18)  SpO2: --      General:[ x  ] NAD, Resting Comfortable,   [   ] other:                                HEENT: [  ] NC/AT, EOMI, PERRL , Normal Conjunctivae,   [  x ] other: blind b/l  Cardio: [  ] RRR, no murmur   [  x ] other: known systolic murmur                               Pulm: [  x ] No Respiratory Distress,  Lungs CTAB,   [   ] other:                       Abdomen: [ x  ] ND/NT, Soft,   [   ] other:    : [ x  ] NO VAUGHAN CATHETER, [   ] VAUGHAN CATHETER- no meatal tear, no discharge, [   ] other:                                            MSK: [   ] No joint swelling, Full ROM,   [x  ] other:   appropriate tenderness on left hip. Incision open to air c/d/i, staples in place                                   Ext: [ x  ] No C/C/E, No calf tenderness,   [   ]other:    Skin: [ x  ]intact,   [  ] other:                                                                   Neurological Examination:  Cognitive: [ x  ] AAO x 3,   [    ]  other:                                                                      Attention:  [  x  ] intact,   [    ]  other:                            Memory: [  x  ] intact,    [    ]  other:     Mood/Affect: [  x  ] wnl,    [    ]  other:                                                                             Communication: [  x ] Fluent, no dysarthria, following commands:  [    ] other:   CN II - XII:  [    ] intact,  [ x   ] other: grossly intact except blind                                                                                        Motor:   RIGHT UE: [ x  ] WNL,  [   ] other:  LEFT    UE: [ x  ] WNL,  [   ] other:  RIGHT LE: [ x  ] WNL,  [   ] other:   LEFT    LE: [   ] WNL,  [  x ] other: hip not testable, o/w 4/5 throughout limited to pain     Tone: [ x   ] wnl,   [    ]  other:  DTRs: [ x  ]symmetric, [   ] other:  Coordination:   [   x ] intact,   [    ] other:                                                                           Sensory: [  x  ] Intact to light touch,   [    ] other:      MEDICATIONS  (STANDING):  enoxaparin Injectable 40 milliGRAM(s) SubCutaneous at bedtime  polyethylene glycol 3350 17 Gram(s) Oral daily  senna 2 Tablet(s) Oral at bedtime    MEDICATIONS  (PRN):  acetaminophen   Tablet .. 650 milliGRAM(s) Oral every 6 hours PRN Temp greater or equal to 38C (100.4F), Mild Pain (1 - 3)  bisacodyl Suppository 10 milliGRAM(s) Rectal daily PRN Constipation  oxyCODONE    IR 5 milliGRAM(s) Oral every 4 hours PRN Moderate Pain (4 - 6)

## 2021-05-30 NOTE — PROGRESS NOTE ADULT - ATTENDING COMMENTS
I reviewed the chart and examined the patient with the resident and we discussed the findings and treatment plan.  The patient is tolerating the rehab program well. I agree with the findings and treatment plan above, which I modified as indicated. The patient requires 3 hrs a day of acute inpatient rehab.

## 2021-05-31 LAB
ALBUMIN SERPL ELPH-MCNC: 3.8 G/DL — SIGNIFICANT CHANGE UP (ref 3.5–5.2)
ALP SERPL-CCNC: 227 U/L — HIGH (ref 30–115)
ALT FLD-CCNC: 9 U/L — SIGNIFICANT CHANGE UP (ref 0–41)
ANION GAP SERPL CALC-SCNC: 7 MMOL/L — SIGNIFICANT CHANGE UP (ref 7–14)
AST SERPL-CCNC: 20 U/L — SIGNIFICANT CHANGE UP (ref 0–41)
BASOPHILS # BLD AUTO: 0.05 K/UL — SIGNIFICANT CHANGE UP (ref 0–0.2)
BASOPHILS NFR BLD AUTO: 0.8 % — SIGNIFICANT CHANGE UP (ref 0–1)
BILIRUB SERPL-MCNC: 0.9 MG/DL — SIGNIFICANT CHANGE UP (ref 0.2–1.2)
BUN SERPL-MCNC: 9 MG/DL — LOW (ref 10–20)
CALCIUM SERPL-MCNC: 9.6 MG/DL — SIGNIFICANT CHANGE UP (ref 8.5–10.1)
CHLORIDE SERPL-SCNC: 103 MMOL/L — SIGNIFICANT CHANGE UP (ref 98–110)
CO2 SERPL-SCNC: 29 MMOL/L — SIGNIFICANT CHANGE UP (ref 17–32)
CREAT SERPL-MCNC: 0.5 MG/DL — LOW (ref 0.7–1.5)
EOSINOPHIL # BLD AUTO: 0.08 K/UL — SIGNIFICANT CHANGE UP (ref 0–0.7)
EOSINOPHIL NFR BLD AUTO: 1.2 % — SIGNIFICANT CHANGE UP (ref 0–8)
GLUCOSE SERPL-MCNC: 94 MG/DL — SIGNIFICANT CHANGE UP (ref 70–99)
HCT VFR BLD CALC: 36.3 % — LOW (ref 37–47)
HGB BLD-MCNC: 11.6 G/DL — LOW (ref 12–16)
IMM GRANULOCYTES NFR BLD AUTO: 0.6 % — HIGH (ref 0.1–0.3)
LYMPHOCYTES # BLD AUTO: 1.14 K/UL — LOW (ref 1.2–3.4)
LYMPHOCYTES # BLD AUTO: 17.1 % — LOW (ref 20.5–51.1)
MAGNESIUM SERPL-MCNC: 2.3 MG/DL — SIGNIFICANT CHANGE UP (ref 1.8–2.4)
MCHC RBC-ENTMCNC: 31.2 PG — HIGH (ref 27–31)
MCHC RBC-ENTMCNC: 32 G/DL — SIGNIFICANT CHANGE UP (ref 32–37)
MCV RBC AUTO: 97.6 FL — SIGNIFICANT CHANGE UP (ref 81–99)
MONOCYTES # BLD AUTO: 0.61 K/UL — HIGH (ref 0.1–0.6)
MONOCYTES NFR BLD AUTO: 9.2 % — SIGNIFICANT CHANGE UP (ref 1.7–9.3)
NEUTROPHILS # BLD AUTO: 4.73 K/UL — SIGNIFICANT CHANGE UP (ref 1.4–6.5)
NEUTROPHILS NFR BLD AUTO: 71.1 % — SIGNIFICANT CHANGE UP (ref 42.2–75.2)
NRBC # BLD: 0 /100 WBCS — SIGNIFICANT CHANGE UP (ref 0–0)
PLATELET # BLD AUTO: 301 K/UL — SIGNIFICANT CHANGE UP (ref 130–400)
POTASSIUM SERPL-MCNC: 4.2 MMOL/L — SIGNIFICANT CHANGE UP (ref 3.5–5)
POTASSIUM SERPL-SCNC: 4.2 MMOL/L — SIGNIFICANT CHANGE UP (ref 3.5–5)
PROT SERPL-MCNC: 5.9 G/DL — LOW (ref 6–8)
RBC # BLD: 3.72 M/UL — LOW (ref 4.2–5.4)
RBC # FLD: 14 % — SIGNIFICANT CHANGE UP (ref 11.5–14.5)
SODIUM SERPL-SCNC: 139 MMOL/L — SIGNIFICANT CHANGE UP (ref 135–146)
WBC # BLD: 6.65 K/UL — SIGNIFICANT CHANGE UP (ref 4.8–10.8)
WBC # FLD AUTO: 6.65 K/UL — SIGNIFICANT CHANGE UP (ref 4.8–10.8)

## 2021-05-31 RX ORDER — ACETAMINOPHEN 500 MG
975 TABLET ORAL DAILY
Refills: 0 | Status: DISCONTINUED | OUTPATIENT
Start: 2021-05-31 | End: 2021-06-02

## 2021-05-31 RX ADMIN — ENOXAPARIN SODIUM 40 MILLIGRAM(S): 100 INJECTION SUBCUTANEOUS at 21:16

## 2021-05-31 RX ADMIN — Medication 975 MILLIGRAM(S): at 14:23

## 2021-05-31 RX ADMIN — Medication 975 MILLIGRAM(S): at 12:27

## 2021-05-31 NOTE — PROGRESS NOTE ADULT - ATTENDING COMMENTS
I reviewed the chart and examined the patient with the resident and we discussed the findings and treatment plan.  The patient is tolerating the rehab program well. I agree with the findings and treatment plan above, which I modified as indicated. The patient requires 3 hrs a day of acute inpatient rehab. I reviewed the chart and examined the patient with the resident and we discussed the findings and treatment plan.  The patient is tolerating the rehab program well. I agree with the findings and treatment plan above, which I modified as indicated. The patient requires 3 hrs a day of acute inpatient rehab. Pain improving. Doing well. Oxycodone changed to prn, with standing Tylenol 975 mg q am.

## 2021-05-31 NOTE — PROGRESS NOTE ADULT - SUBJECTIVE AND OBJECTIVE BOX
Patient is a 80y old  Female who presents with a chief complaint of Left trochanteric fracture s/p ORIF (18 May 2021 15:33)    HPI:  80 year old F with PMHx of glaucoma b/l (legally blind) presents to the ED for after a mechanical fall out of her bed around 6:30AM this morning. Pt reports she was trying to reach for something on her dresser and rolled out of bed onto her left side. Patient reported she was unable to get up on her own due to left hip pain. Prior to this event, patient ambulated without assistance. At that time, the patient denied trauma to head, LOC, use of blood thinners, headaches, dizziness, neck pain, back pain, abdominal pain, n/v/d/c, weakness, numbness, tingling, urinary symptoms. Imaging consistent with left trochanteric fracture. Patient underwent left ORIF with a gamma nail on 5/14/21 with Dr. Hirsch. She was cleared for WBAT on the LLE. Patient seen and evaluated by PMR consultant and found to be an appropriate candidate for acute inpatient rehab.      TODAY'S SUBJECTIVE & REVIEW OF SYMPTOMS:   Patient seen and evaluated with attending physician in gym. Patient reports no new complaints.          Constitutional       [ x  ] WNL           [   ] poor appetite   [   ] insomnia   [   ] tired  Cardio:                [ x  ] WNL            [   ] CP   [   ] LINARES   [   ] palpitations              Resp:                   [x   ] WNL           [   ] SOB   [   ] cough   [   ] wheezing  GI:                        [x   ] WNL          [   ] constipation   [   ] diarrhea   [   ] abdominal pain   [   ] nausea   [   ] emesis                               :                      [  x ] WNL           [   ] VAUGHAN  [   ] dysuria   [   ] difficulty voiding            Endo:                   [ x  ] WNL           [   ] polyuria   [   ] temperature intolerance                Skin:                     [ x  ] WNL          [   ] pain   [   ] wound   [   ] rash  MSK:                    [   ] WNL            [   ] muscle pain   [ x  ] joint pain/ stiffness - left hip   [ x  ] muscle tenderness   [   ] swelling  Neuro:                 [  x ] WNL           [   ] HA   [   ] change in vision   [   ] tremor   [   ] weakness   [   ]dysphagia             Cognitive:             [ x  ] WNL           [   ]confusion     Psych:                  [ x  ] WNL           [   ] hallucinations   [   ]agitation   [   ] delusion   [   ]depression      PHYSICAL EXAM  Vital Signs Last 24 Hrs  T(C): 36.4 (31 May 2021 12:05), Max: 36.8 (30 May 2021 13:00)  T(F): 97.6 (31 May 2021 12:05), Max: 98.2 (30 May 2021 13:00)  HR: 67 (31 May 2021 12:05) (65 - 69)  BP: 129/69 (31 May 2021 12:05) (129/69 - 146/72)  BP(mean): --  RR: 18 (31 May 2021 12:05) (18 - 18)  SpO2: --      General:[ x  ] NAD, Resting Comfortable,   [   ] other:                                HEENT: [  ] NC/AT, EOMI, PERRL , Normal Conjunctivae,   [  x ] other: blind b/l  Cardio: [  ] RRR, no murmur   [  x ] other: known systolic murmur                               Pulm: [  x ] No Respiratory Distress,  Lungs CTAB,   [   ] other:                       Abdomen: [ x  ] ND/NT, Soft,   [   ] other:    : [ x  ] NO VAUGHAN CATHETER, [   ] VAUGHAN CATHETER- no meatal tear, no discharge, [   ] other:                                            MSK: [   ] No joint swelling, Full ROM,   [x  ] other:   appropriate tenderness on left hip. Incision open to air c/d/i, staples in place                                   Ext: [ x  ] No C/C/E, No calf tenderness,   [   ]other:    Skin: [ x  ]intact,   [  ] other:                                                                   Neurological Examination:  Cognitive: [ x  ] AAO x 3,   [    ]  other:                                                                      Attention:  [  x  ] intact,   [    ]  other:                            Memory: [  x  ] intact,    [    ]  other:     Mood/Affect: [  x  ] wnl,    [    ]  other:                                                                             Communication: [  x ] Fluent, no dysarthria, following commands:  [    ] other:   CN II - XII:  [    ] intact,  [ x   ] other: grossly intact except blind                                                                                        Motor:   RIGHT UE: [ x  ] WNL,  [   ] other:  LEFT    UE: [ x  ] WNL,  [   ] other:  RIGHT LE: [ x  ] WNL,  [   ] other:   LEFT    LE: [   ] WNL,  [  x ] other: hip not testable, o/w 4/5 throughout limited to pain     Tone: [ x   ] wnl,   [    ]  other:  DTRs: [ x  ]symmetric, [   ] other:  Coordination:   [   x ] intact,   [    ] other:                                                                           Sensory: [  x  ] Intact to light touch,   [    ] other:      MEDICATIONS  (STANDING):  acetaminophen   Tablet .. 975 milliGRAM(s) Oral daily  enoxaparin Injectable 40 milliGRAM(s) SubCutaneous at bedtime  polyethylene glycol 3350 17 Gram(s) Oral daily  senna 2 Tablet(s) Oral at bedtime    MEDICATIONS  (PRN):  acetaminophen   Tablet .. 650 milliGRAM(s) Oral every 6 hours PRN Temp greater or equal to 38C (100.4F), Mild Pain (1 - 3)  bisacodyl Suppository 10 milliGRAM(s) Rectal daily PRN Constipation  oxyCODONE    IR 5 milliGRAM(s) Oral every 4 hours PRN Moderate Pain (4 - 6)

## 2021-05-31 NOTE — PROGRESS NOTE ADULT - ASSESSMENT
Rehab of left intertrochanteric hip fracture, s/p ORIF/gamma nail.     # Left intertrochanteric hip fx, s/p mechanical fall, s/p left ORIF on 5/14  - Oxycodone q4hrs PRN  - WBAT LLE as per ortho  - Incision with staples  plan to remove today  - Full code  - Making functional gains in therapies     -Pain control: tylenol 975 stranding in Am and PRN for further control       -GI/Bowel Mgmt: senna, Miralax prunes from home.     - Diet: DASH, heart healthy    - Legally blind, history of glaucoma.    - Anemia:   mild, stable, monitor    Precautions / PROPHYLAXIS:    - Falls  - Ortho: Weight bearing status: WBAT on the LLE   - DVT prophylaxis: Lovenox; venous duplex was neg 5/18 for DVT LLE

## 2021-06-01 RX ADMIN — Medication 975 MILLIGRAM(S): at 11:35

## 2021-06-01 RX ADMIN — ENOXAPARIN SODIUM 40 MILLIGRAM(S): 100 INJECTION SUBCUTANEOUS at 21:39

## 2021-06-01 RX ADMIN — Medication 975 MILLIGRAM(S): at 13:04

## 2021-06-01 RX ADMIN — SENNA PLUS 2 TABLET(S): 8.6 TABLET ORAL at 21:38

## 2021-06-01 NOTE — PROGRESS NOTE ADULT - ASSESSMENT
Assessment and Plan:   · Assessment	  Rehab of left intertrochanteric hip fracture, s/p ORIF/gamma nail.     # Left intertrochanteric hip fx, s/p mechanical fall, s/p left ORIF on 5/14  - WBAT LLE as per ortho  - Incision healing. Staples out  - Making functional gains in therapies  - Antic d/c home with family in am.     -Pain control: tylenol 975 stranding in Am and PRN for further control       -GI/Bowel Mgmt: senna, Miralax prunes from home.     - Diet: DASH, heart healthy    Blind, history of glaucoma. Well adjusted.    - Anemia:   mild, stable, monitor    Precautions / PROPHYLAXIS:    - Falls  - Ortho: Weight bearing status: WBAT on the LLE   - DVT prophylaxis: Lovenox; venous duplex was neg 5/18 for DVT LLE

## 2021-06-01 NOTE — PROGRESS NOTE ADULT - ATTENDING COMMENTS
I reviewed the chart and examined the patient with the resident and we discussed the findings and treatment plan.  The patient is tolerating the rehab program well. I agree with the findings and treatment plan above, which I modified as indicated. The patient requires 3 hrs a day of acute inpatient rehab.   Ambulating with CG. Medically stable. Pain controlled. Antic d/c home with family in am.

## 2021-06-01 NOTE — PROGRESS NOTE ADULT - SUBJECTIVE AND OBJECTIVE BOX
HPI:  80 year old F with PMHx of glaucoma b/l (legally blind) presents to the ED for after a mechanical fall out of her bed around 6:30AM this morning. Pt reports she was trying to reach for something on her dresser and rolled out of bed onto her left side. Patient reported she was unable to get up on her own due to left hip pain. Prior to this event, patient ambulated without assistance. At that time, the patient denied trauma to head, LOC, use of blood thinners, headaches, dizziness, neck pain, back pain, abdominal pain, n/v/d/c, weakness, numbness, tingling, urinary symptoms. Imaging consistent with left trochanteric fracture. Patient underwent left ORIF with a gamma nail on 5/14/21 with Dr. Hirsch. She was cleared for WBAT on the LLE. Patient seen and evaluated by PMR consultant and found to be an appropriate candidate for acute inpatient rehab.      TODAY'S SUBJECTIVE & REVIEW OF SYMPTOMS:   Patient seen and evaluated with attending physician in gym. Patient reports no new complaints.      CLOF  bed mobility touching assist   supine to sit touching assist   sit to stand touching assist   ambulation ambulated 75x2 ft with RW and contact guard         Constitutional       [ x  ] WNL           [   ] poor appetite   [   ] insomnia   [   ] tired  Cardio:                [ x  ] WNL            [   ] CP   [   ] LINARES   [   ] palpitations              Resp:                   [x   ] WNL           [   ] SOB   [   ] cough   [   ] wheezing  GI:                        [x   ] WNL          [   ] constipation   [   ] diarrhea   [   ] abdominal pain   [   ] nausea   [   ] emesis                               :                      [  x ] WNL           [   ] VAUGHAN  [   ] dysuria   [   ] difficulty voiding            Endo:                   [ x  ] WNL           [   ] polyuria   [   ] temperature intolerance                Skin:                     [ x  ] WNL          [   ] pain   [   ] wound   [   ] rash  MSK:                    [   ] WNL            [   ] muscle pain   [ x  ] joint pain/ stiffness - left hip   [ x  ] muscle tenderness   [   ] swelling  Neuro:                 [  x ] WNL           [   ] HA   [   ] change in vision   [   ] tremor   [   ] weakness   [   ]dysphagia             Cognitive:             [ x  ] WNL           [   ]confusion     Psych:                  [ x  ] WNL           [   ] hallucinations   [   ]agitation   [   ] delusion   [   ]depression      PHYSICAL EXAM    General:[ x  ] NAD, Resting Comfortable,   [   ] other:                                HEENT: [  ] NC/AT, EOMI, PERRL , Normal Conjunctivae,   [  x ] other: blind b/l  Cardio: [  ] RRR, no murmur   [  x ] other: known systolic murmur                               Pulm: [  x ] No Respiratory Distress,  Lungs CTAB,   [   ] other:                       Abdomen: [ x  ] ND/NT, Soft,   [   ] other:    : [ x  ] NO VAUGHAN CATHETER, [   ] VAUGHAN CATHETER- no meatal tear, no discharge, [   ] other:                                            MSK: [   ] No joint swelling, Full ROM,   [x  ] other:   appropriate tenderness on left hip. Incision open to air c/d/i, staples in place                                   Ext: [ x  ] No C/C/E, No calf tenderness,   [   ]other:    Skin: [ x  ]intact,   [  ] other:                                                                   Neurological Examination:  Cognitive: [ x  ] AAO x 3,   [    ]  other:                                                                      Attention:  [  x  ] intact,   [    ]  other:                            Memory: [  x  ] intact,    [    ]  other:     Mood/Affect: [  x  ] wnl,    [    ]  other:                                                                             Communication: [  x ] Fluent, no dysarthria, following commands:  [    ] other:   CN II - XII:  [    ] intact,  [ x   ] other: grossly intact except blind                                                                                        Motor:   RIGHT UE: [ x  ] WNL,  [   ] other:  LEFT    UE: [ x  ] WNL,  [   ] other:  RIGHT LE: [ x  ] WNL,  [   ] other:   LEFT    LE: [   ] WNL,  [  x ] other: hip not testable, o/w 4/5 throughout limited to pain     Tone: [ x   ] wnl,   [    ]  other:  DTRs: [ x  ]symmetric, [   ] other:  Coordination:   [   x ] intact,   [    ] other:                                                                           Sensory: [  x  ] Intact to light touch,   [    ] other:      Vitals:  ============  T(F): 98.3 (01 Jun 2021 05:50), Max: 98.3 (01 Jun 2021 05:50)  HR: 65 (01 Jun 2021 05:50)  BP: 149/78 (01 Jun 2021 05:50)  RR: 18 (01 Jun 2021 05:50)  SpO2: --  temp max in last 48H T(F): , Max: 98.3 (06-01-21 @ 05:50)    =======================================================  Current Antibiotics:    Other medications:  acetaminophen   Tablet .. 975 milliGRAM(s) Oral daily  enoxaparin Injectable 40 milliGRAM(s) SubCutaneous at bedtime  polyethylene glycol 3350 17 Gram(s) Oral daily  senna 2 Tablet(s) Oral at bedtime      =======================================================  Labs:                        11.6   6.65  )-----------( 301      ( 31 May 2021 06:52 )             36.3     05-31    139  |  103  |  9<L>  ----------------------------<  94  4.2   |  29  |  0.5<L>    Ca    9.6      31 May 2021 06:52  Mg     2.3     05-31    TPro  5.9<L>  /  Alb  3.8  /  TBili  0.9  /  DBili  x   /  AST  20  /  ALT  9   /  AlkPhos  227<H>  05-31      Creatinine, Serum: 0.5 mg/dL (05-31-21 @ 06:52)            WBC Count: 6.65 K/uL (05-31-21 @ 06:52)      COVID-19 PCR: NotDetec (05-17-21 @ 12:39)  COVID-19 PCR: NotDetec (05-12-21 @ 08:55)      Alkaline Phosphatase, Serum: 227 U/L (05-31-21 @ 06:52)  Alanine Aminotransferase (ALT/SGPT): 9 U/L (05-31-21 @ 06:52)  Aspartate Aminotransferase (AST/SGOT): 20 U/L (05-31-21 @ 06:52)  Bilirubin Total, Serum: 0.9 mg/dL (05-31-21 @ 06:52)        Assessment and Plan:   · Assessment	  Rehab of left intertrochanteric hip fracture, s/p ORIF/gamma nail.     # Left intertrochanteric hip fx, s/p mechanical fall, s/p left ORIF on 5/14  - Oxycodone q4hrs PRN  - WBAT LLE as per ortho  - Incision with staples   - Full code  - Making functional gains in therapies     -Pain control: tylenol 975 stranding in Am and PRN for further control       -GI/Bowel Mgmt: senna, Miralax prunes from home.     - Diet: DASH, heart healthy    - Legally blind, history of glaucoma.    - Anemia:   mild, stable, monitor    Precautions / PROPHYLAXIS:    - Falls  - Ortho: Weight bearing status: WBAT on the LLE   - DVT prophylaxis: Lovenox; venous duplex was neg 5/18 for DVT LLE   HPI:  80 year old F with PMHx of glaucoma b/l (legally blind) presents to the ED for after a mechanical fall out of her bed around 6:30AM this morning. Pt reports she was trying to reach for something on her dresser and rolled out of bed onto her left side. Patient reported she was unable to get up on her own due to left hip pain. Prior to this event, patient ambulated without assistance. At that time, the patient denied trauma to head, LOC, use of blood thinners, headaches, dizziness, neck pain, back pain, abdominal pain, n/v/d/c, weakness, numbness, tingling, urinary symptoms. Imaging consistent with left trochanteric fracture. Patient underwent left ORIF with a gamma nail on 5/14/21 with Dr. Hirsch. She was cleared for WBAT on the LLE. Patient seen and evaluated by PMR consultant and found to be an appropriate candidate for acute inpatient rehab.      TODAY'S SUBJECTIVE & REVIEW OF SYMPTOMS:   Patient seen and evaluated with attending physician in gym. Patient reports no new complaints.      CLOF  bed mobility touching assist   supine to sit touching assist   sit to stand touching assist   ambulation ambulated 75x2 ft with RW and contact guard         Constitutional       [ x  ] WNL           [   ] poor appetite   [   ] insomnia   [   ] tired  Cardio:                [ x  ] WNL            [   ] CP   [   ] LINARES   [   ] palpitations              Resp:                   [x   ] WNL           [   ] SOB   [   ] cough   [   ] wheezing  GI:                        [x   ] WNL          [   ] constipation   [   ] diarrhea   [   ] abdominal pain   [   ] nausea   [   ] emesis                               :                      [  x ] WNL           [   ] VAUGHAN  [   ] dysuria   [   ] difficulty voiding            Endo:                   [ x  ] WNL           [   ] polyuria   [   ] temperature intolerance                Skin:                     [ x  ] WNL          [   ] pain   [   ] wound   [   ] rash  MSK:                    [   ] WNL            [   ] muscle pain   [ x  ] joint pain/ stiffness - left hip   [ x  ] muscle tenderness   [   ] swelling  Neuro:                 [  x ] WNL           [   ] HA   [   ] change in vision   [   ] tremor   [   ] weakness   [   ]dysphagia             Cognitive:             [ x  ] WNL           [   ]confusion     Psych:                  [ x  ] WNL           [   ] hallucinations   [   ]agitation   [   ] delusion   [   ]depression      PHYSICAL EXAM    General:[ x  ] NAD, Resting Comfortable,   [   ] other:                                HEENT: [  ] NC/AT, EOMI, PERRL , Normal Conjunctivae,   [  x ] other: blind b/l  Cardio: [  ] RRR, no murmur   [  x ] other: known systolic murmur                               Pulm: [  x ] No Respiratory Distress,  Lungs CTAB,   [   ] other:                       Abdomen: [ x  ] ND/NT, Soft,   [   ] other:    : [ x  ] NO VAUGHAN CATHETER, [   ] VAUGHAN CATHETER- no meatal tear, no discharge, [   ] other:                                            MSK: [   ] No joint swelling, Full ROM,   [x  ] other:   appropriate tenderness on left hip. Incision open to air c/d/i, staples in place                                   Ext: [ x  ] No C/C/E, No calf tenderness,   [   ]other:    Skin: [ x  ]intact,   [  ] other:                                                                   Neurological Examination:  Cognitive: [ x  ] AAO x 3,   [    ]  other:                                                                      Attention:  [  x  ] intact,   [    ]  other:                            Memory: [  x  ] intact,    [    ]  other:     Mood/Affect: [  x  ] wnl,    [    ]  other:                                                                             Communication: [  x ] Fluent, no dysarthria, following commands:  [    ] other:   CN II - XII:  [    ] intact,  [ x   ] other: grossly intact except blind                                                                                        Motor:   RIGHT UE: [ x  ] WNL,  [   ] other:  LEFT    UE: [ x  ] WNL,  [   ] other:  RIGHT LE: [ x  ] WNL,  [   ] other:   LEFT    LE: [   ] WNL,  [  x ] other: hip not testable, o/w 4/5 throughout limited to pain     Tone: [ x   ] wnl,   [    ]  other:  DTRs: [ x  ]symmetric, [   ] other:  Coordination:   [   x ] intact,   [    ] other:                                                                           Sensory: [  x  ] Intact to light touch,   [    ] other:      Vitals:  ============  T(F): 98.3 (01 Jun 2021 05:50), Max: 98.3 (01 Jun 2021 05:50)  HR: 65 (01 Jun 2021 05:50)  BP: 149/78 (01 Jun 2021 05:50)  RR: 18 (01 Jun 2021 05:50)  SpO2: --  temp max in last 48H T(F): , Max: 98.3 (06-01-21 @ 05:50)    =======================================================  MEDICATIONS  (STANDING):  acetaminophen   Tablet .. 975 milliGRAM(s) Oral daily  enoxaparin Injectable 40 milliGRAM(s) SubCutaneous at bedtime  polyethylene glycol 3350 17 Gram(s) Oral daily  senna 2 Tablet(s) Oral at bedtime    MEDICATIONS  (PRN):  acetaminophen   Tablet .. 650 milliGRAM(s) Oral every 6 hours PRN Temp greater or equal to 38C (100.4F), Mild Pain (1 - 3)  bisacodyl Suppository 10 milliGRAM(s) Rectal daily PRN Constipation  oxyCODONE    IR 5 milliGRAM(s) Oral every 4 hours PRN Moderate Pain (4 - 6)    =======================================================  Labs:                        11.6   6.65  )-----------( 301      ( 31 May 2021 06:52 )             36.3     05-31    139  |  103  |  9<L>  ----------------------------<  94  4.2   |  29  |  0.5<L>    Ca    9.6      31 May 2021 06:52  Mg     2.3     05-31    TPro  5.9<L>  /  Alb  3.8  /  TBili  0.9  /  DBili  x   /  AST  20  /  ALT  9   /  AlkPhos  227<H>  05-31      Creatinine, Serum: 0.5 mg/dL (05-31-21 @ 06:52)            WBC Count: 6.65 K/uL (05-31-21 @ 06:52)      COVID-19 PCR: NotDetec (05-17-21 @ 12:39)  COVID-19 PCR: NotDetec (05-12-21 @ 08:55)      Alkaline Phosphatase, Serum: 227 U/L (05-31-21 @ 06:52)  Alanine Aminotransferase (ALT/SGPT): 9 U/L (05-31-21 @ 06:52)  Aspartate Aminotransferase (AST/SGOT): 20 U/L (05-31-21 @ 06:52)  Bilirubin Total, Serum: 0.9 mg/dL (05-31-21 @ 06:52)

## 2021-06-02 ENCOUNTER — TRANSCRIPTION ENCOUNTER (OUTPATIENT)
Age: 81
End: 2021-06-02

## 2021-06-02 VITALS
DIASTOLIC BLOOD PRESSURE: 77 MMHG | TEMPERATURE: 98 F | RESPIRATION RATE: 18 BRPM | SYSTOLIC BLOOD PRESSURE: 134 MMHG | OXYGEN SATURATION: 95 % | HEART RATE: 62 BPM

## 2021-06-02 RX ORDER — OXYCODONE HYDROCHLORIDE 5 MG/1
1 TABLET ORAL
Qty: 21 | Refills: 0
Start: 2021-06-02 | End: 2021-06-08

## 2021-06-02 RX ORDER — OMEPRAZOLE 10 MG/1
1 CAPSULE, DELAYED RELEASE ORAL
Qty: 30 | Refills: 0
Start: 2021-06-02 | End: 2021-07-01

## 2021-06-02 NOTE — PROGRESS NOTE ADULT - SUBJECTIVE AND OBJECTIVE BOX
HPI:  80 year old F with PMHx of glaucoma b/l (legally blind) presents to the ED for after a mechanical fall out of her bed around 6:30AM this morning. Pt reports she was trying to reach for something on her dresser and rolled out of bed onto her left side. Patient reported she was unable to get up on her own due to left hip pain. Prior to this event, patient ambulated without assistance. At that time, the patient denied trauma to head, LOC, use of blood thinners, headaches, dizziness, neck pain, back pain, abdominal pain, n/v/d/c, weakness, numbness, tingling, urinary symptoms. Imaging consistent with left trochanteric fracture. Patient underwent left ORIF with a gamma nail on 5/14/21 with Dr. Hirsch. She was cleared for WBAT on the LLE. Patient seen and evaluated by PMR consultant and found to be an appropriate candidate for acute inpatient rehab.      TODAY'S SUBJECTIVE & REVIEW OF SYMPTOMS:   Patient seen and evaluated at bedside with Dr. Ott. No overnight events. Patient states she is doing well. Plan for discharge home today. Patient denies any new complaints.      Current level of function: Contact guard assist with bed mobility and transfers. Contact guard assist with ambulation 100 ft x1 with RW. Contact guard assist to ascend 8 steps with 2 HRs (01 June 2021).    Review of systems:  Constitutional       [ x  ] WNL           [   ] poor appetite   [   ] insomnia   [   ] tired  Cardio:                [ x  ] WNL            [   ] CP   [   ] LINARES   [   ] palpitations              Resp:                   [x   ] WNL           [   ] SOB   [   ] cough   [   ] wheezing  GI:                        [x   ] WNL          [   ] constipation   [   ] diarrhea   [   ] abdominal pain   [   ] nausea   [   ] emesis                               :                      [  x ] WNL           [   ] VAUGHAN  [   ] dysuria   [   ] difficulty voiding            Endo:                   [ x  ] WNL           [   ] polyuria   [   ] temperature intolerance                Skin:                     [ x  ] WNL          [   ] pain   [   ] wound   [   ] rash  MSK:                    [   ] WNL            [   ] muscle pain   [ x  ] joint pain/ stiffness - left hip   [ x  ] muscle tenderness   [   ] swelling  Neuro:                 [  x ] WNL           [   ] HA   [   ] change in vision   [   ] tremor   [   ] weakness   [   ]dysphagia             Cognitive:             [ x  ] WNL           [   ]confusion     Psych:                  [ x  ] WNL           [   ] hallucinations   [   ]agitation   [   ] delusion   [   ]depression      PHYSICAL EXAM  Vital Signs Last 24 Hrs  T(C): 37.1 (02 Jun 2021 06:07), Max: 37.1 (02 Jun 2021 06:07)  T(F): 98.7 (02 Jun 2021 06:07), Max: 98.7 (02 Jun 2021 06:07)  HR: 66 (02 Jun 2021 06:07) (65 - 66)  BP: 147/72 (02 Jun 2021 06:07) (132/74 - 147/73)  BP(mean): --  RR: 18 (02 Jun 2021 06:07) (18 - 18)  SpO2: 97% (02 Jun 2021 06:07) (96% - 97%)    General:[ x  ] NAD, Resting Comfortable,   [   ] other:                                HEENT: [  ] NC/AT, EOMI, PERRL , Normal Conjunctivae,   [  x ] other: blind bilaterally  Cardio: [  ] RRR, no murmur   [  x ] other: known systolic murmur                               Pulm: [  x ] No Respiratory Distress,  Lungs CTAB,   [   ] other:                       Abdomen: [ x  ] ND/NT, Soft,   [   ] other:    : [ x  ] NO VAUGHAN CATHETER, [   ] VAUGHAN CATHETER- no meatal tear, no discharge, [   ] other:                                            MSK: [   ] No joint swelling, Full ROM,   [x  ] other:   appropriate tenderness on left hip. Incision open to air c/d/i, staples in place                                   Ext: [ x  ] No C/C/E, No calf tenderness,   [   ]other:    Skin: [ x  ]intact,   [  ] other:                                                                   Neurological Examination:  Cognitive: [ x  ] AAO x 3,   [    ]  other:                                                                      Attention:  [  x  ] intact,   [    ]  other:                            Memory: [  x  ] intact,    [    ]  other:     Mood/Affect: [  x  ] wnl,    [    ]  other:                                                                             Communication: [  x ] Fluent, no dysarthria, following commands:  [    ] other:   CN II - XII:  [    ] intact,  [ x   ] other: grossly intact except blind                                                                                        Motor:   RIGHT UE: [ x  ] WNL,  [   ] other:  LEFT    UE: [ x  ] WNL,  [   ] other:  RIGHT LE: [ x  ] WNL,  [   ] other:   LEFT    LE: [   ] WNL,  [  x ] other: hip not testable, o/w 4/5 throughout limited to pain     Tone: [ x   ] wnl,   [    ]  other:  DTRs: [ x  ]symmetric, [   ] other:  Coordination:   [   x ] intact,   [    ] other:                                                                           Sensory: [  x  ] Intact to light touch,   [    ] other:        MEDICATIONS  (STANDING):  acetaminophen   Tablet .. 975 milliGRAM(s) Oral daily  enoxaparin Injectable 40 milliGRAM(s) SubCutaneous at bedtime  polyethylene glycol 3350 17 Gram(s) Oral daily  senna 2 Tablet(s) Oral at bedtime    MEDICATIONS  (PRN):  acetaminophen   Tablet .. 650 milliGRAM(s) Oral every 6 hours PRN Temp greater or equal to 38C (100.4F), Mild Pain (1 - 3)  bisacodyl Suppository 10 milliGRAM(s) Rectal daily PRN Constipation  oxyCODONE    IR 5 milliGRAM(s) Oral every 4 hours PRN Moderate Pain (4 - 6)

## 2021-06-02 NOTE — PROGRESS NOTE ADULT - NSICDXPILOT_GEN_ALL_CORE
[place]

## 2021-06-02 NOTE — DISCHARGE NOTE NURSING/CASE MANAGEMENT/SOCIAL WORK - PATIENT PORTAL LINK FT
You can access the FollowMyHealth Patient Portal offered by [facility] by registering at the following website: [email or web address]. By joining [facility]’s FollowMyHealth portal, you will also be able to view your health information using other applications (apps) compatible with our system.

## 2021-06-02 NOTE — PROGRESS NOTE ADULT - ASSESSMENT
Assessment and Plan:   · Assessment	  Rehab of left intertrochanteric hip fracture, s/p ORIF/gamma nail.     # Left intertrochanteric hip fx, s/p mechanical fall, s/p left ORIF on 5/14  - WBAT LLE as per ortho  - Incision healing. Staples out  - Patient completed 3 hours of daily interdisciplinary acute inpatient rehab.  - Discharge home today     -Pain control: Patient received Tylenol 975 mg PO daily in AM, Tylenol 650 mg PO Q6 hours PRN for mild pain, and Oxycodone 5 mg PO Q4 hours PRN for moderate pain while inpatient       -GI/Bowel Mgmt: Patient received senna and miralax while inpatient. Patient brought prunes from home.     - Diet: Patient received DASH, heart healthy while inpatient    Blind, history of glaucoma. Well adjusted.    - Anemia:   - Hgb stable    Precautions / PROPHYLAXIS:    - Falls  - Ortho: Weight bearing status: WBAT on the LLE   - DVT prophylaxis: Patient received Lovenox 40 mg SubQ nightly while inpatient; venous duplex was neg 5/18 for DVT LLE   Assessment and Plan:   · Assessment	  Rehab of left intertrochanteric hip fracture, s/p ORIF/gamma nail.     # Left intertrochanteric hip fx, s/p mechanical fall, s/p left ORIF on 5/14  - WBAT LLE as per ortho  - Incision healing. Staples out  - Patient completed 3 hours of daily interdisciplinary acute inpatient rehab.  - Discharge home today     -Pain control: Patient received Tylenol 975 mg PO daily in AM, Tylenol 650 mg PO Q6 hours PRN for mild pain, and Oxycodone 5 mg PO Q4 hours PRN for moderate pain while inpatient       -GI/Bowel Mgmt: Patient received senna and miralax while inpatient. Patient brought prunes from home.     - Diet: Patient received DASH, heart healthy while inpatient    Blind, history of glaucoma. Well adjusted.    - Anemia:   - Hgb stable    Precautions / PROPHYLAXIS:    - Falls  - Ortho: Weight bearing status: WBAT on the LLE   - DVT prophylaxis: Patient received Lovenox 40 mg SubQ nightly while inpatient; venous duplex was neg 5/18 for DVT LLE. Cont ASA 81 mg bid until 6 weeks post-op.

## 2021-06-02 NOTE — PROGRESS NOTE ADULT - REASON FOR ADMISSION
Left trochanteric fracture s/p ORIF

## 2021-06-02 NOTE — PROGRESS NOTE ADULT - PROVIDER SPECIALTY LIST ADULT
Physiatry
Rehab Medicine
Physiatry
Physiatry
Rehab Medicine

## 2021-06-02 NOTE — PROGRESS NOTE ADULT - ATTENDING COMMENTS
I reviewed the chart and examined the patient with the resident and we discussed the findings and treatment plan.  The patient is tolerating the rehab program well. I agree with the findings and treatment plan above, which I modified as indicated. The patient requires 3 hrs a day of acute inpatient rehab.   Ambulating with CG. Medically stable. Pain controlled. Antic d/c home with family in am. I reviewed the chart and examined the patient with the resident and we discussed the findings and treatment plan.  The patient is tolerating the rehab program well. I agree with the findings and treatment plan above, which I modified as indicated. The patient tolerated and made excellent gains on her 3 hrs a day of acute inpatient rehab.   Ambulating with CG. Medically stable. Pain controlled. Anemia stable. Pain controlled with Tylenol  D/C home today with daughter and [facility] home care PT and HHA. D/C on ASA 81 mg bid for 6 weeks post op for DVT prophylaxis.   F/U with Orth and PMD in 2-3 weeks. Rehab Medicine f/u prn.
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
## 2021-06-09 DIAGNOSIS — H40.9 UNSPECIFIED GLAUCOMA: ICD-10-CM

## 2021-06-09 DIAGNOSIS — S72.142D DISPLACED INTERTROCHANTERIC FRACTURE OF LEFT FEMUR, SUBSEQUENT ENCOUNTER FOR CLOSED FRACTURE WITH ROUTINE HEALING: ICD-10-CM

## 2021-06-09 DIAGNOSIS — D64.9 ANEMIA, UNSPECIFIED: ICD-10-CM

## 2021-06-09 DIAGNOSIS — I35.0 NONRHEUMATIC AORTIC (VALVE) STENOSIS: ICD-10-CM

## 2021-06-09 DIAGNOSIS — H54.8 LEGAL BLINDNESS, AS DEFINED IN USA: ICD-10-CM

## 2022-03-02 PROBLEM — Z00.00 ENCOUNTER FOR PREVENTIVE HEALTH EXAMINATION: Status: ACTIVE | Noted: 2022-03-02

## 2022-04-05 NOTE — OCCUPATIONAL THERAPY INITIAL EVALUATION ADULT - WEIGHT-BEARING RESTRICTIONS: BED/CHAIR, REHAB EVAL
LLE/weight-bearing as tolerated Burow's Graft Text: The defect edges were debeveled with a #15 scalpel blade.  Given the location of the defect, shape of the defect, the proximity to free margins and the presence of a standing cone deformity a Burow's skin graft was deemed most appropriate. The standing cone was removed and this tissue was then trimmed to the shape of the primary defect. The adipose tissue was also removed until only dermis and epidermis were left.  The skin margins of the secondary defect were undermined to an appropriate distance in all directions utilizing iris scissors.  The secondary defect was closed with interrupted buried subcutaneous sutures.  The skin edges were then re-apposed with running  sutures.  The skin graft was then placed in the primary defect and oriented appropriately.

## 2023-01-19 NOTE — OCCUPATIONAL THERAPY INITIAL EVALUATION ADULT - VISUAL ASSESSMENT: TRACKING
[facility]  HOSPITAL MEDICINE PROGRESS NOTE    Patient: Oriana Soriano Today's Date: 1/19/2023   YOB: 1951 Admission Date: 1/18/2023  5:46 PM   MRN: [id number] Inpatient LOS: 0 day(s)   Room:  [facility] Hospital Day:  Hospital Day: 2       History and Subjective complaints     Chief Complaint: HF    Interval history and Overnight events:  Feeling better after diuresis    Patient seen and examined by me in follow up.    Hospital Course  Patients interval history reviewed/EHR notes reviewed.   Oriana Soriano is a 71 year old female who presented on 1/18/2023 with complaints of Shortness of Breath and Dizziness         Reviewed Pertinent Histories: Medical History, Surgical History, Social History, Family History,     ROS: 12-point review of systems negative except as above.    Medications: Reviewed     Scheduled Medications:    sodium chloride (PF), 2 mL, 2 times per day  Potassium Standard Replacement Protocol (Levels 3.5 and lower), , See Admin Instructions  Potassium Replacement (Levels 3.6 - 4), , See Admin Instructions  Magnesium Standard Replacement Protocol, , See Admin Instructions  cephalexin, 500 mg, 4x Daily  furosemide, 20 mg, BID  WARFARIN - PHARMACIST MONITORED, , See Admin Instructions      Continuous Infusions:    Current Facility-Administered Medications   Medication Dose Route Frequency Provider Last Rate Last Admin     PRN Medications:    Current Facility-Administered Medications   Medication Dose Route Frequency Provider Last Rate Last Admin   • sodium chloride (NORMAL SALINE) 0.9 % bolus 500 mL  500 mL Intravenous PRN Edwin Schaffer MD       • sodium chloride 0.9 % flush bag 25 mL  25 mL Intravenous PRN Edwin Schaffer MD       • sodium chloride 0.9 % flush bag 25 mL  25 mL Intravenous PRN Edwin Schaffer MD       • ondansetron (ZOFRAN) injection 4 mg  4 mg Intravenous BID PRN Edwin Schaffer MD       • acetaminophen (TYLENOL) tablet 650 mg  650 mg Oral Q4H PRN  Pt is legally blind Edwin Schaffer MD             Physical Examination       Vital 24 Hour Range Most Recent Value   Temperature Temp  Min: 96.3 °F (35.7 °C)  Max: 98.3 °F (36.8 °C) 98.3 °F (36.8 °C)   Pulse Pulse  Min: 61  Max: 109 100   Respiratory Resp  Min: 14  Max: 24 (!) 22   Blood Pressure BP  Min: 103/75  Max: 147/67 119/61   Pulse Oximetry SpO2  Min: 92 %  Max: 96 % 94 %   Arterial BP No data recorded     O2 O2 Flow Rate (L/min)  Av L/min  Min: 0 L/min   Min taken time: 23  Max: 0 L/min   Max taken time: 23           Vital Most Recent Value First Value   Weight       Height 5' 4\" (162.6 cm) Height: 5' 4\" (162.6 cm)   BMI   N/A     General: Looks well, no apparent distress  CV: regular rate and rhythm  Resp:  Decreased breath sounds  Abd: soft and nontender  Ext: pitting edema present bilateral lower extremities  Neuro: no focal deficits noted  Psych: oriented to time, place and person    Test Results     Labs and Radiology: The Laboratory values listed below have been reviewed and pertinent findings discussed in the Assessment and Plan.          Tubes, Devices, Monitoring       Consults:    PHARMACY TO DOSE AND MONITOR WARFARIN    Diet:  Cardiac; Fluid Restrict 2000ml (1200 From Dietary) Diet  One Time Diet Cardiac; Fluid Restrict 2000ml (1200 From Dietary); Boarder Pt/ Please Deliver Tray To Ed G20 Thank You!  One Time Diet Cardiac; Fluid Restrict 2000ml (1200 From Dietary); Boarder Pt. Please Deliver To Rm P5 In The Ed. Thanks!        Assessment and Plan       1. Exacerbation of systolic heart failure -IV lasix 40 mg BID, daily weight  2. Chronic atrial fibrillation on warfarin - INR is supratherapeutic  3. COPD- no exacerbation   4.  Essential HTN- resume home meds      Advanced Directives     Code Status: Full Resuscitation      Discharge Plan     The patients treatment plans were discussed with patient.      Anticipated discharge destination: Home  Expected Discharge Date:   Barriers to  Discharge: Patient is not medically ready and needs to remain in the hospital today due to IV timbo You MD   Hospitalist  1/19/2023  1:45 PM    (Contact by secure chat)

## 2023-01-20 NOTE — ED ADULT NURSE NOTE - NSIMPLEMENTINTERV_GEN_ALL_ED
[Follow-Up] : a follow-up visit
Implemented All Fall Risk Interventions:  [place] to call system. Call bell, personal items and telephone within reach. Instruct patient to call for assistance. Room bathroom lighting operational. Non-slip footwear when patient is off stretcher. Physically safe environment: no spills, clutter or unnecessary equipment. Stretcher in lowest position, wheels locked, appropriate side rails in place. Provide visual cue, wrist band, yellow gown, etc. Monitor gait and stability. Monitor for mental status changes and reorient to person, place, and time. Review medications for side effects contributing to fall risk. Reinforce activity limits and safety measures with patient and family.

## 2023-02-24 NOTE — PROGRESS NOTE ADULT - SUBJECTIVE AND OBJECTIVE BOX
Subjective:  pt feels well with no cp nor sob and tolerated surgery well.  pt sitting in a chair in no distress.        MEDICATIONS  (STANDING):  enoxaparin Injectable 40 milliGRAM(s) SubCutaneous at bedtime  polyethylene glycol 3350 17 Gram(s) Oral at bedtime  senna 2 Tablet(s) Oral at bedtime    MEDICATIONS  (PRN):  oxyCODONE    IR 5 milliGRAM(s) Oral every 4 hours PRN Mild Pain (1 - 3)  oxyCODONE    IR 10 milliGRAM(s) Oral every 6 hours PRN Moderate Pain (4 - 6)            Vital Signs Last 24 Hrs  T(C): 37.2 (15 May 2021 13:55), Max: 37.2 (15 May 2021 13:55)  T(F): 98.9 (15 May 2021 13:55), Max: 98.9 (15 May 2021 13:55)  HR: 80 (15 May 2021 13:55) (80 - 89)  BP: 123/68 (15 May 2021 13:55) (123/68 - 152/77)  BP(mean): 103 (14 May 2021 18:00) (97 - 109)  RR: 20 (15 May 2021 13:55) (17 - 26)  SpO2: 96% (15 May 2021 13:55) (94% - 99%)             REVIEW OF SYSTEMS:  CONSTITUTIONAL: no fever, no chills, no diaphoresis  CARDIOLOGY: no chest pain, no SOB, no palpitation, no diaphoresis, no faint   RESPIRATORY: no dyspnea, no wheeze, no orthopnea, no PND   NEUROLOGICAL: no dizziness, headache, focal deficits to report.  GI: no abdominal pain, no dyspepsia, no nausea, no vomiting, no diarrhea.    HEENT: no congestion, no nasal bleeding  SKIN: no ecchymosis, no ptechia             PHYSICAL EXAM:  · CONSTITUTIONAL: Looks stable, in no diress  . NECK: Supple, no JVD, no bruit on either carotid side   · RESPIRATORY: Normal air entry to lung base, no wheeze, no crackle, no wet rales  · CARDIOVASCULAR: Normal S1, A2, P2, 2/6 mid peak anderson rusb, no click, regular rate,  no rub,  · EXTREMITIES: No cyanosis, no clubbing, no edema  · VASCULAR: Pulses are regular, equal, bilateral in upper and lower extremities  	  TELEMETRY:nsr    ECG:  Diagnosis Line Normal sinus rhythmwith sinus arrhythmia  Minimal voltage criteria for LVH, may be normal variant ( R in aVL )  Borderline ECG      TTE: 1. LV Ejection Fraction by Dennis's Method with a biplane EF of 70 %.   2. Moderate left ventricular hypertrophy.   3. Spectral Doppler shows impaired relaxation pattern of left ventricular myocardial filling (Grade I diastolic dysfunction).   4. There is moderate LVOT gradient.   5. Moderately enlarged left atrium.   6. Normal right atrial size.   7. Mitral annular calcification.   8. Thickening and calcification of the anterior and posterior mitral valve leaflets.   9. Trace mitral valve regurgitation.  10. Mild tricuspid regurgitation.  11. Peak transaortic gradient equals 73.1 mmHg, mean transaortic gradient equals 41.4 mmHg, the calculated aortic valve area equals 0.94 cm² by the continuity equation consistent with severe aortic stenosis.      LABS:                        11.4   12.58 )-----------( 124      ( 15 May 2021 07:55 )             34.4     05-15    142  |  105  |  10  ----------------------------<  111<H>  3.9   |  22  |  0.6<L>    Ca    9.3      15 May 2021 07:55  Phos  3.3     05-15  Mg     2.0     05-15    TPro  6.1  /  Alb  3.9  /  TBili  0.8  /  DBili  x   /  AST  30  /  ALT  12  /  AlkPhos  75  05-15            I&O's Summary    14 May 2021 07:01  -  15 May 2021 07:00  --------------------------------------------------------  IN: 290 mL / OUT: 200 mL / NET: 90 mL      BNP  RADIOLOGY & ADDITIONAL STUDIES:    IMPRESSION AND PLAN:         Quality 226: Preventive Care And Screening: Tobacco Use: Screening And Cessation Intervention: Patient screened for tobacco use, is a smoker AND did not receive Cessation Counseling during measurement period or in the six months prior to the measurement period Quality 111:Pneumonia Vaccination Status For Older Adults: Pneumococcal vaccine (PPSV23) was not administered on or after patient’s 60th birthday and before the end of the measurement period, reason not otherwise specified Detail Level: Detailed Quality 110: Preventive Care And Screening: Influenza Immunization: Influenza Immunization Administered during Influenza season

## 2023-04-12 NOTE — OCCUPATIONAL THERAPY INITIAL EVALUATION ADULT - PHYSICAL ASSIST/NONPHYSICAL ASSIST: GROOMING, OT EVAL
set-up required/verbal cues
no breast tenderness L/no breast tenderness R/no breast lump L/no breast lump R/no nipple discharge L/no nipple discharge R

## 2023-07-28 RX ORDER — ROSUVASTATIN CALCIUM 10 MG/1
10 TABLET, FILM COATED ORAL
Refills: 0 | Status: ACTIVE | COMMUNITY

## 2023-07-28 RX ORDER — ASCORBIC ACID 500 MG
TABLET ORAL
Refills: 0 | Status: ACTIVE | COMMUNITY

## 2023-07-28 RX ORDER — LATANOPROST/PF 0.005 %
0.01 DROPS OPHTHALMIC (EYE)
Refills: 0 | Status: ACTIVE | COMMUNITY

## 2023-07-28 RX ORDER — BRIMONIDINE TARTRATE, TIMOLOL MALEATE 2; 5 MG/ML; MG/ML
0.2-0.5 SOLUTION/ DROPS OPHTHALMIC
Refills: 0 | Status: ACTIVE | COMMUNITY

## 2023-07-28 RX ORDER — ASPIRIN 81 MG/1
81 TABLET, COATED ORAL
Refills: 0 | Status: ACTIVE | COMMUNITY

## 2023-08-03 ENCOUNTER — APPOINTMENT (OUTPATIENT)
Dept: CARDIOLOGY | Facility: CLINIC | Age: 83
End: 2023-08-03
Payer: MEDICARE

## 2023-08-03 ENCOUNTER — APPOINTMENT (OUTPATIENT)
Dept: CARDIOTHORACIC SURGERY | Facility: CLINIC | Age: 83
End: 2023-08-03
Payer: MEDICARE

## 2023-08-03 VITALS
SYSTOLIC BLOOD PRESSURE: 163 MMHG | RESPIRATION RATE: 12 BRPM | BODY MASS INDEX: 23.04 KG/M2 | HEIGHT: 63 IN | DIASTOLIC BLOOD PRESSURE: 90 MMHG | OXYGEN SATURATION: 96 % | WEIGHT: 130 LBS | HEART RATE: 65 BPM

## 2023-08-03 DIAGNOSIS — E78.5 HYPERLIPIDEMIA, UNSPECIFIED: ICD-10-CM

## 2023-08-03 DIAGNOSIS — Z87.891 PERSONAL HISTORY OF NICOTINE DEPENDENCE: ICD-10-CM

## 2023-08-03 DIAGNOSIS — Q24.4 CONGENITAL SUBAORTIC STENOSIS: ICD-10-CM

## 2023-08-03 PROCEDURE — 99203 OFFICE O/P NEW LOW 30 MIN: CPT

## 2023-08-03 PROCEDURE — 99204 OFFICE O/P NEW MOD 45 MIN: CPT

## 2023-08-03 RX ORDER — METOPROLOL SUCCINATE 25 MG/1
25 TABLET, EXTENDED RELEASE ORAL DAILY
Qty: 30 | Refills: 1 | Status: ACTIVE | COMMUNITY
Start: 2023-08-03 | End: 1900-01-01

## 2023-08-03 NOTE — PHYSICAL EXAM
[General Appearance - Alert] : alert [General Appearance - In No Acute Distress] : in no acute distress [Sclera] : the sclera and conjunctiva were normal [PERRL With Normal Accommodation] : pupils were equal in size, round, and reactive to light [Extraocular Movements] : extraocular movements were intact [Outer Ear] : the ears and nose were normal in appearance [Oropharynx] : the oropharynx was normal [Neck Appearance] : the appearance of the neck was normal [Neck Cervical Mass (___cm)] : no neck mass was observed [Jugular Venous Distention Increased] : there was no jugular-venous distention [Thyroid Diffuse Enlargement] : the thyroid was not enlarged [Thyroid Nodule] : there were no palpable thyroid nodules [Auscultation Breath Sounds / Voice Sounds] : lungs were clear to auscultation bilaterally [III] : a grade 3 [Bowel Sounds] : normal bowel sounds [Abdomen Soft] : soft [Abdomen Tenderness] : non-tender [Abdomen Mass (___ Cm)] : no abdominal mass palpated [Skin Color & Pigmentation] : normal skin color and pigmentation [Skin Turgor] : normal skin turgor [] : no rash [Deep Tendon Reflexes (DTR)] : deep tendon reflexes were 2+ and symmetric [Sensation] : the sensory exam was normal to light touch and pinprick [No Focal Deficits] : no focal deficits [Oriented To Time, Place, And Person] : oriented to person, place, and time [Impaired Insight] : insight and judgment were intact [Affect] : the affect was normal

## 2023-08-04 ENCOUNTER — APPOINTMENT (OUTPATIENT)
Dept: CARDIOLOGY | Facility: CLINIC | Age: 83
End: 2023-08-04

## 2023-08-04 VITALS
HEART RATE: 65 BPM | DIASTOLIC BLOOD PRESSURE: 90 MMHG | BODY MASS INDEX: 23.04 KG/M2 | SYSTOLIC BLOOD PRESSURE: 163 MMHG | HEIGHT: 63 IN | OXYGEN SATURATION: 96 % | RESPIRATION RATE: 12 BRPM | WEIGHT: 130 LBS | TEMPERATURE: 98 F

## 2023-08-06 PROBLEM — Q24.4 SUBAORTIC STENOSIS: Status: ACTIVE | Noted: 2023-08-06

## 2023-08-06 NOTE — ASSESSMENT
[FreeTextEntry1] : Ms. MANDO DELA CRUZ 83-year F, ex-smoker, here today for evaluation of their aortic stenosis. She has been following with her cardiologist for two years. She was told to follow up with her our team after her recent testing. PMHx of glaucoma, retinol occlusion, aortic stenosis. She is legally blind and requires an aid. She also does not take any medications. Symptoms are minimal. She is a clinical psychologist. All questions and concerns were addressed with the patient.  Plan: #Severe AS Plan for Initiation of Beta Blocker Cardiac Cath 1 month CTA TAVR protocol

## 2023-08-06 NOTE — END OF VISIT
[FreeTextEntry3] : No cardiac history.  Comorbidities as above.  Mild symptoms (exertional dyspnea), however, not very active. Recently retired clinical psychologist.  ECHO reviewed.  LVH with hyperdynamic LVSF.  Combined subvalvular (likely intracavitary) and valvular aortic stenosis.  Valvular component appears severe.  Hypertensive.  Plan to initiate beta-blocker. Obtain comprehensive ECHO with valvular / subvalvular assessment. Cath / CTA for TAVR planning.

## 2023-08-06 NOTE — PHYSICAL EXAM
[Well Developed] : well developed [Well Nourished] : well nourished [No Acute Distress] : no acute distress [Normal Conjunctiva] : normal conjunctiva [Normal Venous Pressure] : normal venous pressure [No Carotid Bruit] : no carotid bruit [Normal S1, S2] : normal S1, S2 [Good Air Entry] : good air entry [Clear Lung Fields] : clear lung fields [No Respiratory Distress] : no respiratory distress  [Soft] : abdomen soft [Non Tender] : non-tender [No Masses/organomegaly] : no masses/organomegaly [Normal Bowel Sounds] : normal bowel sounds [Normal Gait] : normal gait [No Edema] : no edema [No Cyanosis] : no cyanosis [No Clubbing] : no clubbing [No Varicosities] : no varicosities [No Rash] : no rash [No Skin Lesions] : no skin lesions [Moves all extremities] : moves all extremities [No Focal Deficits] : no focal deficits [Normal Speech] : normal speech [Alert and Oriented] : alert and oriented [Normal memory] : normal memory [de-identified] : RUSB IV

## 2023-08-06 NOTE — REASON FOR VISIT
[Symptom and Test Evaluation] : symptom and test evaluation [Structural Heart and Valve Disease] : structural heart and valve disease [FreeTextEntry1] : Ms. MANDO DELA CRUZ 83-year F, ex-smoker, here today for evaluation of their aortic stenosis. She has been following with her cardiologist for two years. She was told to follow up with her our team after her recent testing. PMHx of glaucoma, retinol occlusion, aortic stenosis. Symptoms are minimal. All questions and concerns were addressed with the patient.  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  Their healthcare team includes the following PMD: None Cardio: Ti

## 2023-08-11 DIAGNOSIS — I35.0 NONRHEUMATIC AORTIC (VALVE) STENOSIS: ICD-10-CM

## 2023-08-14 NOTE — HISTORY OF PRESENT ILLNESS
[FreeTextEntry1] : Ms. MANDO DELA CRUZ 83-year F, ex-smoker, here today for evaluation of their aortic stenosis. She has been following with her cardiologist for two years. She was told to follow up with her our team after her recent testing. PMHx of glaucoma, retinol occlusion, aortic stenosis. Symptoms are minimal. All questions and concerns were addressed with the patient.   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  Their healthcare team includes the following PMD: None Cardio: [place]  Pulmonary:

## 2023-08-14 NOTE — ASSESSMENT
[FreeTextEntry1] : Ms. MANDO DELA CRUZ 83-year F, ex-smoker, here today for evaluation of their aortic stenosis. She has been following with her cardiologist for two years. She was told to follow up with her our team after her recent testing. PMHx of glaucoma, retinol occlusion, aortic stenosis. Symptoms are minimal. All questions and concerns were addressed with the patient.   Plan: #Severe AS Plan for Initiation of Beta Blocker Cardiac Cath 1 month CTA TAVR protocol  I Pablito Taylor [facility] am acting as the scribe for Dr. Blevins  Patient seen and examined History and physical as per above Briefly, 83 year-old woman with AS/AI/moderate MR OTF and cardiac cath and RTC  I, Dr. Blevins, saw, examined, and reviewed the diagnostic images with the patient and agree with my nurse practitioners clinic note, physical exam findings, and treatment plan.  Matthias Blevins MD

## 2023-08-25 ENCOUNTER — NON-APPOINTMENT (OUTPATIENT)
Age: 83
End: 2023-08-25

## 2023-12-18 ENCOUNTER — INPATIENT (INPATIENT)
Facility: HOSPITAL | Age: 83
LOS: 2 days | Discharge: HOME | DRG: 307 | End: 2023-12-21
Attending: INTERNAL MEDICINE | Admitting: INTERNAL MEDICINE
Payer: MEDICARE

## 2023-12-18 VITALS
HEART RATE: 74 BPM | OXYGEN SATURATION: 98 % | SYSTOLIC BLOOD PRESSURE: 110 MMHG | DIASTOLIC BLOOD PRESSURE: 70 MMHG | RESPIRATION RATE: 16 BRPM | TEMPERATURE: 98 F | WEIGHT: 130.07 LBS

## 2023-12-18 DIAGNOSIS — R55 SYNCOPE AND COLLAPSE: ICD-10-CM

## 2023-12-18 LAB
ALBUMIN SERPL ELPH-MCNC: 4.7 G/DL — SIGNIFICANT CHANGE UP (ref 3.5–5.2)
ALBUMIN SERPL ELPH-MCNC: 4.7 G/DL — SIGNIFICANT CHANGE UP (ref 3.5–5.2)
ALP SERPL-CCNC: 94 U/L — SIGNIFICANT CHANGE UP (ref 30–115)
ALP SERPL-CCNC: 94 U/L — SIGNIFICANT CHANGE UP (ref 30–115)
ALT FLD-CCNC: 11 U/L — SIGNIFICANT CHANGE UP (ref 0–41)
ALT FLD-CCNC: 11 U/L — SIGNIFICANT CHANGE UP (ref 0–41)
ANION GAP SERPL CALC-SCNC: 13 MMOL/L — SIGNIFICANT CHANGE UP (ref 7–14)
ANION GAP SERPL CALC-SCNC: 13 MMOL/L — SIGNIFICANT CHANGE UP (ref 7–14)
AST SERPL-CCNC: 23 U/L — SIGNIFICANT CHANGE UP (ref 0–41)
AST SERPL-CCNC: 23 U/L — SIGNIFICANT CHANGE UP (ref 0–41)
BASOPHILS # BLD AUTO: 0.05 K/UL — SIGNIFICANT CHANGE UP (ref 0–0.2)
BASOPHILS # BLD AUTO: 0.05 K/UL — SIGNIFICANT CHANGE UP (ref 0–0.2)
BASOPHILS NFR BLD AUTO: 0.5 % — SIGNIFICANT CHANGE UP (ref 0–1)
BASOPHILS NFR BLD AUTO: 0.5 % — SIGNIFICANT CHANGE UP (ref 0–1)
BILIRUB SERPL-MCNC: 1.1 MG/DL — SIGNIFICANT CHANGE UP (ref 0.2–1.2)
BILIRUB SERPL-MCNC: 1.1 MG/DL — SIGNIFICANT CHANGE UP (ref 0.2–1.2)
BUN SERPL-MCNC: 14 MG/DL — SIGNIFICANT CHANGE UP (ref 10–20)
BUN SERPL-MCNC: 14 MG/DL — SIGNIFICANT CHANGE UP (ref 10–20)
CALCIUM SERPL-MCNC: 10.1 MG/DL — SIGNIFICANT CHANGE UP (ref 8.4–10.5)
CALCIUM SERPL-MCNC: 10.1 MG/DL — SIGNIFICANT CHANGE UP (ref 8.4–10.5)
CHLORIDE SERPL-SCNC: 103 MMOL/L — SIGNIFICANT CHANGE UP (ref 98–110)
CHLORIDE SERPL-SCNC: 103 MMOL/L — SIGNIFICANT CHANGE UP (ref 98–110)
CO2 SERPL-SCNC: 24 MMOL/L — SIGNIFICANT CHANGE UP (ref 17–32)
CO2 SERPL-SCNC: 24 MMOL/L — SIGNIFICANT CHANGE UP (ref 17–32)
CREAT SERPL-MCNC: 0.8 MG/DL — SIGNIFICANT CHANGE UP (ref 0.7–1.5)
CREAT SERPL-MCNC: 0.8 MG/DL — SIGNIFICANT CHANGE UP (ref 0.7–1.5)
EGFR: 72 ML/MIN/1.73M2 — SIGNIFICANT CHANGE UP
EGFR: 72 ML/MIN/1.73M2 — SIGNIFICANT CHANGE UP
EOSINOPHIL # BLD AUTO: 0.01 K/UL — SIGNIFICANT CHANGE UP (ref 0–0.7)
EOSINOPHIL # BLD AUTO: 0.01 K/UL — SIGNIFICANT CHANGE UP (ref 0–0.7)
EOSINOPHIL NFR BLD AUTO: 0.1 % — SIGNIFICANT CHANGE UP (ref 0–8)
EOSINOPHIL NFR BLD AUTO: 0.1 % — SIGNIFICANT CHANGE UP (ref 0–8)
GLUCOSE SERPL-MCNC: 128 MG/DL — HIGH (ref 70–99)
GLUCOSE SERPL-MCNC: 128 MG/DL — HIGH (ref 70–99)
HCT VFR BLD CALC: 45.4 % — SIGNIFICANT CHANGE UP (ref 37–47)
HCT VFR BLD CALC: 45.4 % — SIGNIFICANT CHANGE UP (ref 37–47)
HGB BLD-MCNC: 14.8 G/DL — SIGNIFICANT CHANGE UP (ref 12–16)
HGB BLD-MCNC: 14.8 G/DL — SIGNIFICANT CHANGE UP (ref 12–16)
IMM GRANULOCYTES NFR BLD AUTO: 0.2 % — SIGNIFICANT CHANGE UP (ref 0.1–0.3)
IMM GRANULOCYTES NFR BLD AUTO: 0.2 % — SIGNIFICANT CHANGE UP (ref 0.1–0.3)
LYMPHOCYTES # BLD AUTO: 0.85 K/UL — LOW (ref 1.2–3.4)
LYMPHOCYTES # BLD AUTO: 0.85 K/UL — LOW (ref 1.2–3.4)
LYMPHOCYTES # BLD AUTO: 8.2 % — LOW (ref 20.5–51.1)
LYMPHOCYTES # BLD AUTO: 8.2 % — LOW (ref 20.5–51.1)
MCHC RBC-ENTMCNC: 30.1 PG — SIGNIFICANT CHANGE UP (ref 27–31)
MCHC RBC-ENTMCNC: 30.1 PG — SIGNIFICANT CHANGE UP (ref 27–31)
MCHC RBC-ENTMCNC: 32.6 G/DL — SIGNIFICANT CHANGE UP (ref 32–37)
MCHC RBC-ENTMCNC: 32.6 G/DL — SIGNIFICANT CHANGE UP (ref 32–37)
MCV RBC AUTO: 92.3 FL — SIGNIFICANT CHANGE UP (ref 81–99)
MCV RBC AUTO: 92.3 FL — SIGNIFICANT CHANGE UP (ref 81–99)
MONOCYTES # BLD AUTO: 0.7 K/UL — HIGH (ref 0.1–0.6)
MONOCYTES # BLD AUTO: 0.7 K/UL — HIGH (ref 0.1–0.6)
MONOCYTES NFR BLD AUTO: 6.8 % — SIGNIFICANT CHANGE UP (ref 1.7–9.3)
MONOCYTES NFR BLD AUTO: 6.8 % — SIGNIFICANT CHANGE UP (ref 1.7–9.3)
NEUTROPHILS # BLD AUTO: 8.74 K/UL — HIGH (ref 1.4–6.5)
NEUTROPHILS # BLD AUTO: 8.74 K/UL — HIGH (ref 1.4–6.5)
NEUTROPHILS NFR BLD AUTO: 84.2 % — HIGH (ref 42.2–75.2)
NEUTROPHILS NFR BLD AUTO: 84.2 % — HIGH (ref 42.2–75.2)
NRBC # BLD: 0 /100 WBCS — SIGNIFICANT CHANGE UP (ref 0–0)
NRBC # BLD: 0 /100 WBCS — SIGNIFICANT CHANGE UP (ref 0–0)
NT-PROBNP SERPL-SCNC: 298 PG/ML — SIGNIFICANT CHANGE UP (ref 0–300)
NT-PROBNP SERPL-SCNC: 298 PG/ML — SIGNIFICANT CHANGE UP (ref 0–300)
PLATELET # BLD AUTO: 140 K/UL — SIGNIFICANT CHANGE UP (ref 130–400)
PLATELET # BLD AUTO: 140 K/UL — SIGNIFICANT CHANGE UP (ref 130–400)
PMV BLD: 12.8 FL — HIGH (ref 7.4–10.4)
PMV BLD: 12.8 FL — HIGH (ref 7.4–10.4)
POTASSIUM SERPL-MCNC: 4.5 MMOL/L — SIGNIFICANT CHANGE UP (ref 3.5–5)
POTASSIUM SERPL-MCNC: 4.5 MMOL/L — SIGNIFICANT CHANGE UP (ref 3.5–5)
POTASSIUM SERPL-SCNC: 4.5 MMOL/L — SIGNIFICANT CHANGE UP (ref 3.5–5)
POTASSIUM SERPL-SCNC: 4.5 MMOL/L — SIGNIFICANT CHANGE UP (ref 3.5–5)
PROT SERPL-MCNC: 7.6 G/DL — SIGNIFICANT CHANGE UP (ref 6–8)
PROT SERPL-MCNC: 7.6 G/DL — SIGNIFICANT CHANGE UP (ref 6–8)
RBC # BLD: 4.92 M/UL — SIGNIFICANT CHANGE UP (ref 4.2–5.4)
RBC # BLD: 4.92 M/UL — SIGNIFICANT CHANGE UP (ref 4.2–5.4)
RBC # FLD: 13.4 % — SIGNIFICANT CHANGE UP (ref 11.5–14.5)
RBC # FLD: 13.4 % — SIGNIFICANT CHANGE UP (ref 11.5–14.5)
SODIUM SERPL-SCNC: 140 MMOL/L — SIGNIFICANT CHANGE UP (ref 135–146)
SODIUM SERPL-SCNC: 140 MMOL/L — SIGNIFICANT CHANGE UP (ref 135–146)
TROPONIN T, HIGH SENSITIVITY RESULT: 19 NG/L — HIGH (ref 6–13)
TROPONIN T, HIGH SENSITIVITY RESULT: 19 NG/L — HIGH (ref 6–13)
WBC # BLD: 10.37 K/UL — SIGNIFICANT CHANGE UP (ref 4.8–10.8)
WBC # BLD: 10.37 K/UL — SIGNIFICANT CHANGE UP (ref 4.8–10.8)
WBC # FLD AUTO: 10.37 K/UL — SIGNIFICANT CHANGE UP (ref 4.8–10.8)
WBC # FLD AUTO: 10.37 K/UL — SIGNIFICANT CHANGE UP (ref 4.8–10.8)

## 2023-12-18 PROCEDURE — 99223 1ST HOSP IP/OBS HIGH 75: CPT

## 2023-12-18 PROCEDURE — 84443 ASSAY THYROID STIM HORMONE: CPT

## 2023-12-18 PROCEDURE — 71045 X-RAY EXAM CHEST 1 VIEW: CPT | Mod: 26

## 2023-12-18 PROCEDURE — 70450 CT HEAD/BRAIN W/O DYE: CPT | Mod: 26

## 2023-12-18 PROCEDURE — 80053 COMPREHEN METABOLIC PANEL: CPT

## 2023-12-18 PROCEDURE — 84484 ASSAY OF TROPONIN QUANT: CPT

## 2023-12-18 PROCEDURE — 93306 TTE W/DOPPLER COMPLETE: CPT

## 2023-12-18 PROCEDURE — 85730 THROMBOPLASTIN TIME PARTIAL: CPT

## 2023-12-18 PROCEDURE — 99285 EMERGENCY DEPT VISIT HI MDM: CPT | Mod: GC

## 2023-12-18 PROCEDURE — 70450 CT HEAD/BRAIN W/O DYE: CPT

## 2023-12-18 PROCEDURE — 93010 ELECTROCARDIOGRAM REPORT: CPT

## 2023-12-18 PROCEDURE — 80061 LIPID PANEL: CPT

## 2023-12-18 PROCEDURE — 97162 PT EVAL MOD COMPLEX 30 MIN: CPT | Mod: GP

## 2023-12-18 PROCEDURE — 85025 COMPLETE CBC W/AUTO DIFF WBC: CPT

## 2023-12-18 PROCEDURE — 83735 ASSAY OF MAGNESIUM: CPT

## 2023-12-18 PROCEDURE — 36415 COLL VENOUS BLD VENIPUNCTURE: CPT

## 2023-12-18 PROCEDURE — 83036 HEMOGLOBIN GLYCOSYLATED A1C: CPT

## 2023-12-18 PROCEDURE — 85610 PROTHROMBIN TIME: CPT

## 2023-12-18 RX ORDER — ROSUVASTATIN CALCIUM 5 MG/1
1 TABLET ORAL
Refills: 0 | DISCHARGE

## 2023-12-18 RX ORDER — ATORVASTATIN CALCIUM 80 MG/1
40 TABLET, FILM COATED ORAL AT BEDTIME
Refills: 0 | Status: DISCONTINUED | OUTPATIENT
Start: 2023-12-18 | End: 2023-12-21

## 2023-12-18 RX ORDER — ASPIRIN/CALCIUM CARB/MAGNESIUM 324 MG
1 TABLET ORAL
Qty: 0 | Refills: 0 | DISCHARGE

## 2023-12-18 RX ORDER — ENOXAPARIN SODIUM 100 MG/ML
40 INJECTION SUBCUTANEOUS EVERY 24 HOURS
Refills: 0 | Status: DISCONTINUED | OUTPATIENT
Start: 2023-12-18 | End: 2023-12-21

## 2023-12-18 NOTE — H&P ADULT - ATTENDING COMMENTS
Patient seen and examined at bedside independently of the residents. I read the resident's note and agree with the plan with the additions and corrections as noted below. My note supersedes the resident's note.     REVIEW OF SYSTEMS:  Negative except in HPI.     PMH: Severe AS, Legal blindness 2/2 glaucoma    FHx: Reviewed. No fhx of asthma/copd, No fhx of liver and pulmonary disease. No fhx of hematological disorder.     Physical Exam:  GEN: No acute distress. Awake, Alert and oriented x 3.   Head: Atraumatic, Normocephalic.   Eye:  No sclera icterus. legally blind.   ENT: Normal oropharynx, no thyromegaly, no mass, no lymphadenopathy.   LUNGS: Clear to auscultation bilaterally. No wheeze/rales/crackles.   HEART: Normal. S1/S2 present. RRR. + grade 3 systolic murmur.   ABD: Soft, non-tender, non-distended. Bowel sounds present.   EXT: No pitting edema. No erythema. No tenderness.  Integumentary: No rash, No sore, No petechia.   NEURO: CN III-XII intact. Strength: 5/5 b/l ULE. Sensory intact b/l ULE.     Vital Signs Last 24 Hrs  T(C): 36.7 (18 Dec 2023 16:11), Max: 36.8 (18 Dec 2023 11:11)  T(F): 98.1 (18 Dec 2023 16:11), Max: 98.2 (18 Dec 2023 11:11)  HR: 72 (18 Dec 2023 16:11) (72 - 74)  BP: 181/92 (18 Dec 2023 16:11) (110/70 - 181/92)  BP(mean): --  RR: 16 (18 Dec 2023 16:11) (16 - 16)  SpO2: 97% (18 Dec 2023 16:11) (97% - 98%)    Parameters below as of 18 Dec 2023 16:11  Patient On (Oxygen Delivery Method): room air      Please see the above notes for Labs and radiology.     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     # Syncope likely cardiac in nature   # Severe AS   - EKG: NSR. Non ischemic.   - 1st Troponin 19. Check 2nd Troponin.  - check orthostatic VS   - check CTH, TTE   - fall precaution.   - PT/Rehab.   - monitor on Telemetry.  - Cardiology consult with Dr. Luke     DVT ppx: Lovenox SC  GI ppx: PPI  Diet: DASH diet  Activity: as tolerated.    Date seen by the attendin2023  Total time spent: 75 minutes.

## 2023-12-18 NOTE — ED PROVIDER NOTE - OBJECTIVE STATEMENT
Pt is an 83-year-old female with past medical history of unspecified valve problem and legal blindness from glaucoma brought in by EMS for syncope.  Caretaker is at bedside.  States patient was eating her breakfast this morning when she passed out in her chair for 1 to 2 minutes, woke up and had 1 episode of nonbilious nonbloody vomiting. Pt states she follows cardiology in [place] and needs a valve replacement procedure.  Patient used to follow Dr. Smith at [facility].  Currently patient has no complaints and states she does not remember passing out.  Denies any presyncopal symptoms.    Denies any nausea, diarrhea, abdominal pain, chest pain, shortness of breath, lightheadedness, dizziness Pt is an 83-year-old female with past medical history of unspecified valve problem and legal blindness from glaucoma brought in by EMS for syncope.  Caretaker is at bedside.  States patient was eating her breakfast this morning when she passed out in her chair for 1 to 2 minutes, woke up and had 1 episode of nonbilious nonbloody vomiting. Pt states she follows cardiology in [place] and needs a valve replacement procedure.  Patient used to follow Dr. Smith at [facility].  Currently patient has no complaints and states she does not remember passing out.  Denies any presyncopal symptoms.    Denies any nausea, diarrhea, abdominal pain, chest pain, shortness of breath, lightheadedness, dizziness

## 2023-12-18 NOTE — ED PROVIDER NOTE - PHYSICAL EXAMINATION
CONSTITUTIONAL: well-appearing, in NAD  SKIN: Warm dry, normal skin turgor  HEAD: NCAT  EYES: EOMI, PERRLA, no scleral icterus, conjunctiva pink  ENT: normal pharynx with no erythema or exudates  NECK: Supple; non tender. Full ROM.  CARD: RRR, Murmur present   RESP: clear to ausculation b/l. No crackles or wheezing.  ABD: soft, non-tender, non-distended, no rebound or guarding.  EXT: Full ROM, no bony tenderness, no pedal edema, no calf tenderness  NEURO: normal motor. normal sensory.  PSYCH: Cooperative, appropriate.

## 2023-12-18 NOTE — ED PROVIDER NOTE - CLINICAL SUMMARY MEDICAL DECISION MAKING FREE TEXT BOX
Labs and EKG were ordered and reviewed.  Imaging was ordered and reviewed by me.  Appropriate medications for patient's presenting complaints were ordered and effects were reassessed.  Patient's records (prior hospital, ED visit, and/or nursing home notes if available) were reviewed.  Additional history was obtained from EMS, family, and/or PCP (where available).  Escalation to admission/observation was considered.  Patient requires inpatient hospitalization - monitored setting.  Suspicious for severe AS as the cause of her syncope.  Patient is not a good candidate for obs as I feel she may need more urgent intervention.

## 2023-12-18 NOTE — H&P ADULT - NSHPPHYSICALEXAM_GEN_ALL_CORE
VITALS:   T(C): 36.7 (12-18-23 @ 16:11), Max: 36.8 (12-18-23 @ 11:11)  HR: 72 (12-18-23 @ 16:11) (72 - 74)  BP: 181/92 (12-18-23 @ 16:11) (110/70 - 181/92)  RR: 16 (12-18-23 @ 16:11) (16 - 16)  SpO2: 97% (12-18-23 @ 16:11) (97% - 98%)    GENERAL: NAD, lying comfortably in bed  LUNG: Clear to auscultation bilaterally, No  rhonchi, No wheezing  HEART: Regular rate and rhythm, No murmurs  ABDOMEN: Soft, nontender, nondistended  EXTREMITIES:  No edema  NERVOUS SYSTEM:  A&Ox3  SKIN: No rashes or lesions

## 2023-12-18 NOTE — H&P ADULT - HISTORY OF PRESENT ILLNESS
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 She does not remember what happened but her aide was with her. She was eating her breakfast this morning when she passed out in her chair for 1 to 2 minutes, woke up and had 1 episode of nonbilious nonbloody vomiting. No reported eye rolling / shaking / tonic-clonic movements. No urinary / fecal incontinence . Patient was being prepped for TAVR at [facility] soon (she saw Dr. Simth and Dr. Crabtree at [facility]). Daughter at bedside reports coordinating with [facility] for possible transfer so that they can do the TAVR earlier. Currently patient denies any dizziness / sob / chest pain / fever / chills / GI /  symptoms.     In ED: BP : 110/70, HR: 74, RR: 16, temp: 98.2 , spo2: 98% RA  Labs within normal limits. 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 She does not remember what happened but her aide was with her. She was eating her breakfast this morning when she passed out in her chair for 1 to 2 minutes, woke up and had 1 episode of nonbilious nonbloody vomiting. No reported eye rolling / shaking / tonic-clonic movements. No urinary / fecal incontinence . Patient was being prepped for TAVR at [facility] soon (she saw Dr. Smith and Dr. Crabtree at [facility]). Daughter at bedside reports coordinating with [facility] for possible transfer so that they can do the TAVR earlier. Currently patient denies any dizziness / sob / chest pain / fever / chills / GI /  symptoms.     In ED: BP : 110/70, HR: 74, RR: 16, temp: 98.2 , spo2: 98% RA  Labs within normal limits. 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 She does not remember what happened but her aide was with her. She was eating her breakfast this morning when she passed out in her chair for 1 to 2 minutes, woke up and had 1 episode of nonbilious nonbloody vomiting. No reported eye rolling / shaking / tonic-clonic movements. No urinary / fecal incontinence. As per daughter, patient had a syncopal episode about 3-4 weeks ago, woke up on the floor but didn't remember anything.     Patient was being prepped for TAVR at [facility] soon (she saw Dr. Smith and Dr. Crabtree at [facility]). Daughter at bedside reports coordinating with [facility] for possible transfer so that they can do the TAVR earlier. Currently patient denies any dizziness / sob / chest pain / fever / chills / GI /  symptoms.     In ED: BP : 110/70, HR: 74, RR: 16, temp: 98.2 , spo2: 98% RA  Labs within normal limits. 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 She does not remember what happened but her aide was with her. She was eating her breakfast this morning when she passed out in her chair for 1 to 2 minutes, woke up and had 1 episode of nonbilious nonbloody vomiting. No reported eye rolling / shaking / tonic-clonic movements. No urinary / fecal incontinence. As per daughter, patient had a syncopal episode about 3-4 weeks ago, woke up on the floor but didn't remember anything.     Patient was being prepped for TAVR at [facility] soon (she saw Dr. Smith and Dr. Crabtree at [facility]). Daughter at bedside reports coordinating with [facility] for possible transfer so that they can do the TAVR earlier. Currently patient denies any dizziness / sob / chest pain / fever / chills / GI /  symptoms.     In ED: BP : 110/70, HR: 74, RR: 16, temp: 98.2 , spo2: 98% RA  Labs within normal limits. CXR clear  Admitted to tele

## 2023-12-18 NOTE — H&P ADULT - ASSESSMENT
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     #Syncope, likely secondary to severe AS  #Planned for TAVR at [facility]  - trop negative, trend   - check TTE  - check TSH, orthostatic vitals  - check CT head  - monitor on tele   - consider cardio consult after TTE if patient is not transferred to [facility] as per daughter's request  - cosider EEG as part of syncope w/u, no signs of seizure   - PT consult   - patient was prescribed metoprolol at some point but reports her only current home medication is atorvastatin     DIET: regular  DVT prophylaxis: lovenox  CODE: full  DISPO: tele   83 year old known to have severe AS, 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     #Syncope, likely secondary to severe AS  #Planned for TAVR at [facility]  - trop negative, trend   - check TTE  - check TSH, orthostatic vitals  - check CT head  - monitor on tele   - consider cardio consult after TTE if patient is not transferred to [facility] as per daughter's request  - cosider EEG as part of syncope w/u, no signs of seizure   - PT consult   - patient was prescribed metoprolol at some point but reports her only current home medication is atorvastatin     DIET: regular  DVT prophylaxis: lovenox  CODE: full  DISPO: tele

## 2023-12-18 NOTE — ED ADULT TRIAGE NOTE - CHIEF COMPLAINT QUOTE
pt BIBA s/p syncopal episode at home. pt vomited after eating this morning then synopized for approx 5-10 seconds. pt never fell to ground. awake and alert in triage. pt BIBA s/p syncopal episode at home. pt vomited after eating this morning then synopized for approx 5-10 seconds. pt never fell to ground. awake and alert in triage.

## 2023-12-19 ENCOUNTER — TRANSCRIPTION ENCOUNTER (OUTPATIENT)
Age: 83
End: 2023-12-19

## 2023-12-19 LAB
A1C WITH ESTIMATED AVERAGE GLUCOSE RESULT: 5.9 % — HIGH (ref 4–5.6)
A1C WITH ESTIMATED AVERAGE GLUCOSE RESULT: 5.9 % — HIGH (ref 4–5.6)
ALBUMIN SERPL ELPH-MCNC: 4.5 G/DL — SIGNIFICANT CHANGE UP (ref 3.5–5.2)
ALBUMIN SERPL ELPH-MCNC: 4.5 G/DL — SIGNIFICANT CHANGE UP (ref 3.5–5.2)
ALP SERPL-CCNC: 87 U/L — SIGNIFICANT CHANGE UP (ref 30–115)
ALP SERPL-CCNC: 87 U/L — SIGNIFICANT CHANGE UP (ref 30–115)
ALT FLD-CCNC: 10 U/L — SIGNIFICANT CHANGE UP (ref 0–41)
ALT FLD-CCNC: 10 U/L — SIGNIFICANT CHANGE UP (ref 0–41)
ANION GAP SERPL CALC-SCNC: 10 MMOL/L — SIGNIFICANT CHANGE UP (ref 7–14)
ANION GAP SERPL CALC-SCNC: 10 MMOL/L — SIGNIFICANT CHANGE UP (ref 7–14)
APTT BLD: 35.5 SEC — SIGNIFICANT CHANGE UP (ref 27–39.2)
APTT BLD: 35.5 SEC — SIGNIFICANT CHANGE UP (ref 27–39.2)
AST SERPL-CCNC: 18 U/L — SIGNIFICANT CHANGE UP (ref 0–41)
AST SERPL-CCNC: 18 U/L — SIGNIFICANT CHANGE UP (ref 0–41)
BASOPHILS # BLD AUTO: 0.04 K/UL — SIGNIFICANT CHANGE UP (ref 0–0.2)
BASOPHILS # BLD AUTO: 0.04 K/UL — SIGNIFICANT CHANGE UP (ref 0–0.2)
BASOPHILS NFR BLD AUTO: 0.5 % — SIGNIFICANT CHANGE UP (ref 0–1)
BASOPHILS NFR BLD AUTO: 0.5 % — SIGNIFICANT CHANGE UP (ref 0–1)
BILIRUB SERPL-MCNC: 1.1 MG/DL — SIGNIFICANT CHANGE UP (ref 0.2–1.2)
BILIRUB SERPL-MCNC: 1.1 MG/DL — SIGNIFICANT CHANGE UP (ref 0.2–1.2)
BUN SERPL-MCNC: 13 MG/DL — SIGNIFICANT CHANGE UP (ref 10–20)
BUN SERPL-MCNC: 13 MG/DL — SIGNIFICANT CHANGE UP (ref 10–20)
CALCIUM SERPL-MCNC: 9.9 MG/DL — SIGNIFICANT CHANGE UP (ref 8.4–10.5)
CALCIUM SERPL-MCNC: 9.9 MG/DL — SIGNIFICANT CHANGE UP (ref 8.4–10.5)
CHLORIDE SERPL-SCNC: 107 MMOL/L — SIGNIFICANT CHANGE UP (ref 98–110)
CHLORIDE SERPL-SCNC: 107 MMOL/L — SIGNIFICANT CHANGE UP (ref 98–110)
CHOLEST SERPL-MCNC: 184 MG/DL — SIGNIFICANT CHANGE UP
CHOLEST SERPL-MCNC: 184 MG/DL — SIGNIFICANT CHANGE UP
CO2 SERPL-SCNC: 27 MMOL/L — SIGNIFICANT CHANGE UP (ref 17–32)
CO2 SERPL-SCNC: 27 MMOL/L — SIGNIFICANT CHANGE UP (ref 17–32)
CREAT SERPL-MCNC: 0.6 MG/DL — LOW (ref 0.7–1.5)
CREAT SERPL-MCNC: 0.6 MG/DL — LOW (ref 0.7–1.5)
EGFR: 88 ML/MIN/1.73M2 — SIGNIFICANT CHANGE UP
EGFR: 88 ML/MIN/1.73M2 — SIGNIFICANT CHANGE UP
EOSINOPHIL # BLD AUTO: 0.04 K/UL — SIGNIFICANT CHANGE UP (ref 0–0.7)
EOSINOPHIL # BLD AUTO: 0.04 K/UL — SIGNIFICANT CHANGE UP (ref 0–0.7)
EOSINOPHIL NFR BLD AUTO: 0.5 % — SIGNIFICANT CHANGE UP (ref 0–8)
EOSINOPHIL NFR BLD AUTO: 0.5 % — SIGNIFICANT CHANGE UP (ref 0–8)
ESTIMATED AVERAGE GLUCOSE: 123 MG/DL — HIGH (ref 68–114)
ESTIMATED AVERAGE GLUCOSE: 123 MG/DL — HIGH (ref 68–114)
GLUCOSE SERPL-MCNC: 91 MG/DL — SIGNIFICANT CHANGE UP (ref 70–99)
GLUCOSE SERPL-MCNC: 91 MG/DL — SIGNIFICANT CHANGE UP (ref 70–99)
HCT VFR BLD CALC: 46.7 % — SIGNIFICANT CHANGE UP (ref 37–47)
HCT VFR BLD CALC: 46.7 % — SIGNIFICANT CHANGE UP (ref 37–47)
HDLC SERPL-MCNC: 76 MG/DL — SIGNIFICANT CHANGE UP
HDLC SERPL-MCNC: 76 MG/DL — SIGNIFICANT CHANGE UP
HGB BLD-MCNC: 15 G/DL — SIGNIFICANT CHANGE UP (ref 12–16)
HGB BLD-MCNC: 15 G/DL — SIGNIFICANT CHANGE UP (ref 12–16)
IMM GRANULOCYTES NFR BLD AUTO: 0.3 % — SIGNIFICANT CHANGE UP (ref 0.1–0.3)
IMM GRANULOCYTES NFR BLD AUTO: 0.3 % — SIGNIFICANT CHANGE UP (ref 0.1–0.3)
INR BLD: 1.01 RATIO — SIGNIFICANT CHANGE UP (ref 0.65–1.3)
INR BLD: 1.01 RATIO — SIGNIFICANT CHANGE UP (ref 0.65–1.3)
LIPID PNL WITH DIRECT LDL SERPL: 94 MG/DL — SIGNIFICANT CHANGE UP
LIPID PNL WITH DIRECT LDL SERPL: 94 MG/DL — SIGNIFICANT CHANGE UP
LYMPHOCYTES # BLD AUTO: 2.21 K/UL — SIGNIFICANT CHANGE UP (ref 1.2–3.4)
LYMPHOCYTES # BLD AUTO: 2.21 K/UL — SIGNIFICANT CHANGE UP (ref 1.2–3.4)
LYMPHOCYTES # BLD AUTO: 25.3 % — SIGNIFICANT CHANGE UP (ref 20.5–51.1)
LYMPHOCYTES # BLD AUTO: 25.3 % — SIGNIFICANT CHANGE UP (ref 20.5–51.1)
MAGNESIUM SERPL-MCNC: 2.3 MG/DL — SIGNIFICANT CHANGE UP (ref 1.8–2.4)
MAGNESIUM SERPL-MCNC: 2.3 MG/DL — SIGNIFICANT CHANGE UP (ref 1.8–2.4)
MCHC RBC-ENTMCNC: 30.4 PG — SIGNIFICANT CHANGE UP (ref 27–31)
MCHC RBC-ENTMCNC: 30.4 PG — SIGNIFICANT CHANGE UP (ref 27–31)
MCHC RBC-ENTMCNC: 32.1 G/DL — SIGNIFICANT CHANGE UP (ref 32–37)
MCHC RBC-ENTMCNC: 32.1 G/DL — SIGNIFICANT CHANGE UP (ref 32–37)
MCV RBC AUTO: 94.7 FL — SIGNIFICANT CHANGE UP (ref 81–99)
MCV RBC AUTO: 94.7 FL — SIGNIFICANT CHANGE UP (ref 81–99)
MONOCYTES # BLD AUTO: 0.94 K/UL — HIGH (ref 0.1–0.6)
MONOCYTES # BLD AUTO: 0.94 K/UL — HIGH (ref 0.1–0.6)
MONOCYTES NFR BLD AUTO: 10.7 % — HIGH (ref 1.7–9.3)
MONOCYTES NFR BLD AUTO: 10.7 % — HIGH (ref 1.7–9.3)
NEUTROPHILS # BLD AUTO: 5.49 K/UL — SIGNIFICANT CHANGE UP (ref 1.4–6.5)
NEUTROPHILS # BLD AUTO: 5.49 K/UL — SIGNIFICANT CHANGE UP (ref 1.4–6.5)
NEUTROPHILS NFR BLD AUTO: 62.7 % — SIGNIFICANT CHANGE UP (ref 42.2–75.2)
NEUTROPHILS NFR BLD AUTO: 62.7 % — SIGNIFICANT CHANGE UP (ref 42.2–75.2)
NON HDL CHOLESTEROL: 108 MG/DL — SIGNIFICANT CHANGE UP
NON HDL CHOLESTEROL: 108 MG/DL — SIGNIFICANT CHANGE UP
NRBC # BLD: 0 /100 WBCS — SIGNIFICANT CHANGE UP (ref 0–0)
NRBC # BLD: 0 /100 WBCS — SIGNIFICANT CHANGE UP (ref 0–0)
PLATELET # BLD AUTO: 138 K/UL — SIGNIFICANT CHANGE UP (ref 130–400)
PLATELET # BLD AUTO: 138 K/UL — SIGNIFICANT CHANGE UP (ref 130–400)
PMV BLD: 12.8 FL — HIGH (ref 7.4–10.4)
PMV BLD: 12.8 FL — HIGH (ref 7.4–10.4)
POTASSIUM SERPL-MCNC: 4.5 MMOL/L — SIGNIFICANT CHANGE UP (ref 3.5–5)
POTASSIUM SERPL-MCNC: 4.5 MMOL/L — SIGNIFICANT CHANGE UP (ref 3.5–5)
POTASSIUM SERPL-SCNC: 4.5 MMOL/L — SIGNIFICANT CHANGE UP (ref 3.5–5)
POTASSIUM SERPL-SCNC: 4.5 MMOL/L — SIGNIFICANT CHANGE UP (ref 3.5–5)
PROT SERPL-MCNC: 7.6 G/DL — SIGNIFICANT CHANGE UP (ref 6–8)
PROT SERPL-MCNC: 7.6 G/DL — SIGNIFICANT CHANGE UP (ref 6–8)
PROTHROM AB SERPL-ACNC: 11.5 SEC — SIGNIFICANT CHANGE UP (ref 9.95–12.87)
PROTHROM AB SERPL-ACNC: 11.5 SEC — SIGNIFICANT CHANGE UP (ref 9.95–12.87)
RBC # BLD: 4.93 M/UL — SIGNIFICANT CHANGE UP (ref 4.2–5.4)
RBC # BLD: 4.93 M/UL — SIGNIFICANT CHANGE UP (ref 4.2–5.4)
RBC # FLD: 13.7 % — SIGNIFICANT CHANGE UP (ref 11.5–14.5)
RBC # FLD: 13.7 % — SIGNIFICANT CHANGE UP (ref 11.5–14.5)
SODIUM SERPL-SCNC: 144 MMOL/L — SIGNIFICANT CHANGE UP (ref 135–146)
SODIUM SERPL-SCNC: 144 MMOL/L — SIGNIFICANT CHANGE UP (ref 135–146)
TRIGL SERPL-MCNC: 75 MG/DL — SIGNIFICANT CHANGE UP
TRIGL SERPL-MCNC: 75 MG/DL — SIGNIFICANT CHANGE UP
TROPONIN T, HIGH SENSITIVITY RESULT: 19 NG/L — HIGH (ref 6–13)
TROPONIN T, HIGH SENSITIVITY RESULT: 19 NG/L — HIGH (ref 6–13)
TROPONIN T, HIGH SENSITIVITY RESULT: 22 NG/L — HIGH (ref 6–13)
TROPONIN T, HIGH SENSITIVITY RESULT: 22 NG/L — HIGH (ref 6–13)
TSH SERPL-MCNC: 1.01 UIU/ML — SIGNIFICANT CHANGE UP (ref 0.27–4.2)
TSH SERPL-MCNC: 1.01 UIU/ML — SIGNIFICANT CHANGE UP (ref 0.27–4.2)
WBC # BLD: 8.75 K/UL — SIGNIFICANT CHANGE UP (ref 4.8–10.8)
WBC # BLD: 8.75 K/UL — SIGNIFICANT CHANGE UP (ref 4.8–10.8)
WBC # FLD AUTO: 8.75 K/UL — SIGNIFICANT CHANGE UP (ref 4.8–10.8)
WBC # FLD AUTO: 8.75 K/UL — SIGNIFICANT CHANGE UP (ref 4.8–10.8)

## 2023-12-19 PROCEDURE — 99232 SBSQ HOSP IP/OBS MODERATE 35: CPT

## 2023-12-19 RX ORDER — LOSARTAN POTASSIUM 100 MG/1
25 TABLET, FILM COATED ORAL DAILY
Refills: 0 | Status: DISCONTINUED | OUTPATIENT
Start: 2023-12-19 | End: 2023-12-21

## 2023-12-19 RX ORDER — INFLUENZA VIRUS VACCINE 15; 15; 15; 15 UG/.5ML; UG/.5ML; UG/.5ML; UG/.5ML
0.7 SUSPENSION INTRAMUSCULAR ONCE
Refills: 0 | Status: DISCONTINUED | OUTPATIENT
Start: 2023-12-19 | End: 2023-12-21

## 2023-12-19 RX ADMIN — LOSARTAN POTASSIUM 25 MILLIGRAM(S): 100 TABLET, FILM COATED ORAL at 10:09

## 2023-12-19 RX ADMIN — ATORVASTATIN CALCIUM 40 MILLIGRAM(S): 80 TABLET, FILM COATED ORAL at 00:11

## 2023-12-19 NOTE — DISCHARGE NOTE PROVIDER - HOSPITAL COURSE
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     Syncope, likely secondary to severe AS, patient Planned for TAVR at [facility]    Echo showed severe AS unable to assess SKIP.  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     Syncope, likely secondary to severe AS, patient Planned for TAVR at [facility]    Echo showed severe AS unable to assess SKIP.  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     Syncope, likely secondary to severe AS, patient Planned for TAVR at [facility]    Echo showed    1. Normal global left ventricular systolic function.   2. LV Ejection Fraction by Dennis's Method with a biplane EF of 71 %.   3. Severely enlarged left atrium.   4. Moderate asymmetric left ventricular hypertrophy involving the basal   septal wall, suggestive of hypertrophic cardiomyopathy.   5. There is LVOT obstruction of 25 mmHg at rest and 68mmHg with Valsalva.   6. Normal right ventricular size and function.   7. Severe aortic valve stenosis (Vmax 4.28 m/s, PG/MG 73/41 mmHg).   Unable SKIP due to LVOT obstruction.   8. Spectral Doppler shows pseudonormal pattern of left ventricular   myocardial filling (Grade II diastolic dysfunction).      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     Syncope, likely secondary to severe AS, patient Planned for TAVR at [facility]    Echo showed    1. Normal global left ventricular systolic function.   2. LV Ejection Fraction by Dennis's Method with a biplane EF of 71 %.   3. Severely enlarged left atrium.   4. Moderate asymmetric left ventricular hypertrophy involving the basal   septal wall, suggestive of hypertrophic cardiomyopathy.   5. There is LVOT obstruction of 25 mmHg at rest and 68mmHg with Valsalva.   6. Normal right ventricular size and function.   7. Severe aortic valve stenosis (Vmax 4.28 m/s, PG/MG 73/41 mmHg).   Unable SKIP due to LVOT obstruction.   8. Spectral Doppler shows pseudonormal pattern of left ventricular   myocardial filling (Grade II diastolic dysfunction).      patient started on losartan for BP control during admission   Patient clinically and HD stable

## 2023-12-19 NOTE — PROGRESS NOTE ADULT - TIME BILLING
as above This was a shared visit with the ОЛЕГ. I reviewed and verified the documentation and independently performed the documented:

## 2023-12-19 NOTE — ED ADULT NURSE REASSESSMENT NOTE - NS ED NURSE REASSESS COMMENT FT1
Spoke with [facility] transfer center @ [phone], regarding update on pt transfer. No bed available as of right now for pt.  transfer center states they will call RN when bed becomes available. Spoke with [facility] transfer center @ [phone], regarding update on pt transfer. No bed available as of right now for pt.  transfer center states they will call RN when bed becomes available.

## 2023-12-19 NOTE — PHYSICAL THERAPY INITIAL EVALUATION ADULT - GENERAL OBSERVATIONS, REHAB EVAL
1:45-2:15 pt encountered in bed in NAD, +tele.  Patient performed bed mobility, transfers, and ambulated with assistance, limited by blindness.

## 2023-12-19 NOTE — PATIENT PROFILE ADULT - NSPROEXTENSIONSOFSELF_GEN_A_NUR
Pathology slides received from [facility] [place] case # [id number]-. Slides and tissue blocks taken to pathology and given to Sanket             walking stick

## 2023-12-19 NOTE — PROGRESS NOTE ADULT - ASSESSMENT
84 yo F with hx of Severe AS, Legal blindness 2/2 glaucoma presents to ED for syncope.     # Syncope likely cardiac in nature   # Severe AS   - EKG: NSR. Non ischemic.   - 1st Troponin 19. Check 2nd Troponin.  - check orthostatic VS   - check CTH, TTE   - fall precaution.   - PT/Rehab.   - monitor on Telemetry.  - Cardiology consult with Dr. Luke   -  Cardio, recc transfer to [facility] as may need TAVR sooner  - transfer initiated, pending bed     DVT ppx: Lovenox SC  GI ppx: PPI  Diet: DASH diet  Activity: as tolerated.    #Progress Note Handoff  Pending (specify):  pending bed at [facility]  Family discussion: house staff updated pt family  Disposition: cardiac telemonitoring   Decision to admit the pt is based on acuity as above    82 yo F with hx of Severe AS, Legal blindness 2/2 glaucoma presents to ED for syncope.     # Syncope likely cardiac in nature   # Severe AS   - EKG: NSR. Non ischemic.   - 1st Troponin 19. Check 2nd Troponin.  - check orthostatic VS   - check CTH, TTE   - fall precaution.   - PT/Rehab.   - monitor on Telemetry.  - Cardiology consult with Dr. Luke   -  Cardio, recc transfer to [facility] as may need TAVR sooner  - transfer initiated, pending bed     DVT ppx: Lovenox SC  GI ppx: PPI  Diet: DASH diet  Activity: as tolerated.    #Progress Note Handoff  Pending (specify):  pending bed at [facility]  Family discussion: house staff updated pt family  Disposition: cardiac telemonitoring   Decision to admit the pt is based on acuity as above

## 2023-12-19 NOTE — PHYSICAL THERAPY INITIAL EVALUATION ADULT - PERTINENT HX OF CURRENT PROBLEM, REHAB EVAL
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 She does not remember what happened but her aide was with her. She was eating her breakfast this morning when she passed out in her chair for 1 to 2 minutes, woke up and had 1 episode of nonbilious nonbloody vomiting. No reported eye rolling / shaking / tonic-clonic movements. No urinary / fecal incontinence. As per daughter, patient had a syncopal episode about 3-4 weeks ago, woke up on the floor but didn't remember anything.     Patient was being prepped for TAVR at [facility] soon (she saw Dr. Smith and Dr. Crabtree at [facility]). Daughter at bedside reports coordinating with [facility] for possible transfer so that they can do the TAVR earlier. Currently patient denies any dizziness / sob / chest pain / fever / chills / GI /  symptoms. 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 She does not remember what happened but her aide was with her. She was eating her breakfast this morning when she passed out in her chair for 1 to 2 minutes, woke up and had 1 episode of nonbilious nonbloody vomiting. No reported eye rolling / shaking / tonic-clonic movements. No urinary / fecal incontinence. As per daughter, patient had a syncopal episode about 3-4 weeks ago, woke up on the floor but didn't remember anything.     Patient was being prepped for TAVR at [facility] soon (she saw Dr. Smith and Dr. Crabtree at [facility]). Daughter at bedside reports coordinating with [facility] for possible transfer so that they can do the TAVR earlier. Currently patient denies any dizziness / sob / chest pain / fever / chills / GI /  symptoms.

## 2023-12-19 NOTE — PHYSICAL THERAPY INITIAL EVALUATION ADULT - ADDITIONAL COMMENTS
Pt lives in a condo, has a HHA 5h/day, no RONIT.  She navigates around her home using handrails and her cane

## 2023-12-19 NOTE — PATIENT PROFILE ADULT - FALL HARM RISK - HARM RISK INTERVENTIONS
Assistance with ambulation/Assistance OOB with selected safe patient handling equipment/Communicate Risk of Fall with Harm to all staff/Reinforce activity limits and safety measures with patient and family/Tailored Fall Risk Interventions/Visual Cue: Yellow wristband and red socks/Bed in lowest position, wheels locked, appropriate side rails in place/Call bell, personal items and telephone in reach/Instruct patient to call for assistance before getting out of bed or chair/Non-slip footwear when patient is out of bed/[place] to call system/Physically safe environment - no spills, clutter or unnecessary equipment/Purposeful Proactive Rounding/Room/bathroom lighting operational, light cord in reach Assistance with ambulation/Assistance OOB with selected safe patient handling equipment/Communicate Risk of Fall with Harm to all staff/Reinforce activity limits and safety measures with patient and family/Tailored Fall Risk Interventions/Visual Cue: Yellow wristband and red socks/Bed in lowest position, wheels locked, appropriate side rails in place/Call bell, personal items and telephone in reach/Instruct patient to call for assistance before getting out of bed or chair/Non-slip footwear when patient is out of bed/[place] to call system/Physically safe environment - no spills, clutter or unnecessary equipment/Purposeful Proactive Rounding/Room/bathroom lighting operational, light cord in reach

## 2023-12-19 NOTE — ED ADULT NURSE NOTE - CHIEF COMPLAINT QUOTE
pt BIBA s/p syncopal episode at home. pt vomited after eating this morning then synopized for approx 5-10 seconds. pt never fell to ground. awake and alert in triage.

## 2023-12-19 NOTE — PATIENT PROFILE ADULT - FUNCTIONAL ASSESSMENT - BASIC MOBILITY 3.
Spoke with Haylee who states she has not had a chance to get ahold of Rosina yet. She will wait for her call back and then let this office know.   3 = A little assistance

## 2023-12-19 NOTE — PATIENT PROFILE ADULT - NSTOBACCONEVERSMOKERY/N_GEN_A
[FreeTextEntry1] : Extra beats that he used to get has subsided 95%. Since he retired, he has been able to get more sleep. Denies chest pain, shortness of breath or palpitations.
No

## 2023-12-20 PROCEDURE — 99232 SBSQ HOSP IP/OBS MODERATE 35: CPT

## 2023-12-20 PROCEDURE — 93306 TTE W/DOPPLER COMPLETE: CPT | Mod: 26

## 2023-12-20 RX ORDER — HALOPERIDOL DECANOATE 100 MG/ML
1 INJECTION INTRAMUSCULAR ONCE
Refills: 0 | Status: COMPLETED | OUTPATIENT
Start: 2023-12-20 | End: 2023-12-20

## 2023-12-20 RX ADMIN — LOSARTAN POTASSIUM 25 MILLIGRAM(S): 100 TABLET, FILM COATED ORAL at 05:27

## 2023-12-20 RX ADMIN — HALOPERIDOL DECANOATE 1 MILLIGRAM(S): 100 INJECTION INTRAMUSCULAR at 02:26

## 2023-12-20 NOTE — PROGRESS NOTE ADULT - NSPROGADDITIONALINFOA_GEN_ALL_CORE
#Progress Note Handoff:  Pending (specify):  Consults_________, Tests________, Test Results_______, Other___transfer to [facility]______  Family discussion:  Disposition: Home___/SNF___/Other__[facility]______/Unknown at this time________ #Progress Note Handoff:  Pending (specify):  Consults_________, Tests________, Test Results_______, Other___transfer to [facility]______  Family discussion:  Disposition: Home___/SNF___/Other__[facility]______/Unknown at this time________

## 2023-12-20 NOTE — CONSULT NOTE ADULT - ASSESSMENT
84 yo female with severe as, glaucoma, blind, htn, hyperlipidemia admitted with an episode of syncope.  this is the second episode and had one earlier a few weeks ago.  pt with no trauma.   pt had a tavr w/u at [facility] as per family.    I spoke with DR. Hughes who will accept the pt for transfer for a BAL and to have a tavr in the future.      cont current meds  keep on tele 82 yo female with severe as, glaucoma, blind, htn, hyperlipidemia admitted with an episode of syncope.  this is the second episode and had one earlier a few weeks ago.  pt with no trauma.   pt had a tavr w/u at [facility] as per family.    I spoke with DR. Hughes who will accept the pt for transfer for a BAL and to have a tavr in the future.      cont current meds  keep on tele

## 2023-12-20 NOTE — CONSULT NOTE ADULT - SUBJECTIVE AND OBJECTIVE BOX
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 She does not remember what happened but her aide was with her. She was eating her breakfast this morning when she passed out in her chair for 1 to 2 minutes, woke up and had 1 episode of nonbilious nonbloody vomiting. No reported eye rolling / shaking / tonic-clonic movements. No urinary / fecal incontinence. As per daughter, patient had a syncopal episode about 3-4 weeks ago, woke up on the floor but didn't remember anything.   pt has some confusion last night and given haldol.  pt is appropriate this am.      Patient was being prepped for TAVR at [facility] soon (she saw Dr. Smith and Dr. Crabtree at [facility]). Daughter at bedside reports coordinating with [facility] for possible transfer so that they can do the TAVR earlier. Currently patient denies any dizziness / sob / chest pain / fever / chills / GI /  symptoms.     In ED: BP : 110/70, HR: 74, RR: 16, temp: 98.2 , spo2: 98% RA  Labs within normal limits. CXR clear  Admitted to tele  (18 Dec 2023 21:52)      PAST MEDICAL & SURGICAL HISTORY:      PREVIOUS DIAGNOSTIC TESTING:      ECHO  FINDINGS: 1. Normal global left ventricular systolic function.   2. LV Ejection Fraction by Dennis's Method with a biplane EF of 71 %.   3. Severely enlarged left atrium.   4. Moderate asymmetric left ventricular hypertrophy involving the basal   septal wall, suggestive of hypertrophic cardiomyopathy.   5. There is LVOT obstruction of 25 mmHg at rest and 68mmHg with Valsalva.   6. Normal right ventricular size and function.   7. Severe aortic valve stenosis (Vmax 4.28 m/s, PG/MG 73/41 mmHg).   Unable SKIP due to LVOT obstruction.   8. Spectral Doppler shows pseudonormal pattern of left ventricular   myocardial filling (Grade II diastolic dysfunction).   9. Elevated mean left atrial pressure.  10. Adequate TR velocity was not obtained to accurately assess RVSP.      STRESS TEST  FINDINGS:    CATHETERIZATION  FINDINGS:    MEDICATIONS  (STANDING):  atorvastatin 40 milliGRAM(s) Oral at bedtime  enoxaparin Injectable 40 milliGRAM(s) SubCutaneous every 24 hours  influenza  Vaccine (HIGH DOSE) 0.7 milliLiter(s) IntraMuscular once  losartan 25 milliGRAM(s) Oral daily    MEDICATIONS  (PRN):      FAMILY HISTORY:Father  74 cvs, brother  49 cad, brothr  73 cva      SOCIAL HISTORY:  CIGARETTES:former  ALCOHOL:none  DRUGS:none                      REVIEW OF SYSTEMS:  CONSTITUTIONAL: No distress, Looks stable, blind  NECK: No pain or stiffnes  RESPIRATORY: No cough, wheezing, shortness of breath  CARDIOVASCULAR: No chest pain, SOB, palpitations, leg swelling  GASTROINTESTINAL: No abdominal or epigastric pain. No nausea, vomiting, or hematemesis;  No melena.  NEUROLOGICAL: No dizziness, headaches,(+) memory loss,  no loss of strength  SKIN: No itching, burning, rashes, or lesions   ENDOCRINE: No heat or cold intolerance  MUSCULOSKELETAL: No joint pain, No  swelling; No muscle pain  PSYCHIATRIC: No depression, anxiety, mood swings, or difficulty sleeping  ALLERGY: No hives, itching, rash          Vital Signs Last 24 Hrs  T(C): 36.7 (20 Dec 2023 16:06), Max: 36.9 (20 Dec 2023 00:19)  T(F): 98 (20 Dec 2023 16:06), Max: 98.4 (20 Dec 2023 00:19)  HR: 85 (20 Dec 2023 16:06) (73 - 87)  BP: 166/86 (20 Dec 2023 16:06) (140/76 - 169/89)  BP(mean): 119 (20 Dec 2023 16:06) (119 - 119)  RR: 20 (20 Dec 2023 16:06) (20 - 20)  SpO2: 98% (20 Dec 2023 16:06) (97% - 98%)    Parameters below as of 20 Dec 2023 16:06  Patient On (Oxygen Delivery Method): room air                          PHYSICAL EXAM:  GENERAL: No distress, well developed  HEAD:  Atraumatic, Normocephalic  NECK: Supple, No JVD, No Bruit of either carotid arteries  NERVOUS SYSTEM:  Alert, Awake, Oriented to time, place, person; Normal memory and speech; Normal motor Strength 5/5 B/L upper and lower extremities  CHEST/LUNG: Normal air entry to lung base bilaterally; No wheeze, crackle, rales, rhonchi  HEART: Regular heart beat, S1, A2, P2, No S3, No S4, No gallop,2/6 late peak sm rusb  ABDOMEN: Soft, Non tender, Non distended; Bowel sounds present  EXTREMITIES:  2+ Peripheral Pulses, No clubbing, No edema  SKIN: No rashes or lesions    TELEMETRY:nsr    ECG:Diagnosis Line Normal sinus rhythm  Normal ECG      I&O's Detail      LABS:                        15.0   8.75  )-----------( 138      ( 19 Dec 2023 06:24 )             46.7         144  |  107  |  13  ----------------------------<  91  4.5   |  27  |  0.6<L>    Ca    9.9      19 Dec 2023 06:24  Mg     2.3     -    TPro  7.6  /  Alb  4.5  /  TBili  1.1  /  DBili  x   /  AST  18  /  ALT  10  /  AlkPhos  87  12-        PT/INR - ( 19 Dec 2023 06:24 )   PT: 11.50 sec;   INR: 1.01 ratio         PTT - ( 19 Dec 2023 06:24 )  PTT:35.5 sec  Urinalysis Basic - ( 19 Dec 2023 06:24 )    Color: x / Appearance: x / SG: x / pH: x  Gluc: 91 mg/dL / Ketone: x  / Bili: x / Urobili: x   Blood: x / Protein: x / Nitrite: x   Leuk Esterase: x / RBC: x / WBC x   Sq Epi: x / Non Sq Epi: x / Bacteria: x      I&O's Summary      RADIOLOGY & ADDITIONAL STUDIES: Patient is a 83y old  Female who presents with a chief complaint of syncope (20 Dec 2023 11:44)      HPI:  83 year old known to have severe AS, 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 She does not remember what happened but her aide was with her. She was eating her breakfast this morning when she passed out in her chair for 1 to 2 minutes, woke up and had 1 episode of nonbilious nonbloody vomiting. No reported eye rolling / shaking / tonic-clonic movements. No urinary / fecal incontinence. As per daughter, patient had a syncopal episode about 3-4 weeks ago, woke up on the floor but didn't remember anything.   pt has some confusion last night and given haldol.  pt is appropriate this am.      Patient was being prepped for TAVR at [facility] soon (she saw Dr. Smith and Dr. Crabtree at [facility]). Daughter at bedside reports coordinating with [facility] for possible transfer so that they can do the TAVR earlier. Currently patient denies any dizziness / sob / chest pain / fever / chills / GI /  symptoms.     In ED: BP : 110/70, HR: 74, RR: 16, temp: 98.2 , spo2: 98% RA  Labs within normal limits. CXR clear  Admitted to tele  (18 Dec 2023 21:52)      PAST MEDICAL & SURGICAL HISTORY:      PREVIOUS DIAGNOSTIC TESTING:      ECHO  FINDINGS: 1. Normal global left ventricular systolic function.   2. LV Ejection Fraction by Dennis's Method with a biplane EF of 71 %.   3. Severely enlarged left atrium.   4. Moderate asymmetric left ventricular hypertrophy involving the basal   septal wall, suggestive of hypertrophic cardiomyopathy.   5. There is LVOT obstruction of 25 mmHg at rest and 68mmHg with Valsalva.   6. Normal right ventricular size and function.   7. Severe aortic valve stenosis (Vmax 4.28 m/s, PG/MG 73/41 mmHg).   Unable SKIP due to LVOT obstruction.   8. Spectral Doppler shows pseudonormal pattern of left ventricular   myocardial filling (Grade II diastolic dysfunction).   9. Elevated mean left atrial pressure.  10. Adequate TR velocity was not obtained to accurately assess RVSP.      STRESS TEST  FINDINGS:    CATHETERIZATION  FINDINGS:    MEDICATIONS  (STANDING):  atorvastatin 40 milliGRAM(s) Oral at bedtime  enoxaparin Injectable 40 milliGRAM(s) SubCutaneous every 24 hours  influenza  Vaccine (HIGH DOSE) 0.7 milliLiter(s) IntraMuscular once  losartan 25 milliGRAM(s) Oral daily    MEDICATIONS  (PRN):      FAMILY HISTORY:Father  74 cvs, brother  49 cad, brothr  73 cva      SOCIAL HISTORY:  CIGARETTES:former  ALCOHOL:none  DRUGS:none                      REVIEW OF SYSTEMS:  CONSTITUTIONAL: No distress, Looks stable, blind  NECK: No pain or stiffnes  RESPIRATORY: No cough, wheezing, shortness of breath  CARDIOVASCULAR: No chest pain, SOB, palpitations, leg swelling  GASTROINTESTINAL: No abdominal or epigastric pain. No nausea, vomiting, or hematemesis;  No melena.  NEUROLOGICAL: No dizziness, headaches,(+) memory loss,  no loss of strength  SKIN: No itching, burning, rashes, or lesions   ENDOCRINE: No heat or cold intolerance  MUSCULOSKELETAL: No joint pain, No  swelling; No muscle pain  PSYCHIATRIC: No depression, anxiety, mood swings, or difficulty sleeping  ALLERGY: No hives, itching, rash          Vital Signs Last 24 Hrs  T(C): 36.7 (20 Dec 2023 16:06), Max: 36.9 (20 Dec 2023 00:19)  T(F): 98 (20 Dec 2023 16:06), Max: 98.4 (20 Dec 2023 00:19)  HR: 85 (20 Dec 2023 16:06) (73 - 87)  BP: 166/86 (20 Dec 2023 16:06) (140/76 - 169/89)  BP(mean): 119 (20 Dec 2023 16:06) (119 - 119)  RR: 20 (20 Dec 2023 16:06) (20 - 20)  SpO2: 98% (20 Dec 2023 16:06) (97% - 98%)    Parameters below as of 20 Dec 2023 16:06  Patient On (Oxygen Delivery Method): room air                          PHYSICAL EXAM:  GENERAL: No distress, well developed  HEAD:  Atraumatic, Normocephalic  NECK: Supple, No JVD, No Bruit of either carotid arteries  NERVOUS SYSTEM:  Alert, Awake, Oriented to time, place, person; Normal memory and speech; Normal motor Strength 5/5 B/L upper and lower extremities  CHEST/LUNG: Normal air entry to lung base bilaterally; No wheeze, crackle, rales, rhonchi  HEART: Regular heart beat, S1, A2, P2, No S3, No S4, No gallop,2/6 late peak sm rusb  ABDOMEN: Soft, Non tender, Non distended; Bowel sounds present  EXTREMITIES:  2+ Peripheral Pulses, No clubbing, No edema  SKIN: No rashes or lesions    TELEMETRY:nsr    ECG:Diagnosis Line Normal sinus rhythm  Normal ECG      I&O's Detail      LABS:                        15.0   8.75  )-----------( 138      ( 19 Dec 2023 06:24 )             46.7         144  |  107  |  13  ----------------------------<  91  4.5   |  27  |  0.6<L>    Ca    9.9      19 Dec 2023 06:24  Mg     2.3     -    TPro  7.6  /  Alb  4.5  /  TBili  1.1  /  DBili  x   /  AST  18  /  ALT  10  /  AlkPhos  87  12-        PT/INR - ( 19 Dec 2023 06:24 )   PT: 11.50 sec;   INR: 1.01 ratio         PTT - ( 19 Dec 2023 06:24 )  PTT:35.5 sec  Urinalysis Basic - ( 19 Dec 2023 06:24 )    Color: x / Appearance: x / SG: x / pH: x  Gluc: 91 mg/dL / Ketone: x  / Bili: x / Urobili: x   Blood: x / Protein: x / Nitrite: x   Leuk Esterase: x / RBC: x / WBC x   Sq Epi: x / Non Sq Epi: x / Bacteria: x      I&O's Summary      RADIOLOGY & ADDITIONAL STUDIES:

## 2023-12-20 NOTE — PROGRESS NOTE ADULT - ASSESSMENT
82 yo F with hx of Severe AS, Legal blindness 2/2 glaucoma presents to ED for syncope.     # Syncope  # Severe AS   - EKG noted  - orthostatic VS   - cth neg  - f/u tte   - PT/Rehab.   - monitor on Telemetry.  - Cardiology consult with Dr. Luke   - pending transfer to [facility]    #HTN  -losartan 25    DVT ppx: Lovenox SC       82 yo F with hx of Severe AS, Legal blindness 2/2 glaucoma presents to ED for syncope.     # Syncope  # Severe AS   - EKG noted  - orthostatic VS   - cth neg  - f/u tte   - PT/Rehab.   - monitor on Telemetry.  - Cardiology consult with Dr. Luke   - pending transfer to [facility]    #HTN  -losartan 25    DVT ppx: Lovenox SC

## 2023-12-21 VITALS
SYSTOLIC BLOOD PRESSURE: 178 MMHG | OXYGEN SATURATION: 97 % | RESPIRATION RATE: 19 BRPM | DIASTOLIC BLOOD PRESSURE: 99 MMHG | HEART RATE: 85 BPM

## 2023-12-21 LAB
ALBUMIN SERPL ELPH-MCNC: 4.3 G/DL — SIGNIFICANT CHANGE UP (ref 3.5–5.2)
ALBUMIN SERPL ELPH-MCNC: 4.3 G/DL — SIGNIFICANT CHANGE UP (ref 3.5–5.2)
ALP SERPL-CCNC: 87 U/L — SIGNIFICANT CHANGE UP (ref 30–115)
ALP SERPL-CCNC: 87 U/L — SIGNIFICANT CHANGE UP (ref 30–115)
ALT FLD-CCNC: 10 U/L — SIGNIFICANT CHANGE UP (ref 0–41)
ALT FLD-CCNC: 10 U/L — SIGNIFICANT CHANGE UP (ref 0–41)
ANION GAP SERPL CALC-SCNC: 9 MMOL/L — SIGNIFICANT CHANGE UP (ref 7–14)
ANION GAP SERPL CALC-SCNC: 9 MMOL/L — SIGNIFICANT CHANGE UP (ref 7–14)
AST SERPL-CCNC: 22 U/L — SIGNIFICANT CHANGE UP (ref 0–41)
AST SERPL-CCNC: 22 U/L — SIGNIFICANT CHANGE UP (ref 0–41)
BASOPHILS # BLD AUTO: 0.05 K/UL — SIGNIFICANT CHANGE UP (ref 0–0.2)
BASOPHILS # BLD AUTO: 0.05 K/UL — SIGNIFICANT CHANGE UP (ref 0–0.2)
BASOPHILS NFR BLD AUTO: 0.6 % — SIGNIFICANT CHANGE UP (ref 0–1)
BASOPHILS NFR BLD AUTO: 0.6 % — SIGNIFICANT CHANGE UP (ref 0–1)
BILIRUB SERPL-MCNC: 1 MG/DL — SIGNIFICANT CHANGE UP (ref 0.2–1.2)
BILIRUB SERPL-MCNC: 1 MG/DL — SIGNIFICANT CHANGE UP (ref 0.2–1.2)
BUN SERPL-MCNC: 10 MG/DL — SIGNIFICANT CHANGE UP (ref 10–20)
BUN SERPL-MCNC: 10 MG/DL — SIGNIFICANT CHANGE UP (ref 10–20)
CALCIUM SERPL-MCNC: 9.8 MG/DL — SIGNIFICANT CHANGE UP (ref 8.4–10.5)
CALCIUM SERPL-MCNC: 9.8 MG/DL — SIGNIFICANT CHANGE UP (ref 8.4–10.5)
CHLORIDE SERPL-SCNC: 107 MMOL/L — SIGNIFICANT CHANGE UP (ref 98–110)
CHLORIDE SERPL-SCNC: 107 MMOL/L — SIGNIFICANT CHANGE UP (ref 98–110)
CO2 SERPL-SCNC: 27 MMOL/L — SIGNIFICANT CHANGE UP (ref 17–32)
CO2 SERPL-SCNC: 27 MMOL/L — SIGNIFICANT CHANGE UP (ref 17–32)
CREAT SERPL-MCNC: 0.6 MG/DL — LOW (ref 0.7–1.5)
CREAT SERPL-MCNC: 0.6 MG/DL — LOW (ref 0.7–1.5)
EGFR: 88 ML/MIN/1.73M2 — SIGNIFICANT CHANGE UP
EGFR: 88 ML/MIN/1.73M2 — SIGNIFICANT CHANGE UP
EOSINOPHIL # BLD AUTO: 0.04 K/UL — SIGNIFICANT CHANGE UP (ref 0–0.7)
EOSINOPHIL # BLD AUTO: 0.04 K/UL — SIGNIFICANT CHANGE UP (ref 0–0.7)
EOSINOPHIL NFR BLD AUTO: 0.5 % — SIGNIFICANT CHANGE UP (ref 0–8)
EOSINOPHIL NFR BLD AUTO: 0.5 % — SIGNIFICANT CHANGE UP (ref 0–8)
GLUCOSE SERPL-MCNC: 96 MG/DL — SIGNIFICANT CHANGE UP (ref 70–99)
GLUCOSE SERPL-MCNC: 96 MG/DL — SIGNIFICANT CHANGE UP (ref 70–99)
HCT VFR BLD CALC: 42.4 % — SIGNIFICANT CHANGE UP (ref 37–47)
HCT VFR BLD CALC: 42.4 % — SIGNIFICANT CHANGE UP (ref 37–47)
HGB BLD-MCNC: 14 G/DL — SIGNIFICANT CHANGE UP (ref 12–16)
HGB BLD-MCNC: 14 G/DL — SIGNIFICANT CHANGE UP (ref 12–16)
IMM GRANULOCYTES NFR BLD AUTO: 0.2 % — SIGNIFICANT CHANGE UP (ref 0.1–0.3)
IMM GRANULOCYTES NFR BLD AUTO: 0.2 % — SIGNIFICANT CHANGE UP (ref 0.1–0.3)
LYMPHOCYTES # BLD AUTO: 1.86 K/UL — SIGNIFICANT CHANGE UP (ref 1.2–3.4)
LYMPHOCYTES # BLD AUTO: 1.86 K/UL — SIGNIFICANT CHANGE UP (ref 1.2–3.4)
LYMPHOCYTES # BLD AUTO: 22.1 % — SIGNIFICANT CHANGE UP (ref 20.5–51.1)
LYMPHOCYTES # BLD AUTO: 22.1 % — SIGNIFICANT CHANGE UP (ref 20.5–51.1)
MCHC RBC-ENTMCNC: 30.4 PG — SIGNIFICANT CHANGE UP (ref 27–31)
MCHC RBC-ENTMCNC: 30.4 PG — SIGNIFICANT CHANGE UP (ref 27–31)
MCHC RBC-ENTMCNC: 33 G/DL — SIGNIFICANT CHANGE UP (ref 32–37)
MCHC RBC-ENTMCNC: 33 G/DL — SIGNIFICANT CHANGE UP (ref 32–37)
MCV RBC AUTO: 92 FL — SIGNIFICANT CHANGE UP (ref 81–99)
MCV RBC AUTO: 92 FL — SIGNIFICANT CHANGE UP (ref 81–99)
MONOCYTES # BLD AUTO: 0.95 K/UL — HIGH (ref 0.1–0.6)
MONOCYTES # BLD AUTO: 0.95 K/UL — HIGH (ref 0.1–0.6)
MONOCYTES NFR BLD AUTO: 11.3 % — HIGH (ref 1.7–9.3)
MONOCYTES NFR BLD AUTO: 11.3 % — HIGH (ref 1.7–9.3)
NEUTROPHILS # BLD AUTO: 5.51 K/UL — SIGNIFICANT CHANGE UP (ref 1.4–6.5)
NEUTROPHILS # BLD AUTO: 5.51 K/UL — SIGNIFICANT CHANGE UP (ref 1.4–6.5)
NEUTROPHILS NFR BLD AUTO: 65.3 % — SIGNIFICANT CHANGE UP (ref 42.2–75.2)
NEUTROPHILS NFR BLD AUTO: 65.3 % — SIGNIFICANT CHANGE UP (ref 42.2–75.2)
NRBC # BLD: 0 /100 WBCS — SIGNIFICANT CHANGE UP (ref 0–0)
NRBC # BLD: 0 /100 WBCS — SIGNIFICANT CHANGE UP (ref 0–0)
PLATELET # BLD AUTO: 141 K/UL — SIGNIFICANT CHANGE UP (ref 130–400)
PLATELET # BLD AUTO: 141 K/UL — SIGNIFICANT CHANGE UP (ref 130–400)
PMV BLD: 13.4 FL — HIGH (ref 7.4–10.4)
PMV BLD: 13.4 FL — HIGH (ref 7.4–10.4)
POTASSIUM SERPL-MCNC: 3.6 MMOL/L — SIGNIFICANT CHANGE UP (ref 3.5–5)
POTASSIUM SERPL-MCNC: 3.6 MMOL/L — SIGNIFICANT CHANGE UP (ref 3.5–5)
POTASSIUM SERPL-SCNC: 3.6 MMOL/L — SIGNIFICANT CHANGE UP (ref 3.5–5)
POTASSIUM SERPL-SCNC: 3.6 MMOL/L — SIGNIFICANT CHANGE UP (ref 3.5–5)
PROT SERPL-MCNC: 6.6 G/DL — SIGNIFICANT CHANGE UP (ref 6–8)
PROT SERPL-MCNC: 6.6 G/DL — SIGNIFICANT CHANGE UP (ref 6–8)
RBC # BLD: 4.61 M/UL — SIGNIFICANT CHANGE UP (ref 4.2–5.4)
RBC # BLD: 4.61 M/UL — SIGNIFICANT CHANGE UP (ref 4.2–5.4)
RBC # FLD: 13.4 % — SIGNIFICANT CHANGE UP (ref 11.5–14.5)
RBC # FLD: 13.4 % — SIGNIFICANT CHANGE UP (ref 11.5–14.5)
SODIUM SERPL-SCNC: 143 MMOL/L — SIGNIFICANT CHANGE UP (ref 135–146)
SODIUM SERPL-SCNC: 143 MMOL/L — SIGNIFICANT CHANGE UP (ref 135–146)
WBC # BLD: 8.43 K/UL — SIGNIFICANT CHANGE UP (ref 4.8–10.8)
WBC # BLD: 8.43 K/UL — SIGNIFICANT CHANGE UP (ref 4.8–10.8)
WBC # FLD AUTO: 8.43 K/UL — SIGNIFICANT CHANGE UP (ref 4.8–10.8)
WBC # FLD AUTO: 8.43 K/UL — SIGNIFICANT CHANGE UP (ref 4.8–10.8)

## 2023-12-21 PROCEDURE — 99239 HOSP IP/OBS DSCHRG MGMT >30: CPT

## 2023-12-21 RX ADMIN — LOSARTAN POTASSIUM 25 MILLIGRAM(S): 100 TABLET, FILM COATED ORAL at 06:57

## 2023-12-21 NOTE — PROGRESS NOTE ADULT - ASSESSMENT
82 yo F with hx of Severe AS, Legal blindness 2/2 glaucoma presents to ED for syncope.     # Syncope  # Severe AS   - EKG noted  - orthostatic neg  - cth neg  - tte with severe AS, LVOT, HCM  - appreciate cards   - transfer to [facility] today for TAVR    #HTN  -losartan 25    DVT ppx: Lovenox SC       82 yo F with hx of Severe AS, Legal blindness 2/2 glaucoma presents to ED for syncope.     # Syncope  # Severe AS   - EKG noted  - orthostatic neg  - cth neg  - tte with severe AS, LVOT, HCM  - appreciate cards   - transfer to [facility] today for TAVR    #HTN  -losartan 25    DVT ppx: Lovenox SC

## 2023-12-21 NOTE — PROGRESS NOTE ADULT - SUBJECTIVE AND OBJECTIVE BOX
INTERVAL HPI/OVERNIGHT EVENTS:    SUBJECTIVE: Patient seen and examined at bedside.     no cp, sob, abd pain, fever  no cp, palpitations, armijo, orthopnea    OBJECTIVE:    VITAL SIGNS:  Vital Signs Last 24 Hrs  T(C): 36.2 (21 Dec 2023 08:42), Max: 36.8 (20 Dec 2023 23:55)  T(F): 97.1 (21 Dec 2023 08:42), Max: 98.2 (20 Dec 2023 23:55)  HR: 85 (21 Dec 2023 09:13) (75 - 85)  BP: 178/99 (21 Dec 2023 09:13) (149/76 - 178/99)  BP(mean): 119 (20 Dec 2023 16:06) (119 - 119)  RR: 19 (21 Dec 2023 09:13) (18 - 20)  SpO2: 97% (21 Dec 2023 09:13) (96% - 98%)    Parameters below as of 21 Dec 2023 09:13  Patient On (Oxygen Delivery Method): room air          PHYSICAL EXAM:    General: NAD  HEENT: NC/AT; PERRL, clear conjunctiva  Neck: supple  Respiratory: CTA b/l  Cardiovascular: +S1/S2; RRR  Abdomen: soft, NT/ND; +BS x4  Extremities: WWP, 2+ peripheral pulses b/l; no LE edema  Skin: normal color and turgor; no rash  Neurological:    MEDICATIONS:  MEDICATIONS  (STANDING):  atorvastatin 40 milliGRAM(s) Oral at bedtime  enoxaparin Injectable 40 milliGRAM(s) SubCutaneous every 24 hours  influenza  Vaccine (HIGH DOSE) 0.7 milliLiter(s) IntraMuscular once  losartan 25 milliGRAM(s) Oral daily    MEDICATIONS  (PRN):      ALLERGIES:  Allergies    Nickel based metals (Other)  No Known Drug Allergies  latex (Pruritus)    Intolerances        LABS:                        14.0   8.43  )-----------( 141      ( 21 Dec 2023 07:26 )             42.4     Hemoglobin: 14.0 g/dL (12-21 @ 07:26)  Hemoglobin: 15.0 g/dL (12-19 @ 06:24)  Hemoglobin: 14.8 g/dL (12-18 @ 12:30)    CBC Full  -  ( 21 Dec 2023 07:26 )  WBC Count : 8.43 K/uL  RBC Count : 4.61 M/uL  Hemoglobin : 14.0 g/dL  Hematocrit : 42.4 %  Platelet Count - Automated : 141 K/uL  Mean Cell Volume : 92.0 fL  Mean Cell Hemoglobin : 30.4 pg  Mean Cell Hemoglobin Concentration : 33.0 g/dL  Auto Neutrophil # : 5.51 K/uL  Auto Lymphocyte # : 1.86 K/uL  Auto Monocyte # : 0.95 K/uL  Auto Eosinophil # : 0.04 K/uL  Auto Basophil # : 0.05 K/uL  Auto Neutrophil % : 65.3 %  Auto Lymphocyte % : 22.1 %  Auto Monocyte % : 11.3 %  Auto Eosinophil % : 0.5 %  Auto Basophil % : 0.6 %    12-21    143  |  107  |  10  ----------------------------<  96  3.6   |  27  |  0.6<L>    Ca    9.8      21 Dec 2023 07:26    TPro  6.6  /  Alb  4.3  /  TBili  1.0  /  DBili  x   /  AST  22  /  ALT  10  /  AlkPhos  87  12-21    Creatinine Trend: 0.6<--, 0.6<--, 0.8<--  LIVER FUNCTIONS - ( 21 Dec 2023 07:26 )  Alb: 4.3 g/dL / Pro: 6.6 g/dL / ALK PHOS: 87 U/L / ALT: 10 U/L / AST: 22 U/L / GGT: x               hs Troponin:                19 <<== 12-19-23 @ 12:19            Urinalysis Basic - ( 21 Dec 2023 07:26 )    Color: x / Appearance: x / SG: x / pH: x  Gluc: 96 mg/dL / Ketone: x  / Bili: x / Urobili: x   Blood: x / Protein: x / Nitrite: x   Leuk Esterase: x / RBC: x / WBC x   Sq Epi: x / Non Sq Epi: x / Bacteria: x      CSF:                      EKG:   MICROBIOLOGY:    IMAGING:      Labs, imaging, EKG personally reviewed    RADIOLOGY & ADDITIONAL TESTS: Reviewed.
INTERVAL HPI/OVERNIGHT EVENTS:    SUBJECTIVE: Patient seen and examined at bedside.     no cp, sob, abd pain, fever; confused    OBJECTIVE:    VITAL SIGNS:  Vital Signs Last 24 Hrs  T(C): 36.7 (20 Dec 2023 08:10), Max: 36.9 (20 Dec 2023 00:19)  T(F): 98 (20 Dec 2023 08:10), Max: 98.4 (20 Dec 2023 00:19)  HR: 79 (20 Dec 2023 08:10) (60 - 87)  BP: 159/86 (20 Dec 2023 08:10) (140/76 - 169/89)  BP(mean): --  RR: 20 (20 Dec 2023 08:10) (20 - 20)  SpO2: 98% (20 Dec 2023 08:10) (97% - 98%)    Parameters below as of 20 Dec 2023 05:26  Patient On (Oxygen Delivery Method): room air          PHYSICAL EXAM:    General: NAD  HEENT: NC/AT; PERRL, clear conjunctiva  Neck: supple  Respiratory: CTA b/l  Cardiovascular: +S1/S2; RRR  Abdomen: soft, NT/ND; +BS x4  Extremities: WWP, 2+ peripheral pulses b/l; no LE edema  Skin: normal color and turgor; no rash  Neurological:    MEDICATIONS:  MEDICATIONS  (STANDING):  atorvastatin 40 milliGRAM(s) Oral at bedtime  enoxaparin Injectable 40 milliGRAM(s) SubCutaneous every 24 hours  influenza  Vaccine (HIGH DOSE) 0.7 milliLiter(s) IntraMuscular once  losartan 25 milliGRAM(s) Oral daily    MEDICATIONS  (PRN):      ALLERGIES:  Allergies    Nickel based metals (Other)  No Known Drug Allergies  latex (Pruritus)    Intolerances        LABS:                        15.0   8.75  )-----------( 138      ( 19 Dec 2023 06:24 )             46.7     Hemoglobin: 15.0 g/dL (12-19 @ 06:24)  Hemoglobin: 14.8 g/dL (12-18 @ 12:30)    CBC Full  -  ( 19 Dec 2023 06:24 )  WBC Count : 8.75 K/uL  RBC Count : 4.93 M/uL  Hemoglobin : 15.0 g/dL  Hematocrit : 46.7 %  Platelet Count - Automated : 138 K/uL  Mean Cell Volume : 94.7 fL  Mean Cell Hemoglobin : 30.4 pg  Mean Cell Hemoglobin Concentration : 32.1 g/dL  Auto Neutrophil # : 5.49 K/uL  Auto Lymphocyte # : 2.21 K/uL  Auto Monocyte # : 0.94 K/uL  Auto Eosinophil # : 0.04 K/uL  Auto Basophil # : 0.04 K/uL  Auto Neutrophil % : 62.7 %  Auto Lymphocyte % : 25.3 %  Auto Monocyte % : 10.7 %  Auto Eosinophil % : 0.5 %  Auto Basophil % : 0.5 %    12-19    144  |  107  |  13  ----------------------------<  91  4.5   |  27  |  0.6<L>    Ca    9.9      19 Dec 2023 06:24  Mg     2.3     12-19    TPro  7.6  /  Alb  4.5  /  TBili  1.1  /  DBili  x   /  AST  18  /  ALT  10  /  AlkPhos  87  12-19    Creatinine Trend: 0.6<--, 0.8<--  LIVER FUNCTIONS - ( 19 Dec 2023 06:24 )  Alb: 4.5 g/dL / Pro: 7.6 g/dL / ALK PHOS: 87 U/L / ALT: 10 U/L / AST: 18 U/L / GGT: x           PT/INR - ( 19 Dec 2023 06:24 )   PT: 11.50 sec;   INR: 1.01 ratio         PTT - ( 19 Dec 2023 06:24 )  PTT:35.5 sec    hs Troponin:                19 <<== 12-19-23 @ 12:19                22 <<== 12-19-23 @ 06:24                19 <<== 12-18-23 @ 12:30            Urinalysis Basic - ( 19 Dec 2023 06:24 )    Color: x / Appearance: x / SG: x / pH: x  Gluc: 91 mg/dL / Ketone: x  / Bili: x / Urobili: x   Blood: x / Protein: x / Nitrite: x   Leuk Esterase: x / RBC: x / WBC x   Sq Epi: x / Non Sq Epi: x / Bacteria: x      CSF:                      EKG:   MICROBIOLOGY:    IMAGING:      Labs, imaging, EKG personally reviewed    RADIOLOGY & ADDITIONAL TESTS: Reviewed.
Patient is a 83y old  Female who presents with a chief complaint of syncope (12-19-23)      Pt seen and examined at bedside. No CP or SOB.          PAST MEDICAL & SURGICAL HISTORY:      VITAL SIGNS (Last 24 hrs):  T(C): 36.2 (12-19-23 @ 08:10), Max: 36.7 (12-18-23 @ 16:11)  HR: 58 (12-19-23 @ 08:10) (58 - 79)  BP: 170/89 (12-19-23 @ 10:11) (162/77 - 181/92)  RR: 18 (12-19-23 @ 08:10) (16 - 18)  SpO2: 98% (12-19-23 @ 08:10) (97% - 99%)  Wt(kg): --  Daily     Daily     I&O's Summary    18 Dec 2023 07:01  -  19 Dec 2023 07:00  --------------------------------------------------------  IN: 0 mL / OUT: 350 mL / NET: -350 mL        PHYSICAL EXAM:  GENERAL: NAD, elderly   HEAD:  Atraumatic, Normocephalic  EYES: EOMI, PERRLA, conjunctiva and sclera clear  NECK: Supple, No JVD  CHEST/LUNG: Clear to auscultation bilaterally; No wheeze  HEART: Regular rate and rhythm; No  rubs, or gallops, +murmur  ABDOMEN: Soft, Nontender, Nondistended; Bowel sounds present  EXTREMITIES:  2+ Peripheral Pulses, No clubbing, cyanosis, or edema  PSYCH: AAOx3  NEUROLOGY: non-focal  SKIN: No rashes or lesions    Labs Reviewed  Spoke to patient in regards to abnormal labs.    CBC Full  -  ( 19 Dec 2023 06:24 )  WBC Count : 8.75 K/uL  Hemoglobin : 15.0 g/dL  Hematocrit : 46.7 %  Platelet Count - Automated : 138 K/uL  Mean Cell Volume : 94.7 fL  Mean Cell Hemoglobin : 30.4 pg  Mean Cell Hemoglobin Concentration : 32.1 g/dL  Auto Neutrophil # : 5.49 K/uL  Auto Lymphocyte # : 2.21 K/uL  Auto Monocyte # : 0.94 K/uL  Auto Eosinophil # : 0.04 K/uL  Auto Basophil # : 0.04 K/uL  Auto Neutrophil % : 62.7 %  Auto Lymphocyte % : 25.3 %  Auto Monocyte % : 10.7 %  Auto Eosinophil % : 0.5 %  Auto Basophil % : 0.5 %    BMP:    12-19 @ 06:24    Blood Urea Nitrogen - 13  Calcium - 9.9  Carbond Dioxide - 27  Chloride - 107  Creatinine - 0.6  Glucose - 91  Potassium - 4.5  Sodium - 144      Hemoglobin A1c -   PT/INR - ( 19 Dec 2023 06:24 )   PT: 11.50 sec;   INR: 1.01 ratio         PTT - ( 19 Dec 2023 06:24 )  PTT:35.5 sec  Urine Culture:        COVID Labs  CRP:      D-Dimer:            Imaging reviewed independently and reviewed read      < from: CT Head No Cont (12.18.23 @ 23:47) >    IMPRESSION:    No acute intracranial pathology.    Moderate-severe chronic microvascular ischemic changes.    < end of copied text >    MEDICATIONS  (STANDING):  atorvastatin 40 milliGRAM(s) Oral at bedtime  enoxaparin Injectable 40 milliGRAM(s) SubCutaneous every 24 hours  influenza  Vaccine (HIGH DOSE) 0.7 milliLiter(s) IntraMuscular once  losartan 25 milliGRAM(s) Oral daily    MEDICATIONS  (PRN):

## 2023-12-23 ENCOUNTER — EMERGENCY (EMERGENCY)
Facility: HOSPITAL | Age: 83
LOS: 0 days | Discharge: HOME | End: 2023-12-23
Attending: STUDENT IN AN ORGANIZED HEALTH CARE EDUCATION/TRAINING PROGRAM
Payer: MEDICARE

## 2023-12-23 VITALS
RESPIRATION RATE: 14 BRPM | DIASTOLIC BLOOD PRESSURE: 72 MMHG | WEIGHT: 136.91 LBS | HEART RATE: 71 BPM | OXYGEN SATURATION: 98 % | TEMPERATURE: 98 F | SYSTOLIC BLOOD PRESSURE: 109 MMHG

## 2023-12-23 DIAGNOSIS — I10 ESSENTIAL (PRIMARY) HYPERTENSION: ICD-10-CM

## 2023-12-23 DIAGNOSIS — I35.0 NONRHEUMATIC AORTIC (VALVE) STENOSIS: ICD-10-CM

## 2023-12-23 DIAGNOSIS — H54.7 UNSPECIFIED VISUAL LOSS: ICD-10-CM

## 2023-12-23 DIAGNOSIS — E78.5 HYPERLIPIDEMIA, UNSPECIFIED: ICD-10-CM

## 2023-12-23 DIAGNOSIS — R79.89 OTHER SPECIFIED ABNORMAL FINDINGS OF BLOOD CHEMISTRY: ICD-10-CM

## 2023-12-23 DIAGNOSIS — R55 SYNCOPE AND COLLAPSE: ICD-10-CM

## 2023-12-23 DIAGNOSIS — R41.82 ALTERED MENTAL STATUS, UNSPECIFIED: ICD-10-CM

## 2023-12-23 LAB
ALBUMIN SERPL ELPH-MCNC: 4.3 G/DL — SIGNIFICANT CHANGE UP (ref 3.5–5.2)
ALBUMIN SERPL ELPH-MCNC: 4.3 G/DL — SIGNIFICANT CHANGE UP (ref 3.5–5.2)
ALP SERPL-CCNC: 74 U/L — SIGNIFICANT CHANGE UP (ref 30–115)
ALP SERPL-CCNC: 74 U/L — SIGNIFICANT CHANGE UP (ref 30–115)
ALT FLD-CCNC: 11 U/L — SIGNIFICANT CHANGE UP (ref 0–41)
ALT FLD-CCNC: 11 U/L — SIGNIFICANT CHANGE UP (ref 0–41)
ANION GAP SERPL CALC-SCNC: 12 MMOL/L — SIGNIFICANT CHANGE UP (ref 7–14)
ANION GAP SERPL CALC-SCNC: 12 MMOL/L — SIGNIFICANT CHANGE UP (ref 7–14)
AST SERPL-CCNC: 33 U/L — SIGNIFICANT CHANGE UP (ref 0–41)
AST SERPL-CCNC: 33 U/L — SIGNIFICANT CHANGE UP (ref 0–41)
BASE EXCESS BLDV CALC-SCNC: -0.6 MMOL/L — SIGNIFICANT CHANGE UP (ref -2–3)
BASE EXCESS BLDV CALC-SCNC: -0.6 MMOL/L — SIGNIFICANT CHANGE UP (ref -2–3)
BASOPHILS # BLD AUTO: 0.05 K/UL — SIGNIFICANT CHANGE UP (ref 0–0.2)
BASOPHILS # BLD AUTO: 0.05 K/UL — SIGNIFICANT CHANGE UP (ref 0–0.2)
BASOPHILS NFR BLD AUTO: 0.5 % — SIGNIFICANT CHANGE UP (ref 0–1)
BASOPHILS NFR BLD AUTO: 0.5 % — SIGNIFICANT CHANGE UP (ref 0–1)
BILIRUB SERPL-MCNC: 1.3 MG/DL — HIGH (ref 0.2–1.2)
BILIRUB SERPL-MCNC: 1.3 MG/DL — HIGH (ref 0.2–1.2)
BUN SERPL-MCNC: 22 MG/DL — HIGH (ref 10–20)
BUN SERPL-MCNC: 22 MG/DL — HIGH (ref 10–20)
CA-I SERPL-SCNC: 1.19 MMOL/L — SIGNIFICANT CHANGE UP (ref 1.15–1.33)
CA-I SERPL-SCNC: 1.19 MMOL/L — SIGNIFICANT CHANGE UP (ref 1.15–1.33)
CALCIUM SERPL-MCNC: 9.6 MG/DL — SIGNIFICANT CHANGE UP (ref 8.4–10.4)
CALCIUM SERPL-MCNC: 9.6 MG/DL — SIGNIFICANT CHANGE UP (ref 8.4–10.4)
CHLORIDE SERPL-SCNC: 105 MMOL/L — SIGNIFICANT CHANGE UP (ref 98–110)
CHLORIDE SERPL-SCNC: 105 MMOL/L — SIGNIFICANT CHANGE UP (ref 98–110)
CO2 SERPL-SCNC: 22 MMOL/L — SIGNIFICANT CHANGE UP (ref 17–32)
CO2 SERPL-SCNC: 22 MMOL/L — SIGNIFICANT CHANGE UP (ref 17–32)
CREAT SERPL-MCNC: 0.8 MG/DL — SIGNIFICANT CHANGE UP (ref 0.7–1.5)
CREAT SERPL-MCNC: 0.8 MG/DL — SIGNIFICANT CHANGE UP (ref 0.7–1.5)
EGFR: 72 ML/MIN/1.73M2 — SIGNIFICANT CHANGE UP
EGFR: 72 ML/MIN/1.73M2 — SIGNIFICANT CHANGE UP
EOSINOPHIL # BLD AUTO: 0.05 K/UL — SIGNIFICANT CHANGE UP (ref 0–0.7)
EOSINOPHIL # BLD AUTO: 0.05 K/UL — SIGNIFICANT CHANGE UP (ref 0–0.7)
EOSINOPHIL NFR BLD AUTO: 0.5 % — SIGNIFICANT CHANGE UP (ref 0–8)
EOSINOPHIL NFR BLD AUTO: 0.5 % — SIGNIFICANT CHANGE UP (ref 0–8)
GAS PNL BLDV: 133 MMOL/L — LOW (ref 136–145)
GAS PNL BLDV: 133 MMOL/L — LOW (ref 136–145)
GAS PNL BLDV: SIGNIFICANT CHANGE UP
GAS PNL BLDV: SIGNIFICANT CHANGE UP
GLUCOSE SERPL-MCNC: 140 MG/DL — HIGH (ref 70–99)
GLUCOSE SERPL-MCNC: 140 MG/DL — HIGH (ref 70–99)
HCO3 BLDV-SCNC: 25 MMOL/L — SIGNIFICANT CHANGE UP (ref 22–29)
HCO3 BLDV-SCNC: 25 MMOL/L — SIGNIFICANT CHANGE UP (ref 22–29)
HCT VFR BLD CALC: 46.3 % — SIGNIFICANT CHANGE UP (ref 37–47)
HCT VFR BLD CALC: 46.3 % — SIGNIFICANT CHANGE UP (ref 37–47)
HCT VFR BLDA CALC: 45 % — SIGNIFICANT CHANGE UP (ref 34.5–46.5)
HCT VFR BLDA CALC: 45 % — SIGNIFICANT CHANGE UP (ref 34.5–46.5)
HGB BLD CALC-MCNC: 15.1 G/DL — SIGNIFICANT CHANGE UP (ref 11.7–16.1)
HGB BLD CALC-MCNC: 15.1 G/DL — SIGNIFICANT CHANGE UP (ref 11.7–16.1)
HGB BLD-MCNC: 15 G/DL — SIGNIFICANT CHANGE UP (ref 12–16)
HGB BLD-MCNC: 15 G/DL — SIGNIFICANT CHANGE UP (ref 12–16)
IMM GRANULOCYTES NFR BLD AUTO: 0.3 % — SIGNIFICANT CHANGE UP (ref 0.1–0.3)
IMM GRANULOCYTES NFR BLD AUTO: 0.3 % — SIGNIFICANT CHANGE UP (ref 0.1–0.3)
LACTATE BLDV-MCNC: 1.9 MMOL/L — SIGNIFICANT CHANGE UP (ref 0.5–2)
LACTATE BLDV-MCNC: 1.9 MMOL/L — SIGNIFICANT CHANGE UP (ref 0.5–2)
LIDOCAIN IGE QN: 19 U/L — SIGNIFICANT CHANGE UP (ref 7–60)
LIDOCAIN IGE QN: 19 U/L — SIGNIFICANT CHANGE UP (ref 7–60)
LYMPHOCYTES # BLD AUTO: 1.01 K/UL — LOW (ref 1.2–3.4)
LYMPHOCYTES # BLD AUTO: 1.01 K/UL — LOW (ref 1.2–3.4)
LYMPHOCYTES # BLD AUTO: 9.6 % — LOW (ref 20.5–51.1)
LYMPHOCYTES # BLD AUTO: 9.6 % — LOW (ref 20.5–51.1)
MCHC RBC-ENTMCNC: 30.2 PG — SIGNIFICANT CHANGE UP (ref 27–31)
MCHC RBC-ENTMCNC: 30.2 PG — SIGNIFICANT CHANGE UP (ref 27–31)
MCHC RBC-ENTMCNC: 32.4 G/DL — SIGNIFICANT CHANGE UP (ref 32–37)
MCHC RBC-ENTMCNC: 32.4 G/DL — SIGNIFICANT CHANGE UP (ref 32–37)
MCV RBC AUTO: 93.3 FL — SIGNIFICANT CHANGE UP (ref 81–99)
MCV RBC AUTO: 93.3 FL — SIGNIFICANT CHANGE UP (ref 81–99)
MONOCYTES # BLD AUTO: 1.17 K/UL — HIGH (ref 0.1–0.6)
MONOCYTES # BLD AUTO: 1.17 K/UL — HIGH (ref 0.1–0.6)
MONOCYTES NFR BLD AUTO: 11.1 % — HIGH (ref 1.7–9.3)
MONOCYTES NFR BLD AUTO: 11.1 % — HIGH (ref 1.7–9.3)
NEUTROPHILS # BLD AUTO: 8.2 K/UL — HIGH (ref 1.4–6.5)
NEUTROPHILS # BLD AUTO: 8.2 K/UL — HIGH (ref 1.4–6.5)
NEUTROPHILS NFR BLD AUTO: 78 % — HIGH (ref 42.2–75.2)
NEUTROPHILS NFR BLD AUTO: 78 % — HIGH (ref 42.2–75.2)
NRBC # BLD: 0 /100 WBCS — SIGNIFICANT CHANGE UP (ref 0–0)
NRBC # BLD: 0 /100 WBCS — SIGNIFICANT CHANGE UP (ref 0–0)
PCO2 BLDV: 43 MMHG — HIGH (ref 39–42)
PCO2 BLDV: 43 MMHG — HIGH (ref 39–42)
PH BLDV: 7.37 — SIGNIFICANT CHANGE UP (ref 7.32–7.43)
PH BLDV: 7.37 — SIGNIFICANT CHANGE UP (ref 7.32–7.43)
PLATELET # BLD AUTO: 115 K/UL — LOW (ref 130–400)
PLATELET # BLD AUTO: 115 K/UL — LOW (ref 130–400)
PMV BLD: 13.5 FL — HIGH (ref 7.4–10.4)
PMV BLD: 13.5 FL — HIGH (ref 7.4–10.4)
PO2 BLDV: 35 MMHG — SIGNIFICANT CHANGE UP (ref 25–45)
PO2 BLDV: 35 MMHG — SIGNIFICANT CHANGE UP (ref 25–45)
POTASSIUM BLDV-SCNC: 5.3 MMOL/L — HIGH (ref 3.5–5.1)
POTASSIUM BLDV-SCNC: 5.3 MMOL/L — HIGH (ref 3.5–5.1)
POTASSIUM SERPL-MCNC: 5.6 MMOL/L — HIGH (ref 3.5–5)
POTASSIUM SERPL-MCNC: 5.6 MMOL/L — HIGH (ref 3.5–5)
POTASSIUM SERPL-SCNC: 5.6 MMOL/L — HIGH (ref 3.5–5)
POTASSIUM SERPL-SCNC: 5.6 MMOL/L — HIGH (ref 3.5–5)
PROT SERPL-MCNC: 7.1 G/DL — SIGNIFICANT CHANGE UP (ref 6–8)
PROT SERPL-MCNC: 7.1 G/DL — SIGNIFICANT CHANGE UP (ref 6–8)
RBC # BLD: 4.96 M/UL — SIGNIFICANT CHANGE UP (ref 4.2–5.4)
RBC # BLD: 4.96 M/UL — SIGNIFICANT CHANGE UP (ref 4.2–5.4)
RBC # FLD: 13.6 % — SIGNIFICANT CHANGE UP (ref 11.5–14.5)
RBC # FLD: 13.6 % — SIGNIFICANT CHANGE UP (ref 11.5–14.5)
SAO2 % BLDV: 54.2 % — LOW (ref 67–88)
SAO2 % BLDV: 54.2 % — LOW (ref 67–88)
SODIUM SERPL-SCNC: 139 MMOL/L — SIGNIFICANT CHANGE UP (ref 135–146)
SODIUM SERPL-SCNC: 139 MMOL/L — SIGNIFICANT CHANGE UP (ref 135–146)
TROPONIN T, HIGH SENSITIVITY RESULT: 97 NG/L — CRITICAL HIGH (ref 6–13)
TROPONIN T, HIGH SENSITIVITY RESULT: 97 NG/L — CRITICAL HIGH (ref 6–13)
WBC # BLD: 10.51 K/UL — SIGNIFICANT CHANGE UP (ref 4.8–10.8)
WBC # BLD: 10.51 K/UL — SIGNIFICANT CHANGE UP (ref 4.8–10.8)
WBC # FLD AUTO: 10.51 K/UL — SIGNIFICANT CHANGE UP (ref 4.8–10.8)
WBC # FLD AUTO: 10.51 K/UL — SIGNIFICANT CHANGE UP (ref 4.8–10.8)

## 2023-12-23 PROCEDURE — 70450 CT HEAD/BRAIN W/O DYE: CPT | Mod: 26,MA

## 2023-12-23 PROCEDURE — 70450 CT HEAD/BRAIN W/O DYE: CPT | Mod: MA

## 2023-12-23 PROCEDURE — 85025 COMPLETE CBC W/AUTO DIFF WBC: CPT

## 2023-12-23 PROCEDURE — 82330 ASSAY OF CALCIUM: CPT

## 2023-12-23 PROCEDURE — 84484 ASSAY OF TROPONIN QUANT: CPT

## 2023-12-23 PROCEDURE — 83690 ASSAY OF LIPASE: CPT

## 2023-12-23 PROCEDURE — 85018 HEMOGLOBIN: CPT

## 2023-12-23 PROCEDURE — 71045 X-RAY EXAM CHEST 1 VIEW: CPT

## 2023-12-23 PROCEDURE — 80053 COMPREHEN METABOLIC PANEL: CPT

## 2023-12-23 PROCEDURE — 99285 EMERGENCY DEPT VISIT HI MDM: CPT | Mod: GC

## 2023-12-23 PROCEDURE — 83605 ASSAY OF LACTIC ACID: CPT

## 2023-12-23 PROCEDURE — 93010 ELECTROCARDIOGRAM REPORT: CPT

## 2023-12-23 PROCEDURE — 81001 URINALYSIS AUTO W/SCOPE: CPT

## 2023-12-23 PROCEDURE — 82803 BLOOD GASES ANY COMBINATION: CPT

## 2023-12-23 PROCEDURE — 84295 ASSAY OF SERUM SODIUM: CPT

## 2023-12-23 PROCEDURE — 36415 COLL VENOUS BLD VENIPUNCTURE: CPT

## 2023-12-23 PROCEDURE — 85014 HEMATOCRIT: CPT

## 2023-12-23 PROCEDURE — 71045 X-RAY EXAM CHEST 1 VIEW: CPT | Mod: 26

## 2023-12-23 PROCEDURE — 82962 GLUCOSE BLOOD TEST: CPT

## 2023-12-23 PROCEDURE — 84132 ASSAY OF SERUM POTASSIUM: CPT

## 2023-12-23 PROCEDURE — 93005 ELECTROCARDIOGRAM TRACING: CPT

## 2023-12-23 PROCEDURE — 87086 URINE CULTURE/COLONY COUNT: CPT

## 2023-12-23 PROCEDURE — 99285 EMERGENCY DEPT VISIT HI MDM: CPT | Mod: 25

## 2023-12-23 RX ORDER — SODIUM CHLORIDE 9 MG/ML
1000 INJECTION, SOLUTION INTRAVENOUS ONCE
Refills: 0 | Status: COMPLETED | OUTPATIENT
Start: 2023-12-23 | End: 2023-12-23

## 2023-12-23 RX ORDER — ACETAMINOPHEN 500 MG
975 TABLET ORAL ONCE
Refills: 0 | Status: COMPLETED | OUTPATIENT
Start: 2023-12-23 | End: 2023-12-23

## 2023-12-23 RX ADMIN — Medication 975 MILLIGRAM(S): at 21:57

## 2023-12-23 RX ADMIN — SODIUM CHLORIDE 1000 MILLILITER(S): 9 INJECTION, SOLUTION INTRAVENOUS at 21:47

## 2023-12-23 NOTE — ED ADULT NURSE NOTE - NSFALLHARMRISKINTERV_ED_ALL_ED
Assistance OOB with selected safe patient handling equipment if applicable/Assistance with ambulation/Communicate risk of Fall with Harm to all staff, patient, and family/Encourage patient to sit up slowly, dangle for a short time, stand at bedside before walking/Monitor gait and stability/Monitor for mental status changes and reorient to person, place, and time, as needed/Move patient closer to nursing station/within visual sight of ED staff/Provide visual cue: red socks, yellow wristband, yellow gown, etc/Reinforce activity limits and safety measures with patient and family/Review medications for side effects contributing to fall risk/Toileting schedule using arm’s reach rule for commode and bathroom/Use of alarms - bed, stretcher, chair and/or video monitoring/Bed in lowest position, wheels locked, appropriate side rails in place/Call bell, personal items and telephone in reach/Instruct patient to call for assistance before getting out of bed/chair/stretcher/Non-slip footwear applied when patient is off stretcher/[place] to call system/Physically safe environment - no spills, clutter or unnecessary equipment/Purposeful Proactive Rounding/Room/bathroom lighting operational, light cord in reach Assistance OOB with selected safe patient handling equipment if applicable/Assistance with ambulation/Communicate risk of Fall with Harm to all staff, patient, and family/Encourage patient to sit up slowly, dangle for a short time, stand at bedside before walking/Monitor gait and stability/Monitor for mental status changes and reorient to person, place, and time, as needed/Move patient closer to nursing station/within visual sight of ED staff/Provide visual cue: red socks, yellow wristband, yellow gown, etc/Reinforce activity limits and safety measures with patient and family/Review medications for side effects contributing to fall risk/Toileting schedule using arm’s reach rule for commode and bathroom/Use of alarms - bed, stretcher, chair and/or video monitoring/Bed in lowest position, wheels locked, appropriate side rails in place/Call bell, personal items and telephone in reach/Instruct patient to call for assistance before getting out of bed/chair/stretcher/Non-slip footwear applied when patient is off stretcher/[place] to call system/Physically safe environment - no spills, clutter or unnecessary equipment/Purposeful Proactive Rounding/Room/bathroom lighting operational, light cord in reach

## 2023-12-23 NOTE — ED ADULT NURSE NOTE - CHIEF COMPLAINT QUOTE
pt bibems from restaurant [facility] syncope,  had angioplasty yesterday dced today from [facility]. on scene bp 80/40 fs 116 co headache. pt bibems from restaurant co syncope,  had angioplasty yesterday dced today from [facility]. on scene bp 80/40 fs 116 co headache.

## 2023-12-23 NOTE — ED ADULT TRIAGE NOTE - CHIEF COMPLAINT QUOTE
pt bibems from restaurant co syncope,  had angioplasty yesterday dced today from [facility]. on scene bp 80/40 fs 116 co headache. pt bibems from restaurant co syncope,  had angioplasty yesterday dced today from [facility]. on scene bp 80/40 fs 116 co headache.

## 2023-12-23 NOTE — ED PROVIDER NOTE - NSICDXFAMILYHX_GEN_ALL_CORE_FT
FAMILY HISTORY:  No pertinent family history in first degree relatives Keystone Flap Text: The defect edges were debeveled with a #15 scalpel blade.  Given the location of the defect, shape of the defect a keystone flap was deemed most appropriate.  Using a sterile surgical marker, an appropriate keystone flap was drawn incorporating the defect, outlining the appropriate donor tissue and placing the expected incisions within the relaxed skin tension lines where possible. The area thus outlined was incised deep to adipose tissue with a #15 scalpel blade.  The skin margins were undermined to an appropriate distance in all directions around the primary defect and laterally outward around the flap utilizing iris scissors.

## 2023-12-23 NOTE — ED PROVIDER NOTE - NSPREEXISTRELATION_ED_A_ED_FT
Patient has a recent cardiac procedure on 12/22/23 at [place] (Dr. Finch). Patient has a recent cardiac procedure on 12/22/23 at [place] (Dr. Finch).

## 2023-12-23 NOTE — ED PROVIDER NOTE - CLINICAL SUMMARY MEDICAL DECISION MAKING FREE TEXT BOX
83 year old female with severe as, glaucoma, blindness, htn, and hyperlipidemia presenting for syncope. Per daughter, patient was discharged from [place] earlier this morning after an angioplasty procedure and s/p BAV, w/ planned TAVR on 1/9/2024 BIBEMS after syncope. Patient's was discharged this morning after procedure, went to dinner with her daughter and had an episode of syncope.  Patient is oriented x 2-3  bedside, per patient's daughter she is slightly more confused than baseline.  No trauma.  Denies chest pain or shortness of breath only complaint is frontal headache which she states that she has had for 3 days secondary to being n.p.o. at [facility].  EKG independently interpreted by me Dr. Luna with new ischemic changes as documented, chest x-ray images independently interpreted by me with no focal opacities.  Labs reviewed, high-sensitivity troponin elevated.  CT head with no acute pathology.  Patient given IV fluids, Tylenol for headache.  Discussed case with Dr. Finch at [facility], requesting transfer. family updated. 83 year old female with severe as, glaucoma, blindness, htn, and hyperlipidemia presenting for syncope. Per daughter, patient was discharged from [place] earlier this morning after an angioplasty procedure and s/p BAV, w/ planned TAVR on 1/9/2024 BIBEMS after syncope. Patient's was discharged this morning after procedure, went to dinner with her daughter and had an episode of syncope.  Patient is oriented x 2-3  bedside, per patient's daughter she is slightly more confused than baseline.  No trauma.  Denies chest pain or shortness of breath only complaint is frontal headache which she states that she has had for 3 days secondary to being n.p.o. at [facility].  EKG independently interpreted by me Dr. Luna with new ischemic changes as documented, chest x-ray images independently interpreted by me with no focal opacities.  Labs reviewed, high-sensitivity troponin elevated.  CT head with no acute pathology.  Patient given IV fluids, Tylenol for headache.  Discussed case with Dr. Finch at [facility], requesting transfer. family updated.

## 2023-12-23 NOTE — ED ADULT NURSE NOTE - OBJECTIVE STATEMENT
Patient in NAD noted to be sleepy, arouses to voice. As per daughter bedside pt had angioplasty yesterday at [facility] and tonight at dinner had syncopal episode, did not fall. Patient in NAD noted to be sleepy, arouses to voice. As per daughter bedside pt had angioplasty yesterday at [facility] and tonight at dinner had syncopal episode, did not fall.

## 2023-12-23 NOTE — ED PROVIDER NOTE - OBJECTIVE STATEMENT
Patient is an 83 year old female with severe as, glaucoma, blindness, htn, and hyperlipidemia with a recent transfer to [place] for TAVR placement presenting for syncope. Patient is an 83 year old female with severe as, glaucoma, blindness, htn, and hyperlipidemia with a recent transfer to [place] for TAVR placement presenting for syncope. Patient is an 83 year old female with severe as, glaucoma, blindness, htn, and hyperlipidemia presenting for syncope. Per daughter, patient was discharged from [place] earlier this morning after an angioplasty procedure (pending future TAVR procedure). Tonight, patient was at dinner when she suddenly became unresponsive for several seconds at the table. Patient states she does not believe she lost consciousness and is only endorsing a headache, which she has had for the past three days. Patient denies chest pain, shortness of breath, fevers, nausea, vomiting, abdominal pain, calf pain or tenderness, or additional complaints. Patient is an 83 year old female with severe as, glaucoma, blindness, htn, and hyperlipidemia presenting for syncope. Per daughter, patient was discharged from [place] earlier this morning after an angioplasty procedure (pending future TAVR procedure). Tonight, patient was at dinner when she suddenly became unresponsive for several seconds at the table. Patient states she does not believe she lost consciousness and is only endorsing a headache, which she has had for the past three days. Patient denies chest pain, shortness of breath, fevers, nausea, vomiting, abdominal pain, calf pain or tenderness, or additional complaints. Patient is an 83 year old female with severe as, glaucoma, blindness, htn, and hyperlipidemia presenting for syncope. Per daughter, patient was discharged from [place] earlier this morning after an angioplasty procedure and s/p BAV, w/ planned TAVR on 1/9/2024. Tonight, patient was at dinner when she suddenly became unresponsive for several seconds at the table. Patient states she does not believe she lost consciousness and is only endorsing a headache, which she has had for the past three days. Patient denies chest pain, shortness of breath, fevers, nausea, vomiting, abdominal pain, calf pain or tenderness, or additional complaints. Patient is an 83 year old female with severe as, glaucoma, blindness, htn, and hyperlipidemia presenting for syncope. Per daughter, patient was discharged from [place] earlier this morning after an angioplasty procedure and s/p BAV, w/ planned TAVR on 1/9/2024. Tonight, patient was at dinner when she suddenly became unresponsive for several seconds at the table. Patient states she does not believe she lost consciousness and is only endorsing a headache, which she has had for the past three days. Patient denies chest pain, shortness of breath, fevers, nausea, vomiting, abdominal pain, calf pain or tenderness, or additional complaints.

## 2023-12-23 NOTE — CHART NOTE - NSCHARTNOTEFT_GEN_A_CORE
Called to evaluate patient with ? syncope after discharge from [facility] and concerning EKG.     In brief, 82 yo F w severe AS s/p BAV at [place] yesterday -12/22/2023 with no resultant AI, gradients came down to 20 from 40, planned for TAVR on 1/9/2024, non obstructive RCA dz, HOCM, HTN,HLD, blindness, pt of Dr. Smith was discharged from [facility] today, after which she went to a restaurant to eat, had an episode when she was less responsive per the daughter  and was difficult to arouse. On EMS arrival, BP 80/40s. Pt without c/o chest pain, SOB, dizziness.   EKG with ST segment 2 mm elevation in V3 and 1.5 mm elevation in V2. No Q waves. /72 . HST 97     Likely orthostatic hypotension leading to pre-syncope/syncope and she is especially preload dependent given HOCM and thus sensitive to volume depletion.   -reassess BP after fluid bolus   -unlikely to be ACS given non obstructive CAD and no c/o CP.  -f/u Dr. Smith in AM. Called to evaluate patient with ? syncope after discharge from [facility] and concerning EKG.     In brief, 84 yo F w severe AS s/p BAV at [place] yesterday -12/22/2023 with no resultant AI, gradients came down to 20 from 40, planned for TAVR on 1/9/2024, non obstructive RCA dz, HOCM, HTN,HLD, blindness, pt of Dr. Smith was discharged from [facility] today, after which she went to a restaurant to eat, had an episode when she was less responsive per the daughter  and was difficult to arouse. On EMS arrival, BP 80/40s. Pt without c/o chest pain, SOB, dizziness.   EKG with ST segment 2 mm elevation in V3 and 1.5 mm elevation in V2. No Q waves. /72 . HST 97     Likely orthostatic hypotension leading to pre-syncope/syncope and she is especially preload dependent given HOCM and thus sensitive to volume depletion.   -reassess BP after fluid bolus   -unlikely to be ACS given non obstructive CAD and no c/o CP.  -f/u Dr. Smith in AM.

## 2023-12-23 NOTE — ED PROVIDER NOTE - CONSIDERATION OF ADMISSION OBSERVATION
given recent cardiac intervention, need to r/o cardiac cause of syncope Consideration of Admission/Observation

## 2023-12-23 NOTE — ED PROVIDER NOTE - CCCP TRG CHIEF CMPLNT
Called and lvm advising patient's mother to return call to Intake Dept at [phone]      Patient is to be scheduled with [facility] in July, as per Marnie ZHANG  syncope

## 2023-12-23 NOTE — ED PROVIDER NOTE - CARE PLAN
Principal Discharge DX:	Syncope  Secondary Diagnosis:	Elevated troponin  Secondary Diagnosis:	Aortic stenosis  Secondary Diagnosis:	Altered mental status   1

## 2023-12-24 VITALS
SYSTOLIC BLOOD PRESSURE: 116 MMHG | DIASTOLIC BLOOD PRESSURE: 65 MMHG | RESPIRATION RATE: 17 BRPM | HEART RATE: 69 BPM | TEMPERATURE: 98 F | OXYGEN SATURATION: 98 %

## 2023-12-24 LAB
APPEARANCE UR: ABNORMAL
APPEARANCE UR: ABNORMAL
BACTERIA # UR AUTO: NEGATIVE /HPF — SIGNIFICANT CHANGE UP
BACTERIA # UR AUTO: NEGATIVE /HPF — SIGNIFICANT CHANGE UP
BILIRUB UR-MCNC: NEGATIVE — SIGNIFICANT CHANGE UP
BILIRUB UR-MCNC: NEGATIVE — SIGNIFICANT CHANGE UP
CAST: 3 /LPF — SIGNIFICANT CHANGE UP (ref 0–4)
CAST: 3 /LPF — SIGNIFICANT CHANGE UP (ref 0–4)
COLOR SPEC: SIGNIFICANT CHANGE UP
COLOR SPEC: SIGNIFICANT CHANGE UP
DIFF PNL FLD: NEGATIVE — SIGNIFICANT CHANGE UP
DIFF PNL FLD: NEGATIVE — SIGNIFICANT CHANGE UP
GLUCOSE UR QL: 250 MG/DL
GLUCOSE UR QL: 250 MG/DL
KETONES UR-MCNC: 15 MG/DL
KETONES UR-MCNC: 15 MG/DL
LEUKOCYTE ESTERASE UR-ACNC: NEGATIVE — SIGNIFICANT CHANGE UP
LEUKOCYTE ESTERASE UR-ACNC: NEGATIVE — SIGNIFICANT CHANGE UP
NITRITE UR-MCNC: NEGATIVE — SIGNIFICANT CHANGE UP
NITRITE UR-MCNC: NEGATIVE — SIGNIFICANT CHANGE UP
PH UR: 5.5 — SIGNIFICANT CHANGE UP (ref 5–8)
PH UR: 5.5 — SIGNIFICANT CHANGE UP (ref 5–8)
PROT UR-MCNC: 30 MG/DL
PROT UR-MCNC: 30 MG/DL
RBC CASTS # UR COMP ASSIST: 3 /HPF — SIGNIFICANT CHANGE UP (ref 0–4)
RBC CASTS # UR COMP ASSIST: 3 /HPF — SIGNIFICANT CHANGE UP (ref 0–4)
SP GR SPEC: >1.03 — HIGH (ref 1–1.03)
SP GR SPEC: >1.03 — HIGH (ref 1–1.03)
SQUAMOUS # UR AUTO: 1 /HPF — SIGNIFICANT CHANGE UP (ref 0–5)
SQUAMOUS # UR AUTO: 1 /HPF — SIGNIFICANT CHANGE UP (ref 0–5)
TROPONIN T, HIGH SENSITIVITY RESULT: 80 NG/L — CRITICAL HIGH (ref 6–13)
TROPONIN T, HIGH SENSITIVITY RESULT: 80 NG/L — CRITICAL HIGH (ref 6–13)
UROBILINOGEN FLD QL: 1 MG/DL — SIGNIFICANT CHANGE UP (ref 0.2–1)
UROBILINOGEN FLD QL: 1 MG/DL — SIGNIFICANT CHANGE UP (ref 0.2–1)
WBC UR QL: 3 /HPF — SIGNIFICANT CHANGE UP (ref 0–5)
WBC UR QL: 3 /HPF — SIGNIFICANT CHANGE UP (ref 0–5)

## 2023-12-24 NOTE — ED ADULT NURSE REASSESSMENT NOTE - NS ED NURSE REASSESS COMMENT FT1
Pt is being transferred to hospital [place] to be under the care of her cardiologist Dr. Finch. EMS has arrived. Transfer huddle activated. Pt is stable. VS repeated. Report given to accepting facility to RICHIE [facility]. Pt is going to [facility] TELE Bed 22. EMS is awaiting approval to take patient to receiving facility from [facility]. Pt is being transferred to hospital [place] to be under the care of her cardiologist Dr. Finch. EMS has arrived. Transfer huddle activated. Pt is stable. VS repeated. Report given to accepting facility to RICHIE [facility]. Pt is going to [facility] TELE Bed 22. EMS is awaiting approval to take patient to receiving facility from [place].

## 2023-12-24 NOTE — ED ADULT NURSE REASSESSMENT NOTE - NS ED NURSE REASSESS COMMENT FT1
EMS has come and picked up the patient to take to [facility] EMS has come and picked up the patient to take to [facility]

## 2023-12-25 LAB
CULTURE RESULTS: SIGNIFICANT CHANGE UP
CULTURE RESULTS: SIGNIFICANT CHANGE UP
SPECIMEN SOURCE: SIGNIFICANT CHANGE UP
SPECIMEN SOURCE: SIGNIFICANT CHANGE UP

## 2023-12-27 DIAGNOSIS — R55 SYNCOPE AND COLLAPSE: ICD-10-CM

## 2023-12-27 DIAGNOSIS — I35.0 NONRHEUMATIC AORTIC (VALVE) STENOSIS: ICD-10-CM

## 2023-12-27 DIAGNOSIS — H54.8 LEGAL BLINDNESS, AS DEFINED IN USA: ICD-10-CM

## 2023-12-27 DIAGNOSIS — I10 ESSENTIAL (PRIMARY) HYPERTENSION: ICD-10-CM

## 2023-12-27 DIAGNOSIS — H40.9 UNSPECIFIED GLAUCOMA: ICD-10-CM

## 2024-08-06 NOTE — DISCHARGE NOTE PROVIDER - TIME SPENT: (MINUTES SPENT ON THE DISCHARGE SERVICE)
[facility] Emergency Department Medical Screening Exam Note    Patient: Grupo Gamble Age: 68 year old Sex: male   MRN: [id number] : 1956 Encounter Date: 2024     Vitals:    24   BP:  (!) 164/91   Pulse:  78   Resp:  16   Temp:  97.9 °F (36.6 °C)   TempSrc:  Oral   SpO2:  98%   Weight: 105 kg (231 lb 7.7 oz)        Grupo Gamble is a 68 year old male who presents to the emergency department with abdominal pain.  Hx of colostomy bag after colon resection due to cancer.  Hx of SBO.  Nausea today, no vomiting.  Very little output in ostomy bag since this morning.  Diffuse abdominal pain.       Brief Physical Exam     NAD.        Preliminary Orders  Orders Placed This Encounter    CBC with Automated Differential    Comprehensive Metabolic Panel    Lactic Acid Venous With Reflex    Lipase    Magnesium    Urinalysis With Microscopy & Culture If Indicated    Oxygen Therapy PRN greater than or equal to 92%    Insert IV    Blood Culture    Routine Vital Signs    iohexol ORAL (OMNIPAQUE 350) oral contrast solution 12.5 mL    diphenhydrAMINE (BENADRYL) injection 50 mg    methylPREDNISolone (SOLU-Medrol) injection 40 mg       This patient was screened by me for the purpose of initial evaluation.  I have performed a medical screening examination and placed preliminary orders.    2024  ANTHONY Jean Valerie A, PA-C  24     35

## 2024-09-05 NOTE — H&P ADULT - NSHPPOADEEPVENOUSTHROMB_GEN_A_CORE
With subsequent improvement in EF to 50% no sx of SOB. I would consider revising regimen since she's been taking it so long and LV improved even before revascularization.   no

## 2025-02-25 ENCOUNTER — EMERGENCY (EMERGENCY)
Facility: HOSPITAL | Age: 85
LOS: 0 days | Discharge: HOME | End: 2025-02-25
Attending: EMERGENCY MEDICINE
Payer: MEDICARE

## 2025-02-25 VITALS
RESPIRATION RATE: 16 BRPM | HEART RATE: 67 BPM | TEMPERATURE: 98 F | DIASTOLIC BLOOD PRESSURE: 78 MMHG | SYSTOLIC BLOOD PRESSURE: 153 MMHG | OXYGEN SATURATION: 95 %

## 2025-02-25 DIAGNOSIS — Z79.82 LONG TERM (CURRENT) USE OF ASPIRIN: ICD-10-CM

## 2025-02-25 DIAGNOSIS — H40.9 UNSPECIFIED GLAUCOMA: ICD-10-CM

## 2025-02-25 DIAGNOSIS — Z91.048 OTHER NONMEDICINAL SUBSTANCE ALLERGY STATUS: ICD-10-CM

## 2025-02-25 DIAGNOSIS — R53.1 WEAKNESS: ICD-10-CM

## 2025-02-25 DIAGNOSIS — R55 SYNCOPE AND COLLAPSE: ICD-10-CM

## 2025-02-25 DIAGNOSIS — H54.7 UNSPECIFIED VISUAL LOSS: ICD-10-CM

## 2025-02-25 DIAGNOSIS — Z95.4 PRESENCE OF OTHER HEART-VALVE REPLACEMENT: ICD-10-CM

## 2025-02-25 DIAGNOSIS — I10 ESSENTIAL (PRIMARY) HYPERTENSION: ICD-10-CM

## 2025-02-25 DIAGNOSIS — Z91.040 LATEX ALLERGY STATUS: ICD-10-CM

## 2025-02-25 PROBLEM — I35.0 NONRHEUMATIC AORTIC (VALVE) STENOSIS: Chronic | Status: ACTIVE | Noted: 2023-12-23

## 2025-02-25 PROBLEM — Z86.69 PERSONAL HISTORY OF OTHER DISEASES OF THE NERVOUS SYSTEM AND SENSE ORGANS: Chronic | Status: ACTIVE | Noted: 2023-12-23

## 2025-02-25 LAB
ACANTHOCYTES BLD QL SMEAR: SLIGHT — SIGNIFICANT CHANGE UP
ALBUMIN SERPL ELPH-MCNC: 4.1 G/DL — SIGNIFICANT CHANGE UP (ref 3.5–5.2)
ALP SERPL-CCNC: 93 U/L — SIGNIFICANT CHANGE UP (ref 30–115)
ALT FLD-CCNC: 7 U/L — SIGNIFICANT CHANGE UP (ref 0–41)
ANION GAP SERPL CALC-SCNC: 8 MMOL/L — SIGNIFICANT CHANGE UP (ref 7–14)
ANISOCYTOSIS BLD QL: SLIGHT — SIGNIFICANT CHANGE UP
APPEARANCE UR: CLEAR — SIGNIFICANT CHANGE UP
AST SERPL-CCNC: 17 U/L — SIGNIFICANT CHANGE UP (ref 0–41)
BACTERIA # UR AUTO: NEGATIVE /HPF — SIGNIFICANT CHANGE UP
BASE EXCESS BLDV CALC-SCNC: 1.2 MMOL/L — SIGNIFICANT CHANGE UP (ref -2–3)
BASOPHILS # BLD AUTO: 0 K/UL — SIGNIFICANT CHANGE UP (ref 0–0.2)
BASOPHILS NFR BLD AUTO: 0 % — SIGNIFICANT CHANGE UP (ref 0–1)
BILIRUB SERPL-MCNC: 0.6 MG/DL — SIGNIFICANT CHANGE UP (ref 0.2–1.2)
BILIRUB UR-MCNC: NEGATIVE — SIGNIFICANT CHANGE UP
BUN SERPL-MCNC: 12 MG/DL — SIGNIFICANT CHANGE UP (ref 10–20)
CA-I SERPL-SCNC: 1.26 MMOL/L — SIGNIFICANT CHANGE UP (ref 1.15–1.33)
CALCIUM SERPL-MCNC: 9.3 MG/DL — SIGNIFICANT CHANGE UP (ref 8.4–10.4)
CAST: 2 /LPF — SIGNIFICANT CHANGE UP (ref 0–4)
CHLORIDE SERPL-SCNC: 108 MMOL/L — SIGNIFICANT CHANGE UP (ref 98–110)
CO2 SERPL-SCNC: 25 MMOL/L — SIGNIFICANT CHANGE UP (ref 17–32)
COLOR SPEC: YELLOW — SIGNIFICANT CHANGE UP
CREAT SERPL-MCNC: 0.7 MG/DL — SIGNIFICANT CHANGE UP (ref 0.7–1.5)
DIFF PNL FLD: NEGATIVE — SIGNIFICANT CHANGE UP
EGFR: 84 ML/MIN/1.73M2 — SIGNIFICANT CHANGE UP
EOSINOPHIL # BLD AUTO: 0.08 K/UL — SIGNIFICANT CHANGE UP (ref 0–0.7)
EOSINOPHIL NFR BLD AUTO: 0.9 % — SIGNIFICANT CHANGE UP (ref 0–8)
GAS PNL BLDV: 137 MMOL/L — SIGNIFICANT CHANGE UP (ref 136–145)
GAS PNL BLDV: SIGNIFICANT CHANGE UP
GAS PNL BLDV: SIGNIFICANT CHANGE UP
GIANT PLATELETS BLD QL SMEAR: PRESENT — SIGNIFICANT CHANGE UP
GLUCOSE SERPL-MCNC: 102 MG/DL — HIGH (ref 70–99)
GLUCOSE UR QL: NEGATIVE MG/DL — SIGNIFICANT CHANGE UP
HCO3 BLDV-SCNC: 27 MMOL/L — SIGNIFICANT CHANGE UP (ref 22–29)
HCT VFR BLD CALC: 34.7 % — LOW (ref 37–47)
HCT VFR BLDA CALC: 32 % — LOW (ref 34.5–46.5)
HGB BLD CALC-MCNC: 10.6 G/DL — LOW (ref 11.7–16.1)
HGB BLD-MCNC: 10.3 G/DL — LOW (ref 12–16)
KETONES UR-MCNC: NEGATIVE MG/DL — SIGNIFICANT CHANGE UP
LACTATE BLDV-MCNC: 1.4 MMOL/L — SIGNIFICANT CHANGE UP (ref 0.5–2)
LEUKOCYTE ESTERASE UR-ACNC: ABNORMAL
LYMPHOCYTES # BLD AUTO: 0.95 K/UL — LOW (ref 1.2–3.4)
LYMPHOCYTES # BLD AUTO: 10.7 % — LOW (ref 20.5–51.1)
MANUAL SMEAR VERIFICATION: SIGNIFICANT CHANGE UP
MCHC RBC-ENTMCNC: 23.6 PG — LOW (ref 27–31)
MCHC RBC-ENTMCNC: 29.7 G/DL — LOW (ref 32–37)
MCV RBC AUTO: 79.4 FL — LOW (ref 81–99)
MICROCYTES BLD QL: SLIGHT — SIGNIFICANT CHANGE UP
MONOCYTES # BLD AUTO: 0.4 K/UL — SIGNIFICANT CHANGE UP (ref 0.1–0.6)
MONOCYTES NFR BLD AUTO: 4.5 % — SIGNIFICANT CHANGE UP (ref 1.7–9.3)
NEUTROPHILS # BLD AUTO: 7.35 K/UL — HIGH (ref 1.4–6.5)
NEUTROPHILS NFR BLD AUTO: 83 % — HIGH (ref 42.2–75.2)
NITRITE UR-MCNC: NEGATIVE — SIGNIFICANT CHANGE UP
NRBC # BLD: 1 /100 WBCS — HIGH (ref 0–0)
NRBC BLD AUTO-RTO: SIGNIFICANT CHANGE UP /100 WBCS (ref 0–0)
NRBC BLD-RTO: 1 /100 WBCS — HIGH (ref 0–0)
OVALOCYTES BLD QL SMEAR: SLIGHT — SIGNIFICANT CHANGE UP
PCO2 BLDV: 48 MMHG — HIGH (ref 39–42)
PH BLDV: 7.36 — SIGNIFICANT CHANGE UP (ref 7.32–7.43)
PH UR: 5.5 — SIGNIFICANT CHANGE UP (ref 5–8)
PLAT MORPH BLD: NORMAL — SIGNIFICANT CHANGE UP
PLATELET # BLD AUTO: 139 K/UL — SIGNIFICANT CHANGE UP (ref 130–400)
PMV BLD: 12.4 FL — HIGH (ref 7.4–10.4)
PO2 BLDV: 21 MMHG — LOW (ref 25–45)
POIKILOCYTOSIS BLD QL AUTO: SLIGHT — SIGNIFICANT CHANGE UP
POLYCHROMASIA BLD QL SMEAR: SLIGHT — SIGNIFICANT CHANGE UP
POTASSIUM BLDV-SCNC: 5 MMOL/L — SIGNIFICANT CHANGE UP (ref 3.5–5.1)
POTASSIUM SERPL-MCNC: 4.9 MMOL/L — SIGNIFICANT CHANGE UP (ref 3.5–5)
POTASSIUM SERPL-SCNC: 4.9 MMOL/L — SIGNIFICANT CHANGE UP (ref 3.5–5)
PROT SERPL-MCNC: 6.6 G/DL — SIGNIFICANT CHANGE UP (ref 6–8)
PROT UR-MCNC: SIGNIFICANT CHANGE UP MG/DL
RBC # BLD: 4.37 M/UL — SIGNIFICANT CHANGE UP (ref 4.2–5.4)
RBC # FLD: 20.1 % — HIGH (ref 11.5–14.5)
RBC BLD AUTO: ABNORMAL
RBC CASTS # UR COMP ASSIST: 2 /HPF — SIGNIFICANT CHANGE UP (ref 0–4)
SAO2 % BLDV: 28.8 % — LOW (ref 67–88)
SCHISTOCYTES BLD QL AUTO: SLIGHT — SIGNIFICANT CHANGE UP
SMUDGE CELLS # BLD: PRESENT — SIGNIFICANT CHANGE UP
SODIUM SERPL-SCNC: 141 MMOL/L — SIGNIFICANT CHANGE UP (ref 135–146)
SP GR SPEC: 1.02 — SIGNIFICANT CHANGE UP (ref 1–1.03)
SQUAMOUS # UR AUTO: 2 /HPF — SIGNIFICANT CHANGE UP (ref 0–5)
TROPONIN T, HIGH SENSITIVITY RESULT: 14 NG/L — HIGH (ref 6–13)
TROPONIN T, HIGH SENSITIVITY RESULT: 15 NG/L — HIGH (ref 6–13)
UROBILINOGEN FLD QL: 1 MG/DL — SIGNIFICANT CHANGE UP (ref 0.2–1)
VARIANT LYMPHS # BLD: 0.9 % — SIGNIFICANT CHANGE UP (ref 0–5)
VARIANT LYMPHS NFR BLD MANUAL: 0.9 % — SIGNIFICANT CHANGE UP (ref 0–5)
WBC # BLD: 8.86 K/UL — SIGNIFICANT CHANGE UP (ref 4.8–10.8)
WBC # FLD AUTO: 8.86 K/UL — SIGNIFICANT CHANGE UP (ref 4.8–10.8)
WBC UR QL: 5 /HPF — SIGNIFICANT CHANGE UP (ref 0–5)

## 2025-02-25 PROCEDURE — 85018 HEMOGLOBIN: CPT

## 2025-02-25 PROCEDURE — 71045 X-RAY EXAM CHEST 1 VIEW: CPT

## 2025-02-25 PROCEDURE — 36000 PLACE NEEDLE IN VEIN: CPT

## 2025-02-25 PROCEDURE — 71045 X-RAY EXAM CHEST 1 VIEW: CPT | Mod: 26

## 2025-02-25 PROCEDURE — 70450 CT HEAD/BRAIN W/O DYE: CPT

## 2025-02-25 PROCEDURE — 87077 CULTURE AEROBIC IDENTIFY: CPT

## 2025-02-25 PROCEDURE — 70450 CT HEAD/BRAIN W/O DYE: CPT | Mod: 26

## 2025-02-25 PROCEDURE — 84484 ASSAY OF TROPONIN QUANT: CPT | Mod: 91

## 2025-02-25 PROCEDURE — 99284 EMERGENCY DEPT VISIT MOD MDM: CPT

## 2025-02-25 PROCEDURE — 99285 EMERGENCY DEPT VISIT HI MDM: CPT | Mod: 25

## 2025-02-25 PROCEDURE — 81001 URINALYSIS AUTO W/SCOPE: CPT

## 2025-02-25 PROCEDURE — 82330 ASSAY OF CALCIUM: CPT

## 2025-02-25 PROCEDURE — 36415 COLL VENOUS BLD VENIPUNCTURE: CPT

## 2025-02-25 PROCEDURE — 83605 ASSAY OF LACTIC ACID: CPT

## 2025-02-25 PROCEDURE — 84132 ASSAY OF SERUM POTASSIUM: CPT

## 2025-02-25 PROCEDURE — 84295 ASSAY OF SERUM SODIUM: CPT

## 2025-02-25 PROCEDURE — 87086 URINE CULTURE/COLONY COUNT: CPT

## 2025-02-25 PROCEDURE — 80053 COMPREHEN METABOLIC PANEL: CPT

## 2025-02-25 PROCEDURE — 82803 BLOOD GASES ANY COMBINATION: CPT

## 2025-02-25 PROCEDURE — 93005 ELECTROCARDIOGRAM TRACING: CPT

## 2025-02-25 PROCEDURE — 85014 HEMATOCRIT: CPT

## 2025-02-25 PROCEDURE — 85025 COMPLETE CBC W/AUTO DIFF WBC: CPT

## 2025-02-25 PROCEDURE — 93010 ELECTROCARDIOGRAM REPORT: CPT

## 2025-02-25 RX ORDER — BACTERIOSTATIC SODIUM CHLORIDE 0.9 %
500 VIAL (ML) INJECTION ONCE
Refills: 0 | Status: DISCONTINUED | OUTPATIENT
Start: 2025-02-25 | End: 2025-02-25

## 2025-02-25 NOTE — ED ADULT NURSE NOTE - CHIEF COMPLAINT QUOTE
BLAISE from [facility] assisted living for unresponsive episode during lunch  when EMS arrived pt was alert & oriented

## 2025-02-25 NOTE — ED PROVIDER NOTE - NSFOLLOWUPINSTRUCTIONS_ED_ALL_ED_FT
Follow up with your PMD this week.     Weakness    WHAT YOU NEED TO KNOW:    Weakness is a loss of muscle strength. It may be caused by brain, nerve, or muscle problems. Physical and mental conditions such as heart problems, pregnancy, dehydration, or depression may also cause weakness. Reactions to certain drugs can cause weakness. Parts of your body may become weak if you need to wear a cast or splint or have been on bed rest for a long time.    DISCHARGE INSTRUCTIONS:    Call 911 for any of the following:     You have any of the following signs of a stroke:   Numbness or drooping on one side of your face       Weakness in an arm or leg      Confusion or difficulty speaking      Dizziness, a severe headache, or vision loss      You lose feeling in your weakened body area.      You have electric shock-like feelings down your arms and legs when you flex or move your neck.      You have sudden or increased trouble speaking, swallowing, or breathing.    Return to the emergency department if:     You have severe pain in your back, arms, or legs that worsens.      You have sudden or worsened muscle weakness or loss of movement.      You are not able to control when you urinate or have a bowel movement.    Contact your healthcare provider if:     You feel depressed or anxious.       You have questions or concerns about your condition or care.     Manage weakness:     Use assistive devices as directed. These help protect you from injury. Examples include a walker or cane. Have someone install handrails in your home. These will help you get out of a bathtub or stand up from a toilet. Use a shower chair so you can sit while you shower. Sit down on the toilet or another chair to dry off and put on your clothes. Get help going up and down stairs if your legs are weak.       Go to physical or occupational therapy if directed. A physical therapist can teach you exercises to help strengthen weak muscles. An occupational therapist can show you ways to do your daily activities more easily. For example, light forks and spoons can be easier to use if you have hand weakness. You may also learn ways to organize your household items so you are not moving heavy items.      Balance rest with exercise. Exercise can help increase your muscle strength and energy. Do not exercise for long periods at a time. Take breaks often to rest. Too much exercise can cause muscle strain or make you more tired. Ask your healthcare provider how much exercise is right for you.      Eat a variety of healthy foods. Too much or too little food may cause weakness or tiredness. Ask your healthcare provider what a healthy amount of food is for you. Healthy foods include fruits, vegetables, whole-grain breads, low-fat dairy products, lean meats and fish, nuts, and cooked beans.      Do not smoke. Nicotine and other chemicals in cigarettes and cigars can make your symptoms worse, and can cause lung damage. Ask your healthcare provider for information if you currently smoke and need help to quit. E-cigarettes or smokeless tobacco still contain nicotine. Talk to your healthcare provider before you use these products.       Do not use caffeine, alcohol, or illegal drugs. These may cause muscle twitching, which could lead to worsened weakness.     Follow up with your healthcare provider as directed: Write down your questions so you remember to ask them during your visits.       © Copyright [employer] 2019 All illustrations and images included in CareNotes are the copyrighted property of [employer] or [employer].

## 2025-02-25 NOTE — ED ADULT TRIAGE NOTE - CHIEF COMPLAINT QUOTE
BLAISE from [place] assisted living for unresponsive episode during lunch  when EMS arrived pt was alert & oriented

## 2025-02-25 NOTE — ED PROVIDER NOTE - OBJECTIVE STATEMENT
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  Patient reports she just "was not feeling well today" but feels better now.  Denies fever, chills, cough, SOB, chest pain, syncope, nausea, vomiting, diarrhea, headache, dizziness.  Daughter at bedside states patient is at her baseline. [facility] staff states patient returned to baseline prior to ambulance arrival but still wanted her to be evaluated. They deny syncope, LOC, head trauma, fall, AMS.

## 2025-02-25 NOTE — ED ADULT TRIAGE NOTE - GLASGOW COMA SCALE: BEST MOTOR RESPONSE, MLM
(M6) obeys commands fall precautions/Similar extensive left frontal vasogenic edema extending across the corpus callosum. Evaluation for residual neoplasm is limited. Rightward midline shift of 9 mm is similar. Mass effect upon the frontal horns is similar. Basal cisterns are still visualized. No hydrocephalus. CT Head 9/7-MR Head 9/2- Left frontal heterogeneous lesion with cystic and solid components in surrounding vasogenic edema. Lesion is solitary. There is diffusion positivity around the periphery of the lesion. There is some susceptibility seen in association. Findings suggestive of a glioblastoma versus a metastasis. No diffusion restriction within the cystic portion of the lesion to suggest an abscess. Pt now s/p OR for resection/biopsy of brain mass 9/5, post hemorrhage w/ return to OR. Intubated 9/5. Post op course c/b GTC seizures Extubated on 9/9 movement

## 2025-02-25 NOTE — ED PROVIDER NOTE - PROGRESS NOTE DETAILS
Dr. Ngozi Dowd, DO: Pending UA. CR: Patient signed out to me by Dr. Gee pending rpt trop and urine. Patient feeling well, no acute complaints at this time. Ready for dc. Return precautions given. Patient to follow up with PMD this week.

## 2025-02-25 NOTE — ED PROVIDER NOTE - PATIENT PORTAL LINK FT
No You can access the FollowMyHealth Patient Portal offered by [facility] by registering at the following website: [email or web address]. By joining [facility]’s FollowMyHealth portal, you will also be able to view your health information using other applications (apps) compatible with our system.

## 2025-02-25 NOTE — ED PROVIDER NOTE - PHYSICAL EXAMINATION
GENERAL: Well-developed; well-nourished; in no acute distress.   SKIN: warm, dry, no rashes   HEAD: Normocephalic; atraumatic.  EYES: bilateral pupils unresponsive to light, EOMI, no conjunctival erythema  ENT: No nasal discharge; airway clear. MMM   NECK: Supple; non tender.  CARD: Regular rate and rhythm. S1, S2 normal; no murmurs, gallops, or rubs.   RESP: LCTAB; No wheezes, rales, rhonchi, or stridor.  ABD: soft, nontender, nondistended  EXT: Normal ROM.  No LE TTP or edema bilaterally.  NEURO: A/ox3, sensation intact, 5/5x4 strength, normal speech   PSYCH: Cooperative, appropriate.

## 2025-02-25 NOTE — ED PROVIDER NOTE - ATTENDING CONTRIBUTION TO CARE
84-year-old female to ED with near syncopal even at the [facility].  Patient was sitting there and had a near syncopal event.  No fevers no sick contacts or travels or trauma.  No injury or fall.  Patient states she is back to her baseline self .  Daughter at bedside who will stay with her at the facility.  Afebrile vital signs stable exam as noted clear lungs bilaterally conjunctiva pink HEENT normal,  blind, regular rate and rhythm abdomen soft nontender and neuro nonfocal.

## 2025-02-27 LAB
CULTURE RESULTS: ABNORMAL
SPECIMEN SOURCE: SIGNIFICANT CHANGE UP

## 2025-03-06 NOTE — DISCHARGE NOTE PROVIDER - YES NO FOR MLM POSITIVE OR NEGATIVE COVID RESULT
Hypertension.  Patient's blood pressures are stable.  Due to her weight loss she was advised to discontinue her hydrochlorothiazide medication.   ,

## 2025-06-03 ENCOUNTER — EMERGENCY (EMERGENCY)
Facility: HOSPITAL | Age: 85
LOS: 0 days | Discharge: HOME | End: 2025-06-03
Attending: EMERGENCY MEDICINE
Payer: MEDICARE

## 2025-06-03 VITALS
RESPIRATION RATE: 18 BRPM | HEART RATE: 62 BPM | SYSTOLIC BLOOD PRESSURE: 140 MMHG | OXYGEN SATURATION: 99 % | DIASTOLIC BLOOD PRESSURE: 50 MMHG

## 2025-06-03 VITALS
SYSTOLIC BLOOD PRESSURE: 154 MMHG | RESPIRATION RATE: 18 BRPM | OXYGEN SATURATION: 97 % | HEIGHT: 64 IN | HEART RATE: 65 BPM | WEIGHT: 119.93 LBS | DIASTOLIC BLOOD PRESSURE: 81 MMHG | TEMPERATURE: 98 F

## 2025-06-03 DIAGNOSIS — E78.5 HYPERLIPIDEMIA, UNSPECIFIED: ICD-10-CM

## 2025-06-03 DIAGNOSIS — I10 ESSENTIAL (PRIMARY) HYPERTENSION: ICD-10-CM

## 2025-06-03 DIAGNOSIS — Z91.048 OTHER NONMEDICINAL SUBSTANCE ALLERGY STATUS: ICD-10-CM

## 2025-06-03 DIAGNOSIS — H54.8 LEGAL BLINDNESS, AS DEFINED IN USA: ICD-10-CM

## 2025-06-03 DIAGNOSIS — S22.9XXA: ICD-10-CM

## 2025-06-03 DIAGNOSIS — Z91.040 LATEX ALLERGY STATUS: ICD-10-CM
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
## 2025-06-03 PROCEDURE — 74177 CT ABD & PELVIS W/CONTRAST: CPT | Mod: 26

## 2025-06-03 PROCEDURE — 70450 CT HEAD/BRAIN W/O DYE: CPT

## 2025-06-03 PROCEDURE — 99222 1ST HOSP IP/OBS MODERATE 55: CPT

## 2025-06-03 PROCEDURE — 99284 EMERGENCY DEPT VISIT MOD MDM: CPT | Mod: 25

## 2025-06-03 PROCEDURE — 99285 EMERGENCY DEPT VISIT HI MDM: CPT

## 2025-06-03 PROCEDURE — 71260 CT THORAX DX C+: CPT | Mod: 26

## 2025-06-03 PROCEDURE — 71045 X-RAY EXAM CHEST 1 VIEW: CPT

## 2025-06-03 PROCEDURE — 70450 CT HEAD/BRAIN W/O DYE: CPT | Mod: 26

## 2025-06-03 PROCEDURE — 72170 X-RAY EXAM OF PELVIS: CPT | Mod: 26

## 2025-06-03 PROCEDURE — 71045 X-RAY EXAM CHEST 1 VIEW: CPT | Mod: 26

## 2025-06-03 PROCEDURE — 74177 CT ABD & PELVIS W/CONTRAST: CPT

## 2025-06-03 PROCEDURE — 72125 CT NECK SPINE W/O DYE: CPT

## 2025-06-03 PROCEDURE — 71260 CT THORAX DX C+: CPT

## 2025-06-03 PROCEDURE — 72125 CT NECK SPINE W/O DYE: CPT | Mod: 26

## 2025-06-03 PROCEDURE — 72170 X-RAY EXAM OF PELVIS: CPT

## 2025-06-03 RX ORDER — ACETAMINOPHEN 500 MG/5ML
650 LIQUID (ML) ORAL ONCE
Refills: 0 | Status: DISCONTINUED | OUTPATIENT
Start: 2025-06-03 | End: 2025-06-03

## 2025-06-03 NOTE — ED ADULT TRIAGE NOTE - BSA (M2)
Nursery  Record    Subjective:     1075 Carlos Alonzo is a male infant born on 2024 at 2:36 AM . He weighed 3.245 kg and measured 21\" in length. Apgars were 8 and 9. Maternal Data:     Delivery Type: Vaginal, Spontaneous   Delivery Resuscitation: Routine  Number of Vessels:  3V  Cord Events: none  Meconium Stained: no  ROM: 7hr    Information for the patient's mother:  Angelia Spavista [[id number]]   25 y.o. Information for the patient's mother:  Angelia Spavista [[id number]]   [address] [id number]       Information for the patient's mother:  AngeliaVlingo [[id number]]     Patient Active Problem List    Diagnosis Date Noted    Pregnancy 2022    38 weeks gestation of pregnancy 2022    Normal labor     Anemia complicating pregnancy in third trimester 2022    IUP (intrauterine pregnancy), incidental 2022    25 weeks gestation of pregnancy 2021    Variable fetal heart rate decelerations, antepartum 2021    Nicotine dependence 2021    Urinary tract infection without hematuria 10/10/2021    Vaginal bleeding in pregnancy 10/10/2021    History of kidney stones     [facility]     Head injury 2020    Concussion wth loss of consciousness of 30 minutes or less 2020        Information for the patient's mother:  Angelia Casey [[id number]]   Gestational Age: 38w1d   Prenatal Labs:  Lab Results   Component Value Date/Time    ABO/Rh(D) A POSITIVE 2022 01:45 PM          Per prenatals: 21: HIV neg, Hep BsAg neg, RPR NR, RI, GC/Chalm neg, 22: GBS neg    Pregnancy complications: Anemia, Kidney Stones, Vit D def, Iron def. Medications during pregnancy: PNV     complications: none. Maternal antibiotics: none    Apgars:  Apgar @ 1minute: 8        Apgar @ 5 minutes: 9        Apgar @ 10 minutes:     Comments: Routine care provided by nursing.       Feeding Method: Bottle      Objective:     Visit Vitals  Pulse 125   Temp 98.2 °F (36.8 °C) (Axillary)   Resp 37   Ht 53.3 cm   Wt 3.126 kg   HC 34 cm   BMI 10.99 kg/m²       Results for orders placed or performed during the hospital encounter of 02/18/22   BILIRUBIN, FRACTIONATED   Result Value Ref Range    Bilirubin, total 5.4 2.0 - 6.0 MG/DL    Bilirubin, direct 0.2 0.0 - 0.2 MG/DL    Bilirubin, indirect 5.2 MG/DL   ALBUMIN   Result Value Ref Range    Albumin 3.3 (L) 3.4 - 5.0 g/dL   BILIRUBIN, TXCUTANEOUS POC   Result Value Ref Range    TcBili <24 hrs. 5.8 0 - 9 mg/dL    TcBili 24-48 hrs. (A)     TcBili >48 hrs. BILIRUBIN, TXCUTANEOUS POC   Result Value Ref Range    TcBili <24 hrs. TcBili 24-48 hrs. 5.4 (A) 9 - 14 mg/dL    TcBili >48 hrs. BILIRUBIN, TXCUTANEOUS POC   Result Value Ref Range    TcBili <24 hrs. TcBili 24-48 hrs. 6.3 (A) 9 - 14 mg/dL    TcBili >48 hrs. Recent Results (from the past 24 hour(s))   BILIRUBIN, TXCUTANEOUS POC    Collection Time: 02/18/22 11:55 PM   Result Value Ref Range    TcBili <24 hrs. 5.8 0 - 9 mg/dL    TcBili 24-48 hrs. (A)     TcBili >48 hrs. BILIRUBIN, TXCUTANEOUS POC    Collection Time: 02/19/22  3:00 AM   Result Value Ref Range    TcBili <24 hrs. TcBili 24-48 hrs. 5.4 (A) 9 - 14 mg/dL    TcBili >48 hrs. BILIRUBIN, TXCUTANEOUS POC    Collection Time: 02/19/22  6:03 AM   Result Value Ref Range    TcBili <24 hrs. TcBili 24-48 hrs. 6.3 (A) 9 - 14 mg/dL    TcBili >48 hrs.      BILIRUBIN, FRACTIONATED    Collection Time: 02/19/22  6:51 AM   Result Value Ref Range    Bilirubin, total 5.4 2.0 - 6.0 MG/DL    Bilirubin, direct 0.2 0.0 - 0.2 MG/DL    Bilirubin, indirect 5.2 MG/DL   ALBUMIN    Collection Time: 02/19/22  6:51 AM   Result Value Ref Range    Albumin 3.3 (L) 3.4 - 5.0 g/dL       Physical Exam:    Code for table:  O No abnormality  X Abnormally (describe abnormal findings) Admission Exam  CODE Admission Exam  Description of  Findings Discharge 1.57 Exam  CODE Discharge Exam  Description of  Findings   General Appearance O AGA infant, NAD O    Skin O Acrocyanosis, no lesions or bruising O Mild jaundice   Head, Neck O AF flat open, no masses or sinuses O    Eyes O +RR OU O    Ears, Nose, & Throat O Nares patent, no clefts O    Thorax O Symmetric O    Lungs O BBS clear & equal O    Heart O No murmur, CRT <2 sec, +femoral pulses O    Abdomen O 3VC, +BS, soft, non-distended, no masses O    Genitalia O Male, testes down O +circ   Anus O patent O    Trunk and Spine O Straight & intact O    Extremities O FROM x 4, no deformity, no hip clunks, no clavicular crepitus O    Neuro/Reflexes O Good tone, + suck, grasp, & sym nuno O    Examiner  KT Riley, DO KT Riley, DO       Medications Administered     erythromycin (ILOTYCIN) 5 mg/gram (0.5 %) ophthalmic ointment     Admin Date  2022 Action  Given Dose   Route  Both Eyes Administered By  Lashonda KO          hepatitis B virus vaccine (PF) (ENGERIX) DHEC syringe 10 mcg     Admin Date  2022 Action  Given Dose  10 mcg Route  IntraMUSCular Administered By  Lashonda KO          phytonadione (vitamin K1) (AQUA-MEPHYTON) injection 1 mg     Admin Date  2022 Action  Given Dose  1 mg Route  IntraMUSCular Administered By  Lashonda KO                  Immunization History   Administered Date(s) Administered    Hep B, Adol/Ped 2022       Hearing Screen:  Hearing Screen: Yes (22)  Left Ear: Pass (22)  Right Ear: Pass ( 8368)    Metabolic Screen:  Initial  Screen Completed: Yes (22 034)    CHD Oxygen Saturation Screening:  Pre Ductal O2 Sat (%): 98  Post Ductal O2 Sat (%): 99    Assessment/Plan:     Active Problems:    Liveborn infant by vaginal delivery (2022)         Impression on admission: Admission day,  Healthy appearing 38.1 week AGA male delivered by  to a 18yr  mom with uneventful pregnancy, apgars 8/9, transitioning well. Mom GBS neg. ROM  7hrs. VSS-AF, exam above. Mom plans to bottle feed. Regular nursery care. Anticipated 1-2 day stay. Liana Royal DO. Impression on Discharge: 22, 0830. DOL 1, term AGA male born via , did well overnight. Infant responds to stimulation with activity and tone appropriate for gestational age. VS continue to be stable. Has been feeding well via bottle. Total weight change -4% . Infant voiding and stooling appropriately. Bilirubin screen acceptable with Bili 5.4 @ 28hr in LRZ with LL 12.3. Hearing/CCHD/ Metabolic screens completed. Stable for discharge today. Will follow up with [facility] , 9am.  Bayron Nam DO        Discharge weight:    Wt Readings from Last 1 Encounters:   22 3.126 kg (32 %, Z= -0.46)*     * Growth percentiles are based on WHO (Boys, 0-2 years) data.

## 2025-06-03 NOTE — ED PROVIDER NOTE - NSFOLLOWUPINSTRUCTIONS_ED_ALL_ED_FT
Fall Prevention in the Home    Falls can cause injuries and can affect people from all age groups. There are many simple things that you can do to make your home safe and to help prevent falls.    WHAT CAN I DO ON THE OUTSIDE OF MY HOME?  Regularly repair the edges of walkways and driveways and fix any cracks.  Remove high doorway thresholds.  Trim any shrubbery on the main path into your home.  Use bright outdoor lighting.  Clear walkways of debris and clutter, including tools and rocks.  Regularly check that handrails are securely fastened and in good repair. Both sides of any steps should have handrails.  Install guardrails along the edges of any raised decks or porches.  Have leaves, snow, and ice cleared regularly.  Use sand or salt on walkways during winter months.  In the garage, clean up any spills right away, including grease or oil spills.    WHAT CAN I DO IN THE BATHROOM?  Use night lights.  Install grab bars by the toilet and in the tub and shower. Do not use towel bars as grab bars.  Use non-skid mats or decals on the floor of the tub or shower.  If you need to sit down while you are in the shower, use a plastic, non-slip stool.  Keep the floor dry. Immediately clean up any water that spills on the floor.  Remove soap buildup in the tub or shower on a regular basis.  Attach bath mats securely with double-sided non-slip rug tape.  Remove throw rugs and other tripping hazards from the floor.    WHAT CAN I DO IN THE BEDROOM?  Use night lights.  Make sure that a bedside light is easy to reach.  Do not use oversized bedding that drapes onto the floor.  Have a firm chair that has side arms to use for getting dressed.  Remove throw rugs and other tripping hazards from the floor.    WHAT CAN I DO IN THE KITCHEN?  Clean up any spills right away.  Avoid walking on wet floors.  Place frequently used items in easy-to-reach places.  If you need to reach for something above you, use a sturdy step stool that has a grab bar.  Keep electrical cables out of the way.  Do not use floor polish or wax that makes floors slippery. If you have to use wax, make sure that it is non-skid floor wax.  Remove throw rugs and other tripping hazards from the floor.    WHAT CAN I DO IN THE STAIRWAYS?  Do not leave any items on the stairs.  Make sure that there are handrails on both sides of the stairs. Fix handrails that are broken or loose. Make sure that handrails are as long as the stairways.  Check any carpeting to make sure that it is firmly attached to the stairs. Fix any carpet that is loose or worn.  Avoid having throw rugs at the top or bottom of stairways, or secure the rugs with carpet tape to prevent them from moving.  Make sure that you have a light switch at the top of the stairs and the bottom of the stairs. If you do not have them, have them installed.    WHAT ARE SOME OTHER FALL PREVENTION TIPS?  Wear closed-toe shoes that fit well and support your feet. Wear shoes that have rubber soles or low heels.  When you use a stepladder, make sure that it is completely opened and that the sides are firmly locked. Have someone hold the ladder while you are using it. Do not climb a closed stepladder.  Add color or contrast paint or tape to grab bars and handrails in your home. Place contrasting color strips on the first and last steps.  Use mobility aids as needed, such as canes, walkers, scooters, and crutches.  Turn on lights if it is dark. Replace any light bulbs that burn out.  Set up furniture so that there are clear paths. Keep the furniture in the same spot.  Fix any uneven floor surfaces.  Choose a carpet design that does not hide the edge of steps of a stairway.  Be aware of any and all pets.  Review your medicines with your healthcare provider. Some medicines can cause dizziness or changes in blood pressure, which increase your risk of falling.     Talk with your health care provider about other ways that you can decrease your risk of falls. This may include working with a physical therapist or  to improve your strength, balance, and endurance.    ADDITIONAL NOTES AND INSTRUCTIONS    Please follow up with your Primary MD in 24-48 hr.  Seek immediate medical care for any new/worsening signs or symptoms. Get an abdominal binder for belly pain    Fall Prevention in the Home    Falls can cause injuries and can affect people from all age groups. There are many simple things that you can do to make your home safe and to help prevent falls.    WHAT CAN I DO ON THE OUTSIDE OF MY HOME?  Regularly repair the edges of walkways and driveways and fix any cracks.  Remove high doorway thresholds.  Trim any shrubbery on the main path into your home.  Use bright outdoor lighting.  Clear walkways of debris and clutter, including tools and rocks.  Regularly check that handrails are securely fastened and in good repair. Both sides of any steps should have handrails.  Install guardrails along the edges of any raised decks or porches.  Have leaves, snow, and ice cleared regularly.  Use sand or salt on walkways during winter months.  In the garage, clean up any spills right away, including grease or oil spills.    WHAT CAN I DO IN THE BATHROOM?  Use night lights.  Install grab bars by the toilet and in the tub and shower. Do not use towel bars as grab bars.  Use non-skid mats or decals on the floor of the tub or shower.  If you need to sit down while you are in the shower, use a plastic, non-slip stool.  Keep the floor dry. Immediately clean up any water that spills on the floor.  Remove soap buildup in the tub or shower on a regular basis.  Attach bath mats securely with double-sided non-slip rug tape.  Remove throw rugs and other tripping hazards from the floor.    WHAT CAN I DO IN THE BEDROOM?  Use night lights.  Make sure that a bedside light is easy to reach.  Do not use oversized bedding that drapes onto the floor.  Have a firm chair that has side arms to use for getting dressed.  Remove throw rugs and other tripping hazards from the floor.    WHAT CAN I DO IN THE KITCHEN?  Clean up any spills right away.  Avoid walking on wet floors.  Place frequently used items in easy-to-reach places.  If you need to reach for something above you, use a sturdy step stool that has a grab bar.  Keep electrical cables out of the way.  Do not use floor polish or wax that makes floors slippery. If you have to use wax, make sure that it is non-skid floor wax.  Remove throw rugs and other tripping hazards from the floor.    WHAT CAN I DO IN THE STAIRWAYS?  Do not leave any items on the stairs.  Make sure that there are handrails on both sides of the stairs. Fix handrails that are broken or loose. Make sure that handrails are as long as the stairways.  Check any carpeting to make sure that it is firmly attached to the stairs. Fix any carpet that is loose or worn.  Avoid having throw rugs at the top or bottom of stairways, or secure the rugs with carpet tape to prevent them from moving.  Make sure that you have a light switch at the top of the stairs and the bottom of the stairs. If you do not have them, have them installed.    WHAT ARE SOME OTHER FALL PREVENTION TIPS?  Wear closed-toe shoes that fit well and support your feet. Wear shoes that have rubber soles or low heels.  When you use a stepladder, make sure that it is completely opened and that the sides are firmly locked. Have someone hold the ladder while you are using it. Do not climb a closed stepladder.  Add color or contrast paint or tape to grab bars and handrails in your home. Place contrasting color strips on the first and last steps.  Use mobility aids as needed, such as canes, walkers, scooters, and crutches.  Turn on lights if it is dark. Replace any light bulbs that burn out.  Set up furniture so that there are clear paths. Keep the furniture in the same spot.  Fix any uneven floor surfaces.  Choose a carpet design that does not hide the edge of steps of a stairway.  Be aware of any and all pets.  Review your medicines with your healthcare provider. Some medicines can cause dizziness or changes in blood pressure, which increase your risk of falling.     Talk with your health care provider about other ways that you can decrease your risk of falls. This may include working with a physical therapist or  to improve your strength, balance, and endurance.    ADDITIONAL NOTES AND INSTRUCTIONS    Please follow up with your Primary MD in 24-48 hr.  Seek immediate medical care for any new/worsening signs or symptoms.

## 2025-06-03 NOTE — ED PROVIDER NOTE - SECONDARY DIAGNOSIS.
Pt called stating she had a procedure done today and was sent home with prescription for Cipro. Pt reports she had a possible allergy to cipro in the past when she had an episode of visual changes. Pt reports she already took one of the pills before she realized, pt denies any reaction at this time. Reached out to Dr. Arzola who did pts procedure, he stated he thinks it is safe for pt to continue the cipro and to call back if she has any reactions. Pt verbalized understanding.   Reason for Disposition   [1] Caller has URGENT medicine question about med that PCP or specialist prescribed AND [2] triager unable to answer question    Additional Information   Negative: [1] Intentional drug overdose AND [2] suicidal thoughts or ideas   Negative: MORE THAN A DOUBLE DOSE of a prescription or over-the-counter (OTC) drug   Negative: [1] DOUBLE DOSE (an extra dose or lesser amount) of prescription drug AND [2] any symptoms (e.g., dizziness, nausea, pain, sleepiness)   Negative: [1] DOUBLE DOSE (an extra dose or lesser amount) of over-the-counter (OTC) drug AND [2] any symptoms (e.g., dizziness, nausea, pain, sleepiness)   Negative: Took another person's prescription drug   Negative: [1] DOUBLE DOSE (an extra dose or lesser amount) of prescription drug AND [2] NO symptoms  (Exception: A double dose of antibiotics.)   Negative: Diabetes drug error or overdose (e.g., took wrong type of insulin or took extra dose)   Negative: [1] Prescription not at pharmacy AND [2] was prescribed by PCP recently (Exception: Triager has access to EMR and prescription is recorded there. Go to Home Care and confirm for pharmacy.)   Negative: [1] Pharmacy calling with prescription question AND [2] triager unable to answer question    Protocols used: Medication Question Call-A-     Back pain

## 2025-06-03 NOTE — CONSULT NOTE ADULT - SUBJECTIVE AND OBJECTIVE BOX
84-year-old female past medical history of hypertension, hyperlipidemia, blindness[facility] post mechanical fall.  Patient states she had to use the bathroom and was calling for help but no one was able to attend to her.  Patient states she attempted to get up on her own and sit on a chair nearby however was unable to reach the chair, resulting in her falling backwards landing on her buttocks.  Reports normally ambulating with walker.  Patient states she was lying flat for approximately a few minutes when nearby staff found her and called 911.  Patient denies any head trauma or LOC.  Patient has no current complaints.      Vital Signs Last 24 Hrs  T(C): 36.8 (03 Jun 2025 10:47), Max: 36.8 (03 Jun 2025 10:47)  T(F): 98.3 (03 Jun 2025 10:47), Max: 98.3 (03 Jun 2025 10:47)  HR: 65 (03 Jun 2025 10:47) (65 - 65)  BP: 154/81 (03 Jun 2025 10:47) (154/81 - 154/81)  BP(mean): --  RR: 18 (03 Jun 2025 10:47) (18 - 18)  SpO2: 97% (03 Jun 2025 10:47) (97% - 97%)    Parameters below as of 03 Jun 2025 10:47  Patient On (Oxygen Delivery Method): room air        MEDICATIONS  (STANDING):  acetaminophen     Tablet .. 650 milliGRAM(s) Oral once      Exam:        awake, alert and follows simple commands                   no tenderness on palpation to cervical, thoracic and lumbar region                   moves upper extremities with 5/5 strength                    moves lower extremities with 5/5 strength                    sensation intact and symmetrical         CT abdomen:  Mild T6 compression fracture is age indeterminate. Please correlate with   point tenderness.

## 2025-06-03 NOTE — CONSULT NOTE ADULT - ASSESSMENT
83 yo female s/p fall with mild back pain and denied leg pain, no B/B dysfunction  CT found mild T6 compression fracture  pt neurological grossly intact  no neurosurgical intervention indicated  if pain persist, can wear abdomen binder for comfort

## 2025-06-03 NOTE — ED PROVIDER NOTE - PHYSICAL EXAMINATION
Physical Exam    Vital Signs: I have reviewed the initial vital signs.  Constitutional: appears stated age, no acute distress, cooperative  Head: NC/AT  OP: MMM  Neck: Supple, non-tender.   Cardiovascular: Regular rate, regular rhythm, well-perfused extremities, distal pulses 2+ and equal b/l  Respiratory: unlabored respiratory effort, clear to auscultation bilaterally  Gastrointestinal: soft, non-tender, non-distended abdomen  Musculoskeletal: Actively moving all extremities. UE and LE strength 5+ b/l. No midline tenderness.   Skin: Warm, dry  Neurologic: awake, alert, oriented x3. Speaking in full, clear sentences.   Psychiatric: appropriate mood, appropriate affect

## 2025-06-03 NOTE — ED ADULT TRIAGE NOTE - CHIEF COMPLAINT QUOTE
S/p mechanical fall while trying to sit on the toilet. C/o back pain radiating to abdomen. Not on AC GCS 15.

## 2025-06-03 NOTE — ED ADULT NURSE NOTE - OBJECTIVE STATEMENT
Pt GLF out of bed when trying to go to the bathroom, Pt missed chair to get into and fell onto back. PT reports no HT by c/o of headache and abdomen pain. no AC. Pt blind

## 2025-06-03 NOTE — ED ADULT TRIAGE NOTE - IDEAL BODY WEIGHT(KG)
Left a message for patient to return call re: ED Follow-up for 14 day follow-up  Patient has contact information  Episode to be resolved
55

## 2025-06-03 NOTE — CONSULT NOTE ADULT - ATTENDING COMMENTS
ACS Attending  Note Attestation    Patient is examined and evaluated at the bedside with the residents/PAs. Treatment plan discussed with the team, nurses, and consulting physicians and consulting teams. Medications, radiological studies and all other relevant studies reviewed. Time devoted to teaching and to any procedures I billed separately is not included.    Cristin Gonzalez is a 84 year old female with PMH of HTN, HLD, legally blind seen as trauma consult s/p mechanical fall (-HT, -LOC, -AC). Patient resides at the [facility] and reports attempting to sit in her walker seat, then missing the seat, falling backwards onto her bottom. Patient repots that the usually uses a wheelchair but is learning to how to walk with a walker.    Primary Survey:    A - airway intact  B - bilateral breath sounds and equal chest chest rise  C - palpable pulses in all extremities  D - GCS 15 on arrival, BOWDEN  Exposure obtained    Vital Signs Last 24 Hrs  T(C): 36.8 (03 Jun 2025 10:47), Max: 36.8 (03 Jun 2025 10:47)  T(F): 98.3 (03 Jun 2025 10:47), Max: 98.3 (03 Jun 2025 10:47)  HR: 65 (03 Jun 2025 10:47) (65 - 65)  BP: 154/81 (03 Jun 2025 10:47) (154/81 - 154/81)  BP(mean): --  RR: 18 (03 Jun 2025 10:47) (18 - 18)  SpO2: 97% (03 Jun 2025 10:47) (97% - 97%)    Parameters below as of 03 Jun 2025 10:47  Patient On (Oxygen Delivery Method): room air    Secondary Survey:   General: NAD  HEENT: Normocephalic, atraumatic, EOMI, PEERLA. no scalp lacerations   Neck: Soft, midline trachea. no c-spine tenderness  Chest: No chest wall tenderness, no subcutaneous emphysema   Cardiac: S1, S2, RRR  Respiratory: Bilateral breath sounds, clear and equal bilaterally  Abdomen: Soft, non-distended, non-tender, no rebound, no guarding.  Groin: Normal appearing, pelvis stable   Ext:  Moving b/l upper and lower extremities. Palpable Radial b/l UE, b/l DP palpable in LE.   Back: No T/L/S spine tenderness, No palpable runoff/stepoff/deformity  Rectal: No srinivsaa blood, RIGO with good tone    RADIOLOGY & ADDITIONAL STUDIES:  < from: CT Head No Cont (06.03.25 @ 13:18) >  CT HEAD:  No acute intracranial pathology or hemorrhage. No acutecalvarial   fracture.  Moderate chronic microvascular type changes.  CT CERVICAL SPINE:  No acute fracture or subluxation.    < from: CT Chest w/ IV Cont (06.03.25 @ 13:22) >  IMPRESSION:  Mild T6 compression fracture is age indeterminate. Please correlate with   point tenderness.  Otherwise, no evidence of acute thoracic,abdominal or pelvic pathology  Pulmonary nodules most of which are chronic. Nevertheless, follow-up in   12 months may be of benefit.    Injuries Identified   - Age indeterminate T6 compression fx    PLAN:   - NSx: No intervention, abdominal binder for comfort   - Pain control   - Otherwise no evidence of clinically significant acute traumatic injury   - No trauma surgery intervention   - If admitted, will follow for tertiary survey    [x ]  55 minutes spent on total encounter. The necessity of the time above spent during the encounter on this date of service as described above.  [id number]  Yaron Hale MD  Trauma/ACS/Surgical Critical Care Attending

## 2025-06-03 NOTE — ED ADULT NURSE NOTE - NSFALLHARMRISKINTERV_ED_ALL_ED
Assistance OOB with selected safe patient handling equipment if applicable/Assistance with ambulation/Communicate risk of Fall with Harm to all staff, patient, and family/Provide patient with walking aids/Provide visual cue: red socks, yellow wristband, yellow gown, etc/Reinforce activity limits and safety measures with patient and family/Use of alarms - bed, stretcher, chair and/or video monitoring/Bed in lowest position, wheels locked, appropriate side rails in place/Call bell, personal items and telephone in reach/Instruct patient to call for assistance before getting out of bed/chair/stretcher/Non-slip footwear applied when patient is off stretcher/[place] to call system/Physically safe environment - no spills, clutter or unnecessary equipment/Purposeful Proactive Rounding/Room/bathroom lighting operational, light cord in reach

## 2025-06-03 NOTE — ED PROVIDER NOTE - PATIENT PORTAL LINK FT
You can access the FollowMyHealth Patient Portal offered by [facility] by registering at the following website: [email or web address]. By joining [employer] [facility]’s FollowMyHealth portal, you will also be able to view your health information using other applications (apps) compatible with our system.

## 2025-06-03 NOTE — ED PROVIDER NOTE - ATTENDING CONTRIBUTION TO CARE
84-year-old female past medical history significant for hypertension, dyslipidemia, legally blind, sent to the ED from assisted living status post fall.  As per patient, this morning, she went to sit in her walker seat and missed the seat and fell to the ground.  Patient reports she fell onto her back.  Patient complaining of back pain and abdominal pain.  Patient is on a baby aspirin daily.  Patient is not on any anticoagulation.  No head trauma or LOC.  No neck pain.  No paresthesias or motor weakness.  No chest pain or shortness of breath.  Patient was not ambulatory after the fall.  Patient reports she is currently wheelchair-bound and is "learning to walk with a walker."  Vitals noted.  ALERT OX3 NAD GCS-15. NCAT. + BLIND. NO MIDLINE C SPINE TENDERNESS. LUNGS CLEAR B/L. CHEST NONTENDER, NO CREPITUS. RRR. ABD- SOFT NONTENDER. PELVIS STABLE NONTENDER. BACK WITH TENDERNESS OVER THORACIC AND LUMBAR SPINE. NEURO EXAM NONFOCAL. .

## 2025-06-03 NOTE — ED PROVIDER NOTE - CLINICAL SUMMARY MEDICAL DECISION MAKING FREE TEXT BOX
84-year-old female past medical history as documented brought to the ED status post fall at an assisted living facility.  Patient complaining of back pain.  All labs reviewed.  X-rays with no acute traumatic injuries.  CT scans with T6 compression fracture of indeterminate age.  Trauma and neurosurgery consulted and recommendations appreciated.  Patient is currently not ambulatory.  Patient with no additional complaints.  Patient discharged to the assisted living facility.

## 2025-06-03 NOTE — ED PROVIDER NOTE - OBJECTIVE STATEMENT
84-year-old female past medical history of hypertension, hyperlipidemia, blindness, from [facility] sent in for evaluation status post mechanical fall.  Patient states she had to use the bathroom and was calling for help but no one was able to attend to her.  Patient states she attempted to get up on her own and sit on a chair nearby however was unable to reach the chair, resulting in her falling backwards landing on her buttocks.  Reports normally ambulating with walker.  Patient states she was lying flat for approximately a few minutes when nearby staff found her and called 911.  Patient denies any head trauma or LOC.  Patient has no current complaints.